# Patient Record
Sex: FEMALE | Race: BLACK OR AFRICAN AMERICAN | NOT HISPANIC OR LATINO
[De-identification: names, ages, dates, MRNs, and addresses within clinical notes are randomized per-mention and may not be internally consistent; named-entity substitution may affect disease eponyms.]

---

## 2017-02-27 ENCOUNTER — APPOINTMENT (OUTPATIENT)
Dept: INTERNAL MEDICINE | Facility: CLINIC | Age: 52
End: 2017-02-27

## 2017-03-01 ENCOUNTER — APPOINTMENT (OUTPATIENT)
Dept: NUTRITION | Facility: CLINIC | Age: 52
End: 2017-03-01

## 2017-03-23 ENCOUNTER — APPOINTMENT (OUTPATIENT)
Dept: INTERNAL MEDICINE | Facility: CLINIC | Age: 52
End: 2017-03-23

## 2017-03-23 VITALS
BODY MASS INDEX: 40.82 KG/M2 | HEART RATE: 97 BPM | WEIGHT: 254 LBS | DIASTOLIC BLOOD PRESSURE: 80 MMHG | HEIGHT: 66 IN | SYSTOLIC BLOOD PRESSURE: 116 MMHG

## 2017-03-23 DIAGNOSIS — M25.531 PAIN IN RIGHT WRIST: ICD-10-CM

## 2017-03-23 DIAGNOSIS — S52.282S: ICD-10-CM

## 2017-03-23 DIAGNOSIS — L03.119 CELLULITIS OF UNSPECIFIED PART OF LIMB: ICD-10-CM

## 2017-03-23 DIAGNOSIS — S52.209A UNSPECIFIED FRACTURE OF SHAFT OF UNSPECIFIED ULNA, INITIAL ENCOUNTER FOR CLOSED FRACTURE: ICD-10-CM

## 2017-03-27 LAB
HEMOCCULT STL QL IA: NEGATIVE
TSH SERPL DL<=0.005 MIU/L-ACNC: 1.11 UIU/ML

## 2017-03-27 RX ORDER — CLOBETASOL PROPIONATE 0.5 MG/G
0.05 CREAM TOPICAL TWICE DAILY
Qty: 1 | Refills: 1 | Status: DISCONTINUED | COMMUNITY
Start: 2017-03-23 | End: 2017-03-27

## 2017-05-08 ENCOUNTER — APPOINTMENT (OUTPATIENT)
Dept: INTERNAL MEDICINE | Facility: CLINIC | Age: 52
End: 2017-05-08

## 2017-05-08 VITALS
WEIGHT: 256 LBS | DIASTOLIC BLOOD PRESSURE: 82 MMHG | BODY MASS INDEX: 41.14 KG/M2 | HEART RATE: 98 BPM | SYSTOLIC BLOOD PRESSURE: 136 MMHG | HEIGHT: 66 IN

## 2017-05-08 DIAGNOSIS — Z86.39 PERSONAL HISTORY OF OTHER ENDOCRINE, NUTRITIONAL AND METABOLIC DISEASE: ICD-10-CM

## 2017-05-09 LAB
ALBUMIN SERPL-MCNC: 3.6 G/DL
ALBUMIN/GLOB SERPL: 1.16
ALP SERPL-CCNC: 91 IU/L
ALT SERPL-CCNC: 19 IU/L
ANION GAP SERPL CALC-SCNC: 11 MEQ/L
AST SERPL-CCNC: 21 IU/L
BASOPHILS # BLD: 0.02 TH/MM3
BASOPHILS NFR BLD: 0.3 %
BILIRUB SERPL-MCNC: 0.7 MG/DL
BUN SERPL-MCNC: 20 MG/DL
BUN/CREAT SERPL: 24.1 %
CALCIUM SERPL-MCNC: 9.7 MG/DL
CHLORIDE SERPL-SCNC: 105 MEQ/L
CO2 SERPL-SCNC: 27 MEQ/L
CREAT SERPL-MCNC: 0.83 MG/DL
DIFFERENTIAL METHOD BLD: NORMAL
EOSINOPHIL # BLD: 0.14 TH/MM3
EOSINOPHIL NFR BLD: 1.8 %
ERYTHROCYTE [DISTWIDTH] IN BLOOD BY AUTOMATED COUNT: 14.7 %
GFR SERPL CREATININE-BSD FRML MDRD: 87
GLUCOSE SERPL-MCNC: 74 MG/DL
GRANULOCYTES # BLD: 3.88 TH/MM3
GRANULOCYTES NFR BLD: 49.3 %
HCT VFR BLD AUTO: 43.1 %
HGB BLD-MCNC: 13.7 G/DL
IMM GRANULOCYTES # BLD: 0.02 TH/MM3
IMM GRANULOCYTES NFR BLD: 0.3 %
LYMPHOCYTES # BLD: 3.11 TH/MM3
LYMPHOCYTES NFR BLD: 39.6 %
MCH RBC QN AUTO: 30.1 PG
MCHC RBC AUTO-ENTMCNC: 31.8 G/DL
MCV RBC AUTO: 94.7 FL
MONOCYTES # BLD: 0.68 TH/MM3
MONOCYTES NFR BLD: 8.7 %
PLATELET # BLD: 262 TH/MM3
PMV BLD AUTO: 9.9 FL
POTASSIUM SERPL-SCNC: 4.2 MMOL/L
PROT SERPL-MCNC: 6.7 G/DL
RBC # BLD AUTO: 4.55 MIL/MM3
SODIUM SERPL-SCNC: 143 MEQ/L
WBC # BLD: 7.85 TH/MM3

## 2017-05-10 LAB
GAMMA INTERFERON BACKGROUND BLD IA-ACNC: 0.05 IU/ML
M TB IFN-G BLD-IMP: NEGATIVE
M TB IFN-G CD4+ T-CELLS BLD-ACNC: -0.01 IU/ML
MITOGEN IGNF BLD-ACNC: >10 IU/ML

## 2017-05-18 ENCOUNTER — APPOINTMENT (OUTPATIENT)
Dept: INTERNAL MEDICINE | Facility: CLINIC | Age: 52
End: 2017-05-18

## 2017-05-18 ENCOUNTER — OUTPATIENT (OUTPATIENT)
Dept: OUTPATIENT SERVICES | Facility: HOSPITAL | Age: 52
LOS: 1 days | Discharge: HOME | End: 2017-05-18

## 2017-05-18 VITALS
SYSTOLIC BLOOD PRESSURE: 122 MMHG | HEART RATE: 93 BPM | BODY MASS INDEX: 40.82 KG/M2 | HEIGHT: 66 IN | DIASTOLIC BLOOD PRESSURE: 85 MMHG | WEIGHT: 254 LBS

## 2017-06-28 DIAGNOSIS — M54.5 LOW BACK PAIN: ICD-10-CM

## 2017-06-28 DIAGNOSIS — F20.9 SCHIZOPHRENIA, UNSPECIFIED: ICD-10-CM

## 2017-06-28 DIAGNOSIS — E66.9 OBESITY, UNSPECIFIED: ICD-10-CM

## 2017-06-28 DIAGNOSIS — F31.9 BIPOLAR DISORDER, UNSPECIFIED: ICD-10-CM

## 2017-06-29 ENCOUNTER — APPOINTMENT (OUTPATIENT)
Dept: INTERNAL MEDICINE | Facility: CLINIC | Age: 52
End: 2017-06-29

## 2017-07-25 ENCOUNTER — APPOINTMENT (OUTPATIENT)
Dept: INTERNAL MEDICINE | Facility: CLINIC | Age: 52
End: 2017-07-25

## 2017-07-25 ENCOUNTER — OUTPATIENT (OUTPATIENT)
Dept: OUTPATIENT SERVICES | Facility: HOSPITAL | Age: 52
LOS: 1 days | Discharge: HOME | End: 2017-07-25

## 2017-07-25 VITALS
HEART RATE: 81 BPM | BODY MASS INDEX: 41.62 KG/M2 | WEIGHT: 259 LBS | SYSTOLIC BLOOD PRESSURE: 130 MMHG | DIASTOLIC BLOOD PRESSURE: 81 MMHG | HEIGHT: 66 IN

## 2017-07-25 DIAGNOSIS — Z79.899 OTHER LONG TERM (CURRENT) DRUG THERAPY: ICD-10-CM

## 2017-08-03 DIAGNOSIS — F31.9 BIPOLAR DISORDER, UNSPECIFIED: ICD-10-CM

## 2017-08-03 DIAGNOSIS — E66.01 MORBID (SEVERE) OBESITY DUE TO EXCESS CALORIES: ICD-10-CM

## 2017-08-29 ENCOUNTER — OUTPATIENT (OUTPATIENT)
Dept: OUTPATIENT SERVICES | Facility: HOSPITAL | Age: 52
LOS: 1 days | Discharge: HOME | End: 2017-08-29

## 2017-08-29 ENCOUNTER — APPOINTMENT (OUTPATIENT)
Dept: INTERNAL MEDICINE | Facility: CLINIC | Age: 52
End: 2017-08-29

## 2017-08-29 VITALS
BODY MASS INDEX: 40.5 KG/M2 | HEART RATE: 65 BPM | SYSTOLIC BLOOD PRESSURE: 122 MMHG | DIASTOLIC BLOOD PRESSURE: 86 MMHG | WEIGHT: 252 LBS | HEIGHT: 66 IN

## 2017-08-29 DIAGNOSIS — Z79.899 OTHER LONG TERM (CURRENT) DRUG THERAPY: ICD-10-CM

## 2017-08-29 LAB — CYTOLOGY CVX/VAG DOC THIN PREP: NEGATIVE

## 2017-08-30 DIAGNOSIS — E66.9 OBESITY, UNSPECIFIED: ICD-10-CM

## 2017-08-30 DIAGNOSIS — R56.9 UNSPECIFIED CONVULSIONS: ICD-10-CM

## 2017-08-30 DIAGNOSIS — L30.9 DERMATITIS, UNSPECIFIED: ICD-10-CM

## 2017-08-30 LAB
CHOLEST SERPL-MCNC: 224 MG/DL
ESTIMATED AVERGAGE GLUCOSE (NORTH): 123 MG/DL
HBA1C MFR BLD: 5.9 %
HDLC SERPL-MCNC: 65 MG/DL
HDLC SERPL: 3.45
LDLC SERPL DIRECT ASSAY-MCNC: 146 MG/DL
TRIGL SERPL-MCNC: 101 MG/DL
VALPROATE SERPL-MCNC: 45 UG/ML
VLDLC SERPL-MCNC: 20 MG/DL

## 2017-09-11 ENCOUNTER — OUTPATIENT (OUTPATIENT)
Dept: OUTPATIENT SERVICES | Facility: HOSPITAL | Age: 52
LOS: 1 days | Discharge: HOME | End: 2017-09-11

## 2017-09-11 DIAGNOSIS — Z12.31 ENCOUNTER FOR SCREENING MAMMOGRAM FOR MALIGNANT NEOPLASM OF BREAST: ICD-10-CM

## 2017-09-13 ENCOUNTER — APPOINTMENT (OUTPATIENT)
Dept: NUTRITION | Facility: CLINIC | Age: 52
End: 2017-09-13

## 2017-09-13 VITALS — BODY MASS INDEX: 41.48 KG/M2 | WEIGHT: 257 LBS

## 2017-09-27 ENCOUNTER — APPOINTMENT (OUTPATIENT)
Dept: OBGYN | Facility: CLINIC | Age: 52
End: 2017-09-27

## 2017-10-27 ENCOUNTER — APPOINTMENT (OUTPATIENT)
Dept: INTERNAL MEDICINE | Facility: CLINIC | Age: 52
End: 2017-10-27

## 2017-10-27 ENCOUNTER — OUTPATIENT (OUTPATIENT)
Dept: OUTPATIENT SERVICES | Facility: HOSPITAL | Age: 52
LOS: 1 days | Discharge: HOME | End: 2017-10-27

## 2017-10-27 VITALS
HEART RATE: 72 BPM | WEIGHT: 260 LBS | DIASTOLIC BLOOD PRESSURE: 90 MMHG | BODY MASS INDEX: 41.78 KG/M2 | HEIGHT: 66 IN | SYSTOLIC BLOOD PRESSURE: 145 MMHG

## 2017-10-27 RX ORDER — HALOBETASOL PROPIONATE 0.5 MG/G
0.05 CREAM TOPICAL TWICE DAILY
Qty: 1 | Refills: 2 | Status: DISCONTINUED | COMMUNITY
Start: 2017-03-27 | End: 2017-10-27

## 2017-11-24 ENCOUNTER — OUTPATIENT (OUTPATIENT)
Dept: OUTPATIENT SERVICES | Facility: HOSPITAL | Age: 52
LOS: 1 days | Discharge: HOME | End: 2017-11-24

## 2017-11-24 ENCOUNTER — APPOINTMENT (OUTPATIENT)
Dept: GASTROENTEROLOGY | Facility: CLINIC | Age: 52
End: 2017-11-24

## 2017-11-24 VITALS — BODY MASS INDEX: 41 KG/M2 | SYSTOLIC BLOOD PRESSURE: 132 MMHG | DIASTOLIC BLOOD PRESSURE: 78 MMHG | WEIGHT: 254 LBS

## 2017-11-29 ENCOUNTER — APPOINTMENT (OUTPATIENT)
Dept: OBGYN | Facility: CLINIC | Age: 52
End: 2017-11-29

## 2018-01-23 ENCOUNTER — APPOINTMENT (OUTPATIENT)
Dept: NEUROLOGY | Facility: CLINIC | Age: 53
End: 2018-01-23

## 2018-01-23 ENCOUNTER — OUTPATIENT (OUTPATIENT)
Dept: OUTPATIENT SERVICES | Facility: HOSPITAL | Age: 53
LOS: 1 days | Discharge: HOME | End: 2018-01-23

## 2018-01-23 VITALS
HEART RATE: 72 BPM | BODY MASS INDEX: 41.78 KG/M2 | WEIGHT: 260 LBS | DIASTOLIC BLOOD PRESSURE: 96 MMHG | HEIGHT: 66 IN | SYSTOLIC BLOOD PRESSURE: 154 MMHG

## 2018-02-09 ENCOUNTER — APPOINTMENT (OUTPATIENT)
Dept: INTERNAL MEDICINE | Facility: CLINIC | Age: 53
End: 2018-02-09

## 2018-03-12 ENCOUNTER — OUTPATIENT (OUTPATIENT)
Dept: OUTPATIENT SERVICES | Facility: HOSPITAL | Age: 53
LOS: 1 days | Discharge: HOME | End: 2018-03-12

## 2018-03-12 ENCOUNTER — APPOINTMENT (OUTPATIENT)
Dept: INTERNAL MEDICINE | Facility: CLINIC | Age: 53
End: 2018-03-12

## 2018-03-12 VITALS
HEART RATE: 74 BPM | SYSTOLIC BLOOD PRESSURE: 151 MMHG | BODY MASS INDEX: 41.46 KG/M2 | WEIGHT: 258 LBS | HEIGHT: 66 IN | DIASTOLIC BLOOD PRESSURE: 89 MMHG

## 2018-03-12 DIAGNOSIS — Z78.9 OTHER SPECIFIED HEALTH STATUS: ICD-10-CM

## 2018-03-12 DIAGNOSIS — F17.200 NICOTINE DEPENDENCE, UNSPECIFIED, UNCOMPLICATED: ICD-10-CM

## 2018-03-13 DIAGNOSIS — R55 SYNCOPE AND COLLAPSE: ICD-10-CM

## 2018-03-13 DIAGNOSIS — R56.9 UNSPECIFIED CONVULSIONS: ICD-10-CM

## 2018-03-13 DIAGNOSIS — F20.9 SCHIZOPHRENIA, UNSPECIFIED: ICD-10-CM

## 2018-03-13 DIAGNOSIS — E78.00 PURE HYPERCHOLESTEROLEMIA, UNSPECIFIED: ICD-10-CM

## 2018-05-01 ENCOUNTER — APPOINTMENT (OUTPATIENT)
Dept: OBGYN | Facility: CLINIC | Age: 53
End: 2018-05-01

## 2018-05-03 ENCOUNTER — INPATIENT (INPATIENT)
Facility: HOSPITAL | Age: 53
LOS: 4 days | Discharge: HOME | End: 2018-05-08
Attending: INTERNAL MEDICINE | Admitting: INTERNAL MEDICINE

## 2018-05-03 VITALS
TEMPERATURE: 97 F | DIASTOLIC BLOOD PRESSURE: 80 MMHG | RESPIRATION RATE: 20 BRPM | OXYGEN SATURATION: 100 % | SYSTOLIC BLOOD PRESSURE: 120 MMHG | HEART RATE: 90 BPM

## 2018-05-03 DIAGNOSIS — F31.9 BIPOLAR DISORDER, UNSPECIFIED: ICD-10-CM

## 2018-05-03 DIAGNOSIS — R56.9 UNSPECIFIED CONVULSIONS: ICD-10-CM

## 2018-05-03 LAB
ALBUMIN SERPL ELPH-MCNC: 4.4 G/DL — SIGNIFICANT CHANGE UP (ref 3.5–5.2)
ALP SERPL-CCNC: 93 U/L — SIGNIFICANT CHANGE UP (ref 30–115)
ALT FLD-CCNC: 14 U/L — SIGNIFICANT CHANGE UP (ref 0–41)
ANION GAP SERPL CALC-SCNC: 16 MMOL/L — HIGH (ref 7–14)
AST SERPL-CCNC: 16 U/L — SIGNIFICANT CHANGE UP (ref 0–41)
BILIRUB SERPL-MCNC: 0.6 MG/DL — SIGNIFICANT CHANGE UP (ref 0.2–1.2)
BUN SERPL-MCNC: 18 MG/DL — SIGNIFICANT CHANGE UP (ref 10–20)
CALCIUM SERPL-MCNC: 9.4 MG/DL — SIGNIFICANT CHANGE UP (ref 8.5–10.1)
CHLORIDE SERPL-SCNC: 103 MMOL/L — SIGNIFICANT CHANGE UP (ref 98–110)
CK MB CFR SERPL CALC: 1.2 NG/ML — SIGNIFICANT CHANGE UP (ref 0.6–6.3)
CK SERPL-CCNC: 130 U/L — SIGNIFICANT CHANGE UP (ref 0–225)
CO2 SERPL-SCNC: 25 MMOL/L — SIGNIFICANT CHANGE UP (ref 17–32)
CREAT SERPL-MCNC: 0.8 MG/DL — SIGNIFICANT CHANGE UP (ref 0.7–1.5)
GLUCOSE SERPL-MCNC: 89 MG/DL — SIGNIFICANT CHANGE UP (ref 70–99)
HCT VFR BLD CALC: 42.9 % — SIGNIFICANT CHANGE UP (ref 37–47)
HGB BLD-MCNC: 14 G/DL — SIGNIFICANT CHANGE UP (ref 12–16)
MCHC RBC-ENTMCNC: 30.1 PG — SIGNIFICANT CHANGE UP (ref 27–31)
MCHC RBC-ENTMCNC: 32.6 G/DL — SIGNIFICANT CHANGE UP (ref 32–37)
MCV RBC AUTO: 92.3 FL — SIGNIFICANT CHANGE UP (ref 81–99)
NRBC # BLD: 0 /100 WBCS — SIGNIFICANT CHANGE UP (ref 0–0)
PLATELET # BLD AUTO: 260 K/UL — SIGNIFICANT CHANGE UP (ref 130–400)
POTASSIUM SERPL-MCNC: 4.2 MMOL/L — SIGNIFICANT CHANGE UP (ref 3.5–5)
POTASSIUM SERPL-SCNC: 4.2 MMOL/L — SIGNIFICANT CHANGE UP (ref 3.5–5)
PROT SERPL-MCNC: 7.7 G/DL — SIGNIFICANT CHANGE UP (ref 6–8)
RBC # BLD: 4.65 M/UL — SIGNIFICANT CHANGE UP (ref 4.2–5.4)
RBC # FLD: 13.6 % — SIGNIFICANT CHANGE UP (ref 11.5–14.5)
SODIUM SERPL-SCNC: 144 MMOL/L — SIGNIFICANT CHANGE UP (ref 135–146)
TROPONIN T SERPL-MCNC: <0.01 NG/ML — SIGNIFICANT CHANGE UP
VALPROATE SERPL-MCNC: 14 UG/ML — LOW (ref 50–100)
WBC # BLD: 8.8 K/UL — SIGNIFICANT CHANGE UP (ref 4.8–10.8)
WBC # FLD AUTO: 8.8 K/UL — SIGNIFICANT CHANGE UP (ref 4.8–10.8)

## 2018-05-03 RX ORDER — ENOXAPARIN SODIUM 100 MG/ML
40 INJECTION SUBCUTANEOUS EVERY 24 HOURS
Qty: 0 | Refills: 0 | Status: DISCONTINUED | OUTPATIENT
Start: 2018-05-03 | End: 2018-05-08

## 2018-05-03 RX ORDER — DIVALPROEX SODIUM 500 MG/1
500 TABLET, DELAYED RELEASE ORAL
Qty: 0 | Refills: 0 | Status: DISCONTINUED | OUTPATIENT
Start: 2018-05-03 | End: 2018-05-08

## 2018-05-03 NOTE — H&P ADULT - ASSESSMENT
Patient is a 46 y/o female with PMHx of Bipolar DO on depakote,  seizures presents s/p witnessed generalized tonic clonic seizure that lasted for about 1 min, with positive head trauma when she fell back. Electrolytes,CBC and Vitals WNL on admission. CT head was negative, EKG NSR. Patient found to have low valproic acid level of 14. No focal deficits.

## 2018-05-03 NOTE — H&P ADULT - ATTENDING COMMENTS
I fully agree withe resident's history/exam/assessment after my independent history/exam/assessment.  I reviewed the neurology attending consult and will wait for results of the MRI and [place].

## 2018-05-03 NOTE — PROGRESS NOTE ADULT - SUBJECTIVE AND OBJECTIVE BOX
Neurology Consult    Patient is a 53y old  Female who presents with a chief complaint of seizure.      HPI:  Pt reports long histoyr of bipolar disorder treated with depakote 500 mg bid. She reports seizure GTC  beginning 3-4 years ago but never took any medication for it.  She describes the spells as stopping and shaking for 1 minute no foaming at the mouth, tongue biting  or loss of bladder control.  States she gets them about twice a year and experiences a "weird feeling"  which comes over her before she starts shaking.    Does report hitting her head but no evident soft tissue swelling.  Non contrast head CT unremarkable.  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    PAST MEDICAL & SURGICAL HISTORY:  Seizures      FAMILY HISTORY:  negative for seizures.  positive for diabetes    Social History: (-) x 3    Allergies    No Known Drug Allergies  Raspberries (Unknown)    Intolerances        MEDICATIONS  (STANDING):  enoxaparin Injectable 40 milliGRAM(s) SubCutaneous every 24 hours    MEDICATIONS  (PRN):      Review of systems:    Constitutional: No fever, weight loss or fatigue    Eyes: No eye pain or discharge  ENMT:  No difficulty hearing; No sinus or throat pain  Neck: No pain or stiffness  Respiratory: No cough, wheezing, chills or hemoptysis  Cardiovascular: No chest pain, palpitations, shortness of breath, dyspnea on exertion  Gastrointestinal: No abdominal pain, nausea, vomiting or hematemesis; No diarrhea or constipation.   Genitourinary: No dysuria, frequency, hematuria or incontinence  Neurological: As per HPI  Skin: No rashes or lesions   Endocrine: No heat or cold intolerance; No hair loss.  states she is menopausal.  Musculoskeletal: No joint pain or swelling, some neck stiffness.  Psychiatric:  mood swings treated by psychiatrist  Heme/Lymph: easy bruising but no bleeding gums    Vital Signs Last 24 Hrs  T(C): 37.1 (03 May 2018 16:00), Max: 37.1 (03 May 2018 16:00)  T(F): 98.8 (03 May 2018 16:00), Max: 98.8 (03 May 2018 16:00)  HR: 77 (03 May 2018 16:00) (77 - 90)  BP: 149/78 (03 May 2018 16:00) (120/80 - 149/78)  BP(mean): --  RR: 18 (03 May 2018 16:00) (18 - 20)  SpO2: 97% (03 May 2018 16:00) (97% - 100%)    Neurologic Examination:  General:  Appearance is consistent with chronologic age.  No abnormal facies.   General: The patient is oriented to person, place, time and date.  Recent and remote memory intact.  Fund of knowledge is intact and normal.  Language with normal repetition, comprehension and naming.  Nondysarthric.    Cranial nerves: intact VA, VFF.  EOMI w/o nystagmus, skew or reported double vision.  PERRL.  No ptosis/weakness of eyelid closure.  Facial sensation is normal with normal bite.  No facial asymmetry.  Hearing grossly intact b/l.  Palate elevates midline.  Tongue midline.  Motor examination:   Normal tone, bulk and range of motion.  No tenderness, twitching, tremors or involuntary movements.  Formal Muscle Strength Testing: (MRC grade R/L) 5/5 UE; 5/5 LE.  No observable drift.  Reflexes:   2+ b/l  biceps, triceps, brachioradialis, patella and Achilles.  Plantar response downgoing b/l.  Jaw jerk, Margaret, clonus absent.  Sensory examination:   Intact to light touch and pinprick, pain, temperature and proprioception and vibration in all extremities.  Cerebellum:   FTN/HKS intact with normal LOC in all limbs.  No dysmetria or dysdiadokinesia.  Gait narrow based and normal.    Labs:   CBC Full  -  ( 03 May 2018 15:47 )  WBC Count : 8.80 K/uL  Hemoglobin : 14.0 g/dL  Hematocrit : 42.9 %  Platelet Count - Automated : 260 K/uL  Mean Cell Volume : 92.3 fL  Mean Cell Hemoglobin : 30.1 pg  Mean Cell Hemoglobin Concentration : 32.6 g/dL  Auto Neutrophil # : x  Auto Lymphocyte # : x  Auto Monocyte # : x  Auto Eosinophil # : x  Auto Basophil # : x  Auto Neutrophil % : x  Auto Lymphocyte % : x  Auto Monocyte % : x  Auto Eosinophil % : x  Auto Basophil % : x    05-03    144  |  103  |  18  ----------------------------<  89  4.2   |  25  |  0.8    Ca    9.4      03 May 2018 15:46    TPro  7.7  /  Alb  4.4  /  TBili  0.6  /  DBili  x   /  AST  16  /  ALT  14  /  AlkPhos  93  05-03    LIVER FUNCTIONS - ( 03 May 2018 15:46 )  Alb: 4.4 g/dL / Pro: 7.7 g/dL / ALK PHOS: 93 U/L / ALT: 14 U/L / AST: 16 U/L / GGT: x             Neuroimaging:  NCHCT: CT Head No Cont:   EXAM:  CT BRAIN            PROCEDURE DATE:  05/03/2018      INTERPRETATION:  Clinical History / Reason for exam: Status post head   trauma, rule out bleed    Technique: Multiple contiguous axial CT images of the head were obtained   from the base of the skull to the vertex without administration of   intravenous contrast.    Comparison: Noncontrast CT head dated 11/28/2015.      Findings: The ventricles, basal cisterns and sulcal pattern are unchanged   compared to previous study and within normal limits for patient's stated   age. There is again incidentally noted a partially empty sella. There are   no cerebral, cerebellar or mid brain parenchymal abnormalities.  There is   no acute mass effect, midline shift or hemorrhage.  No extra-axial fluid   collections are identified.    There are no acute fractures or dislocations. The bones of the calvarium   are intact. No significant soft tissue swelling is noted currently. The   paranasal sinuses, bilateral mastoid complexes, globes and orbits are   grossly within normal limits.    Beam hardening artifact is noted overlying the brain stem and posterior   fossa which is inherent to CT in this location.      IMPRESSION:     No acute fractures, mass effect, midline shift or hemorrhage.      BELEM VAUGHAN M.D., ATTENDING RADIOLOGIST  This document has been electronically signed. May  3 2018  3:13PM            Assessment:  This is a 53y Female with h/o bipolar disorder and apparently partial onset, secondary generalized seizures occuring  twice a year.   She is already on depakote and is menopausal.  She denies side effects from the medication and denies missing  doses.  It may make the most sense to increase this to achieve a target  range. She apparently is enrolled in a medication  monitoring service to help insure she takes her medication.    Plan:   1.  MRI brain with and without contrast.  2.  Epilepsy monitoring unit at [place] after above.    - 05-03-18 @ 22:05 Neurology Consult    Patient is a 53y old  Female who presents with a chief complaint of seizure.      HPI:  Pt reports long histoyr of bipolar disorder treated with depakote 500 mg bid. She reports seizure GTC  beginning 3-4 years ago but never took any medication for it.  She describes the spells as stopping and shaking for 1 minute no foaming at the mouth, tongue biting  or loss of bladder control.  States she gets them about twice a year and experiences a "weird feeling"  which comes over her before she starts shaking.    Does report hitting her head but no evident soft tissue swelling.  Non contrast head CT unremarkable.  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    PAST MEDICAL & SURGICAL HISTORY:  Seizures      FAMILY HISTORY:  negative for seizures.  positive for diabetes    Social History: (-) x 3    Allergies    No Known Drug Allergies  Raspberries (Unknown)    Intolerances        MEDICATIONS  (STANDING):  enoxaparin Injectable 40 milliGRAM(s) SubCutaneous every 24 hours    MEDICATIONS  (PRN):      Review of systems:    Constitutional: No fever, weight loss or fatigue    Eyes: No eye pain or discharge  ENMT:  No difficulty hearing; No sinus or throat pain  Neck: No pain or stiffness  Respiratory: No cough, wheezing, chills or hemoptysis  Cardiovascular: No chest pain, palpitations, shortness of breath, dyspnea on exertion  Gastrointestinal: No abdominal pain, nausea, vomiting or hematemesis; No diarrhea or constipation.   Genitourinary: No dysuria, frequency, hematuria or incontinence  Neurological: As per HPI  Skin: No rashes or lesions   Endocrine: No heat or cold intolerance; No hair loss.  states she is menopausal.  Musculoskeletal: No joint pain or swelling, some neck stiffness.  Psychiatric:  mood swings treated by psychiatrist  Heme/Lymph: easy bruising but no bleeding gums    Vital Signs Last 24 Hrs  T(C): 37.1 (03 May 2018 16:00), Max: 37.1 (03 May 2018 16:00)  T(F): 98.8 (03 May 2018 16:00), Max: 98.8 (03 May 2018 16:00)  HR: 77 (03 May 2018 16:00) (77 - 90)  BP: 149/78 (03 May 2018 16:00) (120/80 - 149/78)  BP(mean): --  RR: 18 (03 May 2018 16:00) (18 - 20)  SpO2: 97% (03 May 2018 16:00) (97% - 100%)    Neurologic Examination:  General:  Appearance is consistent with chronologic age.   General: The patient is oriented to person, place, time and date.  Recent and remote memory intact.  Fund of knowledge is intact and normal.  Language with normal repetition, comprehension and naming.  Nondysarthric.    Cranial nerves: intact VA, VFF.  EOMI w/o nystagmus.  PERRL.  No ptosis/weakness of eyelid closure.  Facial sensation is normal with normal bite.  No facial asymmetry.  Hearing grossly intact b/l.  Palate elevates midline.  Tongue midline.  Motor examination:   Normal tone, bulk and range of motion.  No tenderness, twitching, tremors or involuntary movements.  Formal Muscle Strength Testing: (MRC grade R/L) 5/5 UE; 5/5 LE.  No observable drift.  Reflexes:   2+ b/l  biceps, triceps, brachioradialis, patella and Achilles.  Plantar response downgoing b/l.    Sensory examination:   Intact to light touch, temperature and vibration in all extremities.  Cerebellum:   FTN/HKS intact with normal LOC in all limbs.  No dysmetria or dysdiadokinesia.  Gait narrow based and normal.    Lungs CTA bilat  COR: RRR - murmurs  Ext: -c/c/e    Labs:   CBC Full  -  ( 03 May 2018 15:47 )  WBC Count : 8.80 K/uL  Hemoglobin : 14.0 g/dL  Hematocrit : 42.9 %  Platelet Count - Automated : 260 K/uL  Mean Cell Volume : 92.3 fL  Mean Cell Hemoglobin : 30.1 pg  Mean Cell Hemoglobin Concentration : 32.6 g/dL  Auto Neutrophil # : x  Auto Lymphocyte # : x  Auto Monocyte # : x  Auto Eosinophil # : x  Auto Basophil # : x  Auto Neutrophil % : x  Auto Lymphocyte % : x  Auto Monocyte % : x  Auto Eosinophil % : x  Auto Basophil % : x    05-03    144  |  103  |  18  ----------------------------<  89  4.2   |  25  |  0.8    Ca    9.4      03 May 2018 15:46    TPro  7.7  /  Alb  4.4  /  TBili  0.6  /  DBili  x   /  AST  16  /  ALT  14  /  AlkPhos  93  05-03    LIVER FUNCTIONS - ( 03 May 2018 15:46 )  Alb: 4.4 g/dL / Pro: 7.7 g/dL / ALK PHOS: 93 U/L / ALT: 14 U/L / AST: 16 U/L / GGT: x             Neuroimaging:  NCHCT: CT Head No Cont:   EXAM:  CT BRAIN            PROCEDURE DATE:  05/03/2018      INTERPRETATION:  Clinical History / Reason for exam: Status post head   trauma, rule out bleed    Technique: Multiple contiguous axial CT images of the head were obtained   from the base of the skull to the vertex without administration of   intravenous contrast.    Comparison: Noncontrast CT head dated 11/28/2015.      Findings: The ventricles, basal cisterns and sulcal pattern are unchanged   compared to previous study and within normal limits for patient's stated   age. There is again incidentally noted a partially empty sella. There are   no cerebral, cerebellar or mid brain parenchymal abnormalities.  There is   no acute mass effect, midline shift or hemorrhage.  No extra-axial fluid   collections are identified.    There are no acute fractures or dislocations. The bones of the calvarium   are intact. No significant soft tissue swelling is noted currently. The   paranasal sinuses, bilateral mastoid complexes, globes and orbits are   grossly within normal limits.    Beam hardening artifact is noted overlying the brain stem and posterior   fossa which is inherent to CT in this location.      IMPRESSION:     No acute fractures, mass effect, midline shift or hemorrhage.      BELEM VAUGHAN M.D., ATTENDING RADIOLOGIST  This document has been electronically signed. May  3 2018  3:13PM            Assessment:  This is a 53y Female with h/o bipolar disorder and apparently partial onset, secondary generalized seizures occuring  twice a year.   She is already on depakote and is menopausal.  She denies side effects from the medication and denies missing  doses.  It may make the most sense to increase this to achieve a target  range. She apparently is enrolled in a medication  monitoring service to help insure she takes her medication.    Plan:   1.  MRI brain with and without contrast.  2.  Epilepsy monitoring unit at [place] after above.    - 05-03-18 @ 22:05

## 2018-05-03 NOTE — ED PROVIDER NOTE - PROGRESS NOTE DETAILS
EKG read by computer as STEMI. No indication on my interpretation of STEMI, however noted TWI in III, AVF, without comparison. Sending cardiac labs to further eval.

## 2018-05-03 NOTE — H&P ADULT - NSHPLABSRESULTS_GEN_ALL_CORE
14.0   8.80  )-----------( 260      ( 03 May 2018 15:47 )             42.9       05-03    144  |  103  |  18  ----------------------------<  89  4.2   |  25  |  0.8    Ca    9.4      03 May 2018 15:46    TPro  7.7  /  Alb  4.4  /  TBili  0.6  /  DBili  x   /  AST  16  /  ALT  14  /  AlkPhos  93  05-03                      Lactate Trend      CARDIAC MARKERS ( 03 May 2018 17:25 )  x     / <0.01 ng/mL / x     / x     / 1.2 ng/mL  CARDIAC MARKERS ( 03 May 2018 17:19 )  x     / x     / 130 U/L / x     / x            CAPILLARY BLOOD GLUCOSE        < from: CT Head No Cont (05.03.18 @ 14:49) >    IMPRESSION:     No acute fractures, mass effect, midline shift or hemorrhage.    < end of copied text >      < from: Xray Chest 1 View- PORTABLE-Urgent (05.03.18 @ 16:36) >    Impression:      No radiographic evidence of acute cardiopulmonary disease.    < end of copied text >    < from: 12 Lead ECG (05.03.18 @ 16:21) >    Diagnosis Line Normal sinus rhythm  Voltage criteria for left ventricular hypertrophy  ST elevation, consider early repolarization, pericarditis, or injury  Abnormal ECG    Confirmed by Kee Worthington ([id number]) on 5/3/2018 6:51:03 PM    < end of copied text >

## 2018-05-03 NOTE — H&P ADULT - NSHPPHYSICALEXAM_GEN_ALL_CORE
PHYSICAL EXAM:  GENERAL: NAD, speaks in full sentences, no signs of respiratory distress  HEAD:  Atraumatic, Normocephalic  EYES: EOMI, PERRLA, conjunctiva and sclera clear  NECK: Supple, No JVD  CHEST/LUNG: Clear to auscultation bilaterally; No wheeze; No crackles; No accessory muscles used  HEART: Regular rate and rhythm; No murmurs;   ABDOMEN: Soft, Nontender, Nondistended; Bowel sounds present; No guarding  EXTREMITIES:  2+ Peripheral Pulses, No cyanosis or edema  PSYCH: AAOx3  NEUROLOGY: non-focal  SKIN: No rashes or lesions PHYSICAL EXAM:  GENERAL: NAD, speaks in full sentences, no signs of respiratory distress  HEAD:  Atraumatic, Normocephalic  OPHTHO: EOMI, PERRLA, conjunctiva and sclera clear  NECK: Supple, No JVD  PULM: Clear to auscultation bilaterally; No wheeze; No crackles; No accessory muscles used  CV: Regular rate and rhythm; No murmurs;   GI: Soft, Nontender, Nondistended; Bowel sounds present; No guarding  EXTREMITIES:  2+ Peripheral Pulses, No cyanosis or edema  PSYCH: AAOx3  NEUROLOGY: non-focal  SKIN: No rashes or lesions  MUSC/SKEL: no clubbing

## 2018-05-03 NOTE — ED PROVIDER NOTE - ATTENDING CONTRIBUTION TO CARE
45 y F PMH bipolar on depakote, infrequent episodes of LOC and shaking, seizure-like, x 2-3 years, never with EEG or started on antiepileptics, presenting after episode of LOC, total body shaking, fell to hit the back of her head without preceding symptoms of palpitations, chest pain, SOB, dizziness, or other symptom. Now not complaining of headache, states she is feeling entirely well. No nausea, vomiting, vison changes. Patient in normal state of laura prior to episode. Exam shows neurologic exam nonfocal, NCAT, neck supple nontender, no meningismus. lungs, bcta, heart RRR no murmur. abd soft ntnd, ext nl ROM, nontender, no edema. No skin changes or rashes.   A/P: Eval for abnormal depakote level, arrhythmia as cause for episodes. Consult neurology for safe workup dispo inpt vs outpt.

## 2018-05-03 NOTE — ED ADULT NURSE NOTE - OBJECTIVE STATEMENT
Pt [place]. Pt friend states pt was "moving left to right". Pt had a LOC, denies pain n/v/d at this time.

## 2018-05-03 NOTE — ED PROVIDER NOTE - PHYSICAL EXAMINATION
Gen: AA female, appears stated age, NAD   HEENT: PERRLA, NC/AT, neck with mild tenderness on palpation   Cardio: RRR, no murmurs   Resp: CTA B/L   Abdomen: obese, nt/nd   Neuro: AOx3, EOMI, CN II-XII intact, muscle strength and sensation intact in all extremities. No pronator drift.

## 2018-05-03 NOTE — H&P ADULT - PROBLEM SELECTOR PLAN 1
-Admit to medicine  -EEG ordered   -Keep Mg >2  -Check TSH  -Neurology aware of patient and following  -DVT ppx

## 2018-05-03 NOTE — H&P ADULT - HISTORY OF PRESENT ILLNESS
Patient is a 44 y/o female with PMHx of Bipolar DO on depakote,  seizures presents s/p witnessed generalized tonic clonic seizure that lasted for about 1 min, with positive head trauma when she fell back.Patient states she was at [facility] on line with her cousin when she starting seizing. She does not remember events immediately prior to the seziure. Her cousin who was the witness denies   foaming at the mouth,  tongue biting,  urinary or bowel incontinence. Patient had a post-ictal state for a few minutes after seizing. Patient reports she has never seen a neurologist or had an EEG done but her first seizure may have been 3 years ago and her last prior seizure was  2-3 months back with similar presentation for which she did not seek medical attention.  She takes depakote for bipolar DO. Currently denies Headache,vision changes, confusion, shortness of breath, palpitations, chest pain, n/v/f/c, abdominal pain, weight loss, night sweats.     In ED:  ICU Vital Signs Last 24 Hrs  T(C): 37.1 (03 May 2018 16:00), Max: 37.1 (03 May 2018 16:00)  T(F): 98.8 (03 May 2018 16:00), Max: 98.8 (03 May 2018 16:00)  HR: 77 (03 May 2018 16:00) (77 - 90)  BP: 149/78 (03 May 2018 16:00) (120/80 - 149/78)  BP(mean): --  ABP: --  ABP(mean): --  RR: 18 (03 May 2018 16:00) (18 - 20)  SpO2: 97% (03 May 2018 16:00) (97% - 100%)

## 2018-05-03 NOTE — ED PROVIDER NOTE - OBJECTIVE STATEMENT
44 y/o female with PMHx of Bipolar DO and seizures presents s/p witnessed general tonic clonic seizure that lasted for about 1 min, with positive head trauma when she fell back. No foaming at the mouth, no tongue biting, no urinary or bowel incontinence. Patient had a post-ictal state for a few minutes after seizing. Patient reports she has never seen a neurologist or had an EEG done but her first seizure may have been 3-4 years ago. Patient's cousin at bedside reports that it's difficult to tell how many seizures she's had but her last definite one was 6 months ago. Patient is on depakote for bipolar DO.     Patient denies headache, dizziness, CP, SOB, abd pain, no MSK symptoms.     PMD: Dr. Armijo   Psych care: ACT team 44 y/o female with PMHx of Bipolar DO and seizures presents s/p witnessed generalized tonic clonic seizure that lasted for about 1 min, with positive head trauma when she fell back. No foaming at the mouth, no tongue biting, no urinary or bowel incontinence. Patient had a post-ictal state for a few minutes after seizing. Patient reports she has never seen a neurologist or had an EEG done but her first seizure may have been 3-4 years ago. Patient's cousin at bedside reports that it's difficult to tell how many seizures she's had but her last definite one was 6 months ago. Patient is on Depakote for bipolar DO.     Patient denies headache, dizziness, CP, SOB, abd pain, no MSK symptoms.     PMD: Dr. Armijo   Psych care: ACT team

## 2018-05-03 NOTE — H&P ADULT - NSHPSOCIALHISTORY_GEN_ALL_CORE
Marital Status:  (   )    ( x  ) Single    (   )    (  )   Occupation:   Lives with: ( x ) alone  (  ) children   (  ) spouse   (  ) parents  (  ) other  Recent Travel: no    Substance Use (street drugs): (  x) never used  (  ) other:  Tobacco Usage:  ( x  ) never smoked   (   ) former smoker   (   ) current smoker  (     ) pack year  Alcohol Usage:no   Sexual History: neg

## 2018-05-04 DIAGNOSIS — I51.7 CARDIOMEGALY: ICD-10-CM

## 2018-05-04 DIAGNOSIS — D72.829 ELEVATED WHITE BLOOD CELL COUNT, UNSPECIFIED: ICD-10-CM

## 2018-05-04 LAB
ANION GAP SERPL CALC-SCNC: 15 MMOL/L — HIGH (ref 7–14)
BASOPHILS # BLD AUTO: 0.03 K/UL — SIGNIFICANT CHANGE UP (ref 0–0.2)
BASOPHILS NFR BLD AUTO: 0.4 % — SIGNIFICANT CHANGE UP (ref 0–1)
BUN SERPL-MCNC: 16 MG/DL — SIGNIFICANT CHANGE UP (ref 10–20)
CALCIUM SERPL-MCNC: 9.2 MG/DL — SIGNIFICANT CHANGE UP (ref 8.5–10.1)
CHLORIDE SERPL-SCNC: 102 MMOL/L — SIGNIFICANT CHANGE UP (ref 98–110)
CO2 SERPL-SCNC: 29 MMOL/L — SIGNIFICANT CHANGE UP (ref 17–32)
CREAT SERPL-MCNC: 0.8 MG/DL — SIGNIFICANT CHANGE UP (ref 0.7–1.5)
EOSINOPHIL # BLD AUTO: 0.2 K/UL — SIGNIFICANT CHANGE UP (ref 0–0.7)
EOSINOPHIL NFR BLD AUTO: 2.7 % — SIGNIFICANT CHANGE UP (ref 0–8)
GLUCOSE SERPL-MCNC: 79 MG/DL — SIGNIFICANT CHANGE UP (ref 70–99)
HCT VFR BLD CALC: 40 % — SIGNIFICANT CHANGE UP (ref 37–47)
HGB BLD-MCNC: 13.1 G/DL — SIGNIFICANT CHANGE UP (ref 12–16)
IMM GRANULOCYTES NFR BLD AUTO: 0.3 % — SIGNIFICANT CHANGE UP (ref 0.1–0.3)
LYMPHOCYTES # BLD AUTO: 3.45 K/UL — HIGH (ref 1.2–3.4)
LYMPHOCYTES # BLD AUTO: 46.3 % — SIGNIFICANT CHANGE UP (ref 20.5–51.1)
MAGNESIUM SERPL-MCNC: 2.2 MG/DL — SIGNIFICANT CHANGE UP (ref 1.8–2.4)
MCHC RBC-ENTMCNC: 30.3 PG — SIGNIFICANT CHANGE UP (ref 27–31)
MCHC RBC-ENTMCNC: 32.8 G/DL — SIGNIFICANT CHANGE UP (ref 32–37)
MCV RBC AUTO: 92.4 FL — SIGNIFICANT CHANGE UP (ref 81–99)
MONOCYTES # BLD AUTO: 0.59 K/UL — SIGNIFICANT CHANGE UP (ref 0.1–0.6)
MONOCYTES NFR BLD AUTO: 7.9 % — SIGNIFICANT CHANGE UP (ref 1.7–9.3)
NEUTROPHILS # BLD AUTO: 3.16 K/UL — SIGNIFICANT CHANGE UP (ref 1.4–6.5)
NEUTROPHILS NFR BLD AUTO: 42.4 % — SIGNIFICANT CHANGE UP (ref 42.2–75.2)
PLATELET # BLD AUTO: 277 K/UL — SIGNIFICANT CHANGE UP (ref 130–400)
POTASSIUM SERPL-MCNC: 4.2 MMOL/L — SIGNIFICANT CHANGE UP (ref 3.5–5)
POTASSIUM SERPL-SCNC: 4.2 MMOL/L — SIGNIFICANT CHANGE UP (ref 3.5–5)
RBC # BLD: 4.33 M/UL — SIGNIFICANT CHANGE UP (ref 4.2–5.4)
RBC # FLD: 13.6 % — SIGNIFICANT CHANGE UP (ref 11.5–14.5)
SODIUM SERPL-SCNC: 146 MMOL/L — SIGNIFICANT CHANGE UP (ref 135–146)
T3 SERPL-MCNC: 110 NG/DL — SIGNIFICANT CHANGE UP (ref 80–200)
T4 AB SER-ACNC: 7.2 UG/DL — SIGNIFICANT CHANGE UP (ref 4.6–12)
TSH SERPL-MCNC: 1.5 UIU/ML — SIGNIFICANT CHANGE UP (ref 0.27–4.2)
WBC # BLD: 7.45 K/UL — SIGNIFICANT CHANGE UP (ref 4.8–10.8)
WBC # FLD AUTO: 7.45 K/UL — SIGNIFICANT CHANGE UP (ref 4.8–10.8)

## 2018-05-04 RX ADMIN — DIVALPROEX SODIUM 500 MILLIGRAM(S): 500 TABLET, DELAYED RELEASE ORAL at 06:52

## 2018-05-04 RX ADMIN — DIVALPROEX SODIUM 500 MILLIGRAM(S): 500 TABLET, DELAYED RELEASE ORAL at 21:57

## 2018-05-04 RX ADMIN — ENOXAPARIN SODIUM 40 MILLIGRAM(S): 100 INJECTION SUBCUTANEOUS at 06:52

## 2018-05-04 NOTE — PROGRESS NOTE ADULT - SUBJECTIVE AND OBJECTIVE BOX
ELBA MAXWELL 53y Female   MRN#: [id number]    Patient is a 53y old Female who presents with a chief complaint of shaking and syncope. Currently admitted to medicine with the primary diagnosis of Seizures.Today is hospital day 1d. This morning she is resting comfortably in bed and reports no new issues or overnight events.     PAST MEDICAL & SURGICAL HISTORY  Bipolar 1 disorder  Seizures  No significant past surgical history      ALLERGIES:  No Known Drug Allergies  Raspberries (Unknown)      MEDICATIONS:  STANDING MEDICATIONS  diVALproex  milliGRAM(s) Oral two times a day  enoxaparin Injectable 40 milliGRAM(s) SubCutaneous every 24 hours    PRN MEDICATIONS      VITALS:   T(F): 97.9  HR: 74  BP: 131/84  RR: 17  SpO2: 99%    LABS:                        13.1   7.45  )-----------( 277      ( 04 May 2018 05:39 )             40.0     05-04    146  |  102  |  16  ----------------------------<  79  4.2   |  29  |  0.8    Ca    9.2      04 May 2018 05:39  Mg     2.2     05-04    TPro  7.7  /  Alb  4.4  /  TBili  0.6  /  DBili  x   /  AST  16  /  ALT  14  /  AlkPhos  93  05-03          Troponin T, Serum: <0.01 ng/mL (05-03-18 @ 17:25)  Creatine Kinase, Serum: 130 U/L (05-03-18 @ 17:19)      CARDIAC MARKERS ( 03 May 2018 17:25 )  x     / <0.01 ng/mL / x     / x     / 1.2 ng/mL  CARDIAC MARKERS ( 03 May 2018 17:19 )  x     / x     / 130 U/L / x     / x          RADIOLOGY:  CT Head No Cont (05.03.18 @ 14:49)  No acute fractures, mass effect, midline shift or hemorrhage.    MRI BRAIN W AND W/O CONTRAST PENDING    PHYSICAL EXAM:    GENERAL: NAD, well-developed, AAOx3, odd affect  HEENT:  Atraumatic, Normocephalic. EOMI, PERRLA, conjunctiva and sclera clear, No JVD  PULMONARY: Clear to auscultation bilaterally; No wheeze  CARDIOVASCULAR: Regular rate and rhythm; No murmurs, rubs, or gallops  GASTROINTESTINAL: Soft, Nontender, Nondistended; Bowel sounds present  MUSCULOSKELETAL:  2+ Peripheral Pulses, No clubbing, cyanosis, or edema  NEUROLOGY: non-focal  SKIN: No rashes or lesions

## 2018-05-04 NOTE — PROGRESS NOTE ADULT - PROBLEM SELECTOR PLAN 1
- Stable at this time, continue current meds  - Pending MRI with and without contrast  - Post MRI[place] and further evaluation  - Neurology following

## 2018-05-04 NOTE — PROGRESS NOTE ADULT - ASSESSMENT
Patient is a 53y old Female who presents with a chief complaint of shaking and syncope. Currently admitted to medicine with the primary diagnosis of Seizures.

## 2018-05-05 DIAGNOSIS — R94.31 ABNORMAL ELECTROCARDIOGRAM [ECG] [EKG]: ICD-10-CM

## 2018-05-05 DIAGNOSIS — Z71.89 OTHER SPECIFIED COUNSELING: ICD-10-CM

## 2018-05-05 RX ORDER — ACETAMINOPHEN 500 MG
650 TABLET ORAL EVERY 6 HOURS
Qty: 0 | Refills: 0 | Status: DISCONTINUED | OUTPATIENT
Start: 2018-05-05 | End: 2018-05-08

## 2018-05-05 RX ADMIN — Medication 650 MILLIGRAM(S): at 14:48

## 2018-05-05 RX ADMIN — Medication 650 MILLIGRAM(S): at 12:29

## 2018-05-05 RX ADMIN — DIVALPROEX SODIUM 500 MILLIGRAM(S): 500 TABLET, DELAYED RELEASE ORAL at 17:10

## 2018-05-05 RX ADMIN — DIVALPROEX SODIUM 500 MILLIGRAM(S): 500 TABLET, DELAYED RELEASE ORAL at 06:48

## 2018-05-05 RX ADMIN — ENOXAPARIN SODIUM 40 MILLIGRAM(S): 100 INJECTION SUBCUTANEOUS at 06:48

## 2018-05-05 NOTE — PROGRESS NOTE ADULT - PROBLEM SELECTOR PLAN 2
early repol vs LVH vs other. considering LOC episodes reasonable to get TTE  - get TTE early repol vs LVH vs other. needs outpatient PCP followup for TTE

## 2018-05-05 NOTE — PROGRESS NOTE ADULT - ASSESSMENT
Hospitalist onservice note.     54 yo female with PMH that includes infrequent episodes of LOC and shaking seizure-like for which she had never seen a neuroogist, bipolar disorder for which she gets depakote, who presented for eval for tonic clonic seizure withnessed by bystanders, lasting 1 minute. She was initially evaluated at the [place] site, where she was noted with abnormal EKG and for which cardiac enzymes were sent. She was seen by neurology and planned for MRI of the head followed by transfer [facility] for EEG monitoring and medication dose titration      PAST MEDICAL & SURGICAL HISTORY  Bipolar 1 disorder  Seizures  No significant past surgical history      ALLERGIES:  No Known Drug Allergies  Raspberries (Unknown) Hospitalist onservice note.     52 yo female with PMH that includes infrequent episodes of LOC and shaking seizure-like for which she had never seen a neuroogist, bipolar disorder for which she gets depakote, who presented for eval for tonic clonic seizure withnessed by bystanders, lasting 1 minute. She was initially evaluated at the [place] site, where she was noted with abnormal EKG and for which cardiac enzymes were sent. She was seen by neurology and planned for MRI of the head followed by transfer [facility] for EEG monitoring and medication dose titration    PMD: Dr. Armijo   Psych care: ACT team      PAST MEDICAL & SURGICAL HISTORY  Bipolar 1 disorder  Seizures  No significant past surgical history      ALLERGIES:  No Known Drug Allergies  Raspberries (Unknown)

## 2018-05-05 NOTE — PROGRESS NOTE ADULT - ASSESSMENT
53 year old woman with history of bipolar disorder and seizures admitted with possible breakthrough seizure    CT head normal

## 2018-05-05 NOTE — PROGRESS NOTE ADULT - SUBJECTIVE AND OBJECTIVE BOX
ELBA MAXWELL 53y Female   MRN#: [id number]    CC: f/u seizures      ROS:        Vital Signs Last 24 Hrs  T(C): 36.6 (05 May 2018 13:53), Max: 37.1 (04 May 2018 16:45)  T(F): 97.8 (05 May 2018 13:53), Max: 98.8 (04 May 2018 16:45)  HR: 79 (05 May 2018 13:53) (77 - 85)  BP: 114/53 (05 May 2018 13:53) (101/58 - 158/83)  BP(mean): --  RR: 16 (05 May 2018 13:53) (16 - 18)  SpO2: 99% (04 May 2018 16:45) (99% - 99%)        PHYSICAL EXAM:    GENERAL: NAD, well-developed, AAOx3, odd affect  HEENT:  Atraumatic, Normocephalic. EOMI, PERRLA, conjunctiva and sclera clear, No JVD  PULMONARY: Clear to auscultation bilaterally; No wheeze  CARDIOVASCULAR: Regular rate and rhythm; No murmurs, rubs, or gallops  GASTROINTESTINAL: Soft, Nontender, Nondistended; Bowel sounds present  MUSCULOSKELETAL:  2+ Peripheral Pulses, No clubbing, cyanosis, or edema  NEUROLOGY: non-focal  SKIN: No rashes or lesions      DATA:    no new labs scheduled for today                          13.1   7.45  )-----------( 277      ( 04 May 2018 05:39 )             40.0     05-04    146  |  102  |  16  ----------------------------<  79  4.2   |  29  |  0.8    Ca    9.2      04 May 2018 05:39  Mg     2.2     05-04    TPro  7.7  /  Alb  4.4  /  TBili  0.6  /  DBili  x   /  AST  16  /  ALT  14  /  AlkPhos  93  05-03    CARDIAC MARKERS ( 03 May 2018 17:25 )  x     / <0.01 ng/mL / x     / x     / 1.2 ng/mL  CARDIAC MARKERS ( 03 May 2018 17:19 )  x     / x     / 130 U/L / x     / x ELBA MAXWELL 53y Female   MRN#: [id number]    CC: f/u seizures      ROS:  no comlaints or [ain  tells me had a mild ha this am now resolved      Vital Signs Last 24 Hrs  T(C): 36.6 (05 May 2018 13:53), Max: 37.1 (04 May 2018 16:45)  T(F): 97.8 (05 May 2018 13:53), Max: 98.8 (04 May 2018 16:45)  HR: 79 (05 May 2018 13:53) (77 - 85)  BP: 114/53 (05 May 2018 13:53) (101/58 - 158/83)  BP(mean): --  RR: 16 (05 May 2018 13:53) (16 - 18)  SpO2: 99% (04 May 2018 16:45) (99% - 99%)        PHYSICAL EXAM:    GENERAL: NAD, well-developed, AAOx3, friendly  PULMONARY: Clear to auscultation bilaterally; No wheeze  CARDIOVASCULAR: Regular rate and rhythm; No murmurs, rubs, or gallops  GASTROINTESTINAL: Soft, Nontender, Nondistended; Bowel sounds present  MUSCULOSKELETAL:  2+ Peripheral Pulses, No clubbing, cyanosis, or edema  NEUROLOGY: moving all ext. gait stable a symmetric  SKIN: dry skin to right shin      DATA:    no new labs scheduled for today                          13.1   7.45  )-----------( 277      ( 04 May 2018 05:39 )             40.0     05-04    146  |  102  |  16  ----------------------------<  79  4.2   |  29  |  0.8    Ca    9.2      04 May 2018 05:39  Mg     2.2     05-04    TPro  7.7  /  Alb  4.4  /  TBili  0.6  /  DBili  x   /  AST  16  /  ALT  14  /  AlkPhos  93  05-03    CARDIAC MARKERS ( 03 May 2018 17:25 )  x     / <0.01 ng/mL / x     / x     / 1.2 ng/mL  CARDIAC MARKERS ( 03 May 2018 17:19 )  x     / x     / 130 U/L / x     / x

## 2018-05-05 NOTE — PROGRESS NOTE ADULT - SUBJECTIVE AND OBJECTIVE BOX
53 year old woman with long history of bipolar disorder treated with Depakote 500 mg bid. She reports seizure GTC  beginning 3-4 years ago but never took any medication for it. Patient admitted with breakthrough seizure  She describes the spells as stopping and shaking for 1 minute no foaming at the mouth, tongue biting  or loss of bladder control.  States she gets them about twice a year and experiences a "weird feeling"  which comes over her before she starts shaking.    No events overnight. Patient eating and sleeping well    Does report hitting her head but no evident soft tissue swelling.  Non contrast head CT unremarkable.  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    Vital Signs Last 24 Hrs  T(C): 35.9 (05 May 2018 04:34), Max: 37.1 (04 May 2018 16:45)  T(F): 96.7 (05 May 2018 04:34), Max: 98.8 (04 May 2018 16:45)  HR: 85 (05 May 2018 04:34) (74 - 85)  BP: 101/58 (05 May 2018 04:34) (101/58 - 158/83)  RR: 16 (05 May 2018 04:34) (16 - 18)  SpO2: 99% (04 May 2018 16:45) (99% - 99%)    MEDICATIONS  (STANDING):  diVALproex  milliGRAM(s) Oral two times a day  enoxaparin Injectable 40 milliGRAM(s) SubCutaneous every 24 hours    MEDICATIONS  (PRN):    Neurologic Examination:  General:  Appearance is consistent with chronologic age.   General: The patient is oriented to person, place, time and date.  Recent and remote memory intact.  Fund of knowledge is intact and normal.  Language with normal repetition, comprehension and naming.  Nondysarthric.    Cranial nerves: intact VA, VFF.  EOMI w/o nystagmus.  PERRL.  No ptosis/weakness of eyelid closure.  Facial sensation is normal with normal bite.  No facial asymmetry.  Hearing grossly intact b/l.  Palate elevates midline.  Tongue midline.  Motor examination:   Normal tone, bulk and range of motion.  No tenderness, twitching, tremors or involuntary movements.  Formal Muscle Strength Testing: (MRC grade R/L) 5/5 UE; 5/5 LE.  No observable drift.  Reflexes:   2+ b/l  biceps, triceps, brachioradialis, patella and Achilles.  Plantar response downgoing b/l.    Sensory examination:   Intact to light touch, temperature and vibration in all extremities.  Cerebellum:   FTN/HKS intact with normal LOC in all limbs.  No dysmetria or dysdiadokinesia.  Gait narrow based and normal.

## 2018-05-05 NOTE — PROGRESS NOTE ADULT - PROBLEM SELECTOR PLAN 1
1. Video EEG monitoring to characterize event and assess epileptiform activity  2. Continue medications as above for now   3. Seizure precautions

## 2018-05-05 NOTE — PROGRESS NOTE ADULT - PROBLEM SELECTOR PLAN 3
.  - VTE ppx with .  - clarify home meds (ie cogentin and dose) .  - clarify home meds. renee denies being on cogentin

## 2018-05-05 NOTE — PROGRESS NOTE ADULT - PROBLEM SELECTOR PLAN 1
episodpes of LOC and shaking, suspected seizures. stable. on depaote and EEG monitiorin. MRI brain ordered then subsequently cancled  - will clarify with neuro, is MRI desired? if so cannot get it now while getting EG monitoring  - cont depakote episodpes of LOC and shaking, suspected seizures. stable. on depaote and EEG monitiorin. MRI brain ordered then subsequently cancled  - will clarify with neuro, is MRI desired? if so cannot get it now while getting EEG monitoring  - cont depakote

## 2018-05-06 LAB — VALPROATE SERPL-MCNC: 60 UG/ML — SIGNIFICANT CHANGE UP (ref 50–100)

## 2018-05-06 RX ADMIN — ENOXAPARIN SODIUM 40 MILLIGRAM(S): 100 INJECTION SUBCUTANEOUS at 05:40

## 2018-05-06 RX ADMIN — DIVALPROEX SODIUM 500 MILLIGRAM(S): 500 TABLET, DELAYED RELEASE ORAL at 17:44

## 2018-05-06 RX ADMIN — DIVALPROEX SODIUM 500 MILLIGRAM(S): 500 TABLET, DELAYED RELEASE ORAL at 05:39

## 2018-05-06 NOTE — PROGRESS NOTE ADULT - ASSESSMENT
53 year old woman with history of bipolar disorder and seizures admitted with possible breakthrough seizure    EEG overnight shows a regular and reactive background with a PDR of 9 hz and normal sleep patterns. There are rare sharp transients of unclear epileptogenic potential; so subclinical or clinical seizure activity

## 2018-05-06 NOTE — PROGRESS NOTE ADULT - SUBJECTIVE AND OBJECTIVE BOX
ELBA MAXWELL 53y Female   MRN#: [id number]    CC: f/u seizures      INTERVAL EVENTS INCLUDE:  no interval red flags reported  care reviewed with neurologist earlier today, who advised keep on same dose depakote but recheck level to see if adhenrece was previosuly an issue. also advised the decision on MRI will be after completion of video EEG, possibly as outpatient      ROS:          Vital Signs Last 24 Hrs  Vital Signs Last 24 Hrs  T(C): 35.6 (06 May 2018 11:15), Max: 36.2 (05 May 2018 22:08)  T(F): 96.1 (06 May 2018 11:15), Max: 97.1 (05 May 2018 22:08)  HR: 78 (06 May 2018 11:15) (64 - 85)  BP: 113/60 (06 May 2018 11:15) (113/60 - 133/75)  BP(mean): --  RR: 16 (06 May 2018 11:15) (16 - 16)  SpO2: --          PHYSICAL EXAM:    GENERAL: NAD, well-developed, AAOx3, friendly  PULMONARY: Clear to auscultation bilaterally; No wheeze  CARDIOVASCULAR: Regular rate and rhythm; No murmurs, rubs, or gallops  GASTROINTESTINAL: Soft, Nontender, Nondistended; Bowel sounds present  MUSCULOSKELETAL:  2+ Peripheral Pulses, No clubbing, cyanosis, or edema  NEUROLOGY: moving all ext. gait stable a symmetric  SKIN: dry skin to right shin        DATA:    valproic acid level 60  no other new labs ELBA MAXWELL 53y Female   MRN#: [id number]    CC: f/u seizures      INTERVAL EVENTS INCLUDE:  no interval red flags reported  care reviewed with neurologist earlier today, who advised keep on same dose depakote but recheck level to see if adhenrece was previosuly an issue. also advised the decision on MRI will be after completion of video EEG, possibly as outpatient      ROS:  no complaitns or paion  nio fevers or chills  no seizures or spells  feels well overall        Vital Signs Last 24 Hrs  Vital Signs Last 24 Hrs  T(C): 35.6 (06 May 2018 11:15), Max: 36.2 (05 May 2018 22:08)  T(F): 96.1 (06 May 2018 11:15), Max: 97.1 (05 May 2018 22:08)  HR: 78 (06 May 2018 11:15) (64 - 85)  BP: 113/60 (06 May 2018 11:15) (113/60 - 133/75)  BP(mean): --  RR: 16 (06 May 2018 11:15) (16 - 16)  SpO2: --          PHYSICAL EXAM:    GENERAL: NAD, well-developed, AAOx3, friendly  PULMONARY: Clear to auscultation bilaterally; No wheeze  CARDIOVASCULAR: Regular rate and rhythm; No murmurs, rubs, or gallops  GASTROINTESTINAL: Soft, Nontender, Bowel sounds present  MUSCULOSKELETAL:  BL No clubbing, cyanosis, or edema        DATA:    valproic acid level 60  no other new labs

## 2018-05-06 NOTE — PROGRESS NOTE ADULT - PROBLEM SELECTOR PLAN 1
episodes of LOC and shaking, suspected seizures. stable. on depakote and EEG monitoring.   - MRI brain as outpatient per neuro recs  - cont depakote  - f/u neuro recs

## 2018-05-06 NOTE — PROGRESS NOTE ADULT - ASSESSMENT
54 yo female with PMH that includes infrequent episodes of LOC and shaking seizure-like for which she had never seen a neurologist, bipolar disorder for which she gets depakote, who presented for eval for tonic clonic seizure withnessed by bystanders, lasting 1 minute. She was initially evaluated at the [place] site, where she was noted with abnormal EKG and for which cardiac enzymes were sent. she is now at the [facility] for ongoing neurologic evaluation in epilepsy unit    PMD: Dr. Armijo   Psych care: ACT team      PAST MEDICAL & SURGICAL HISTORY  Bipolar 1 disorder  Seizures  No significant past surgical history      ALLERGIES:  No Known Drug Allergies  Raspberries (Unknown)

## 2018-05-06 NOTE — PROGRESS NOTE ADULT - SUBJECTIVE AND OBJECTIVE BOX
53 year old woman with long history of bipolar disorder treated with Depakote 500 mg bid. She reports seizure GTC beginning 3-4 years ago but never took any medication for it. Patient admitted with breakthrough seizure  She describes the spells as stopping and shaking for 1 minute no foaming at the mouth, tongue biting or loss of bladder control.  States she gets them about twice a year and experiences a "weird feeling" which comes over her before she starts shaking.    No events overnight. Patient eating and sleeping well    Vital Signs Last 24 Hrs  T(C): 35.9 (06 May 2018 05:57), Max: 36.6 (05 May 2018 13:53)  T(F): 96.6 (06 May 2018 05:57), Max: 97.8 (05 May 2018 13:53)  HR: 64 (06 May 2018 05:57) (64 - 85)  BP: 133/75 (06 May 2018 05:57) (114/53 - 133/75)  RR: 16 (06 May 2018 05:57) (16 - 16)    Neurologic Examination:  General:  Appearance is consistent with chronologic age.   General: The patient is oriented to person, place, time and date.  Recent and remote memory intact.  Fund of knowledge is intact and normal.  Language with normal repetition, comprehension and naming.  Nondysarthric.    Cranial nerves: intact VA, VFF.  EOMI w/o nystagmus.  PERRL.  No ptosis/weakness of eyelid closure.  Facial sensation is normal with normal bite.  No facial asymmetry.  Hearing grossly intact b/l.  Palate elevates midline.  Tongue midline.  Motor examination:   Normal tone, bulk and range of motion.  No tenderness, twitching, tremors or involuntary movements.  Formal Muscle Strength Testing: (MRC grade R/L) 5/5 UE; 5/5 LE.  No observable drift.  Reflexes:   2+ b/l  biceps, triceps, brachioradialis, patella and Achilles.  Plantar response downgoing b/l.    Sensory examination:   Intact to light touch, temperature and vibration in all extremities.  Cerebellum:   FTN/HKS intact with normal LOC in all limbs.  No dysmetria or dysdiadokinesia.    Gait narrow based and normal.      MEDICATIONS  (STANDING):  diVALproex  milliGRAM(s) Oral two times a day  enoxaparin Injectable 40 milliGRAM(s) SubCutaneous every 24 hours    MEDICATIONS  (PRN):  acetaminophen   Tablet. 650 milliGRAM(s) Oral every 6 hours PRN Moderate Pain (4 - 6)

## 2018-05-06 NOTE — PROGRESS NOTE ADULT - PROBLEM SELECTOR PLAN 1
1. Continue Video EEG monitoring to further characterize events and assess epileptic activity  2. Continue Depakote 500mg po bid  3. Obtain repeat VPA level  4. Seizure precautions  5. Will obtain MRI of brain after EEG/as outpatient    I discussed all of the above in detail with the patient and the hospitalist

## 2018-05-06 NOTE — PROGRESS NOTE ADULT - PROBLEM SELECTOR PLAN 3
unclear if adherent with Depakote at home as level here mario on same reported home regimen  - f/u with ACT team and may need to obtain psych input

## 2018-05-07 LAB
ALBUMIN SERPL ELPH-MCNC: 3.8 G/DL — SIGNIFICANT CHANGE UP (ref 3.5–5.2)
ALP SERPL-CCNC: 109 U/L — SIGNIFICANT CHANGE UP (ref 30–115)
ALT FLD-CCNC: 12 U/L — SIGNIFICANT CHANGE UP (ref 0–41)
ANION GAP SERPL CALC-SCNC: 12 MMOL/L — SIGNIFICANT CHANGE UP (ref 7–14)
AST SERPL-CCNC: 13 U/L — SIGNIFICANT CHANGE UP (ref 0–41)
BILIRUB SERPL-MCNC: <0.2 MG/DL — SIGNIFICANT CHANGE UP (ref 0.2–1.2)
BUN SERPL-MCNC: 15 MG/DL — SIGNIFICANT CHANGE UP (ref 10–20)
CALCIUM SERPL-MCNC: 9.5 MG/DL — SIGNIFICANT CHANGE UP (ref 8.5–10.1)
CHLORIDE SERPL-SCNC: 103 MMOL/L — SIGNIFICANT CHANGE UP (ref 98–110)
CO2 SERPL-SCNC: 27 MMOL/L — SIGNIFICANT CHANGE UP (ref 17–32)
CREAT SERPL-MCNC: 1 MG/DL — SIGNIFICANT CHANGE UP (ref 0.7–1.5)
GLUCOSE SERPL-MCNC: 110 MG/DL — HIGH (ref 70–99)
HCT VFR BLD CALC: 40.7 % — SIGNIFICANT CHANGE UP (ref 37–47)
HGB BLD-MCNC: 13.6 G/DL — SIGNIFICANT CHANGE UP (ref 12–16)
MAGNESIUM SERPL-MCNC: 2.1 MG/DL — SIGNIFICANT CHANGE UP (ref 1.8–2.4)
MCHC RBC-ENTMCNC: 31 PG — SIGNIFICANT CHANGE UP (ref 27–31)
MCHC RBC-ENTMCNC: 33.4 G/DL — SIGNIFICANT CHANGE UP (ref 32–37)
MCV RBC AUTO: 92.7 FL — SIGNIFICANT CHANGE UP (ref 81–99)
NRBC # BLD: 0 /100 WBCS — SIGNIFICANT CHANGE UP (ref 0–0)
PLATELET # BLD AUTO: 260 K/UL — SIGNIFICANT CHANGE UP (ref 130–400)
POTASSIUM SERPL-MCNC: 4.3 MMOL/L — SIGNIFICANT CHANGE UP (ref 3.5–5)
POTASSIUM SERPL-SCNC: 4.3 MMOL/L — SIGNIFICANT CHANGE UP (ref 3.5–5)
PROT SERPL-MCNC: 6.7 G/DL — SIGNIFICANT CHANGE UP (ref 6–8)
RBC # BLD: 4.39 M/UL — SIGNIFICANT CHANGE UP (ref 4.2–5.4)
RBC # FLD: 13.3 % — SIGNIFICANT CHANGE UP (ref 11.5–14.5)
SODIUM SERPL-SCNC: 142 MMOL/L — SIGNIFICANT CHANGE UP (ref 135–146)
VALPROATE SERPL-MCNC: 65 UG/ML — SIGNIFICANT CHANGE UP (ref 50–100)
WBC # BLD: 5.03 K/UL — SIGNIFICANT CHANGE UP (ref 4.8–10.8)
WBC # FLD AUTO: 5.03 K/UL — SIGNIFICANT CHANGE UP (ref 4.8–10.8)

## 2018-05-07 RX ADMIN — Medication 650 MILLIGRAM(S): at 22:25

## 2018-05-07 RX ADMIN — Medication 650 MILLIGRAM(S): at 05:06

## 2018-05-07 RX ADMIN — Medication 650 MILLIGRAM(S): at 01:20

## 2018-05-07 RX ADMIN — ENOXAPARIN SODIUM 40 MILLIGRAM(S): 100 INJECTION SUBCUTANEOUS at 05:07

## 2018-05-07 RX ADMIN — DIVALPROEX SODIUM 500 MILLIGRAM(S): 500 TABLET, DELAYED RELEASE ORAL at 17:40

## 2018-05-07 RX ADMIN — DIVALPROEX SODIUM 500 MILLIGRAM(S): 500 TABLET, DELAYED RELEASE ORAL at 05:07

## 2018-05-07 NOTE — PROGRESS NOTE ADULT - ASSESSMENT
52 yo female with PMH that includes infrequent episodes of LOC and shaking seizure-like for which she had never seen a neurologist, bipolar disorder for which she gets depakote, who presented for eval for tonic clonic seizure withnessed by bystanders, lasting 1 minute. She was initially evaluated at the [place] site, where she was noted with abnormal EKG and for which cardiac enzymes were sent. she is now at the [facility] for ongoing neurologic evaluation in epilepsy unit    PMD: Dr. Armijo   Psych care: ACT team      PAST MEDICAL & SURGICAL HISTORY  Bipolar 1 disorder  Seizures  No significant past surgical history      ALLERGIES:  No Known Drug Allergies  Raspberries (Unknown) 52 yo female with PMH that includes infrequent episodes of LOC and shaking seizure-like for which she had never seen a neurologist, bipolar disorder for which she gets depakote, who presented for eval for tonic clonic seizure withnessed by bystanders, lasting 1 minute. She was initially evaluated at the [place] site, where she was noted with abnormal EKG and for which cardiac enzymes were sent. she is now at the [facility] for ongoing neurologic evaluation in epilepsy unit    PMD: Dr. Armijo   Psych care: ACT team      PAST MEDICAL HISTORY  Bipolar 1 disorder  Seizures

## 2018-05-07 NOTE — PROGRESS NOTE ADULT - PROBLEM SELECTOR PLAN 3
unclear if adherent with Depakote at home as level here mario on same reported home regimen  - f/u with ACT team and may need to obtain psych input I reviewed with PCP Dr Bangura and with  re low med levels on admission, possible medication nonadherence.  will notify ACT team and refer for VNS as well I reviewed with PCP Dr Bangura and with  re low med levels on admission, likely medication nonadherence.  will notify ACT team and refer for VNS as well

## 2018-05-07 NOTE — PROGRESS NOTE ADULT - PROBLEM SELECTOR PLAN 2
early repol vs LVH vs other. needs outpatient PCP followup for TTE early repol vs LVH vs other. needs outpatient PCP followup for TTE. I reminded her of this

## 2018-05-07 NOTE — PROGRESS NOTE ADULT - SUBJECTIVE AND OBJECTIVE BOX
Epilepsy Attending Note:     ELBA MAXWELL    53y Female  MRN [id number]    Vital Signs Last 24 Hrs  T(C): 36.1 (07 May 2018 05:31), Max: 36.1 (07 May 2018 05:31)  T(F): 97 (07 May 2018 05:31), Max: 97 (07 May 2018 05:31)  HR: 82 (07 May 2018 05:31) (81 - 82)  BP: 111/57 (07 May 2018 05:31) (92/55 - 111/57)  BP(mean): --  RR: 16 (07 May 2018 05:31) (16 - 16)  SpO2: --                MEDICATIONS  (STANDING):  diVALproex  milliGRAM(s) Oral two times a day  enoxaparin Injectable 40 milliGRAM(s) SubCutaneous every 24 hours    MEDICATIONS  (PRN):  acetaminophen   Tablet. 650 milliGRAM(s) Oral every 6 hours PRN Moderate Pain (4 - 6)  LORazepam   Injectable 2 milliGRAM(s) IV Push three times a day PRN for generalized tonic-clonic seizure lasting longer than 2 minutes      Valproic Acid Level, Serum: 60.0 ug/mL [50.0 - 100.0] (05-06-18 @ 15:33)        VEEG in the last 24 hours:    Background----------8-9 hz    Focal and generalized slowing----none    Intericatal activity----no clear epileptiform discharge    Events-----she made her self disconnected multiple times and the recording also contained significant amout of movement artifact    Seizures---none    Impression:  history of an event not well characterized called as seizure    Plan - will continue the monitoring           HV/PS

## 2018-05-07 NOTE — PROGRESS NOTE ADULT - PROBLEM SELECTOR PLAN 1
episodes of LOC and shaking, suspected seizures. stable. on depakote and EEG monitoring.   - MRI brain as outpatient per neuro recs  - cont depakote  - f/u neuro recs episodes of LOC and shaking, suspected seizures. stable. on depakote and EEG monitoring. likely nonadherent with depakote at home  - MRI brain as outpatient per neuro recs  - cont depakote  - on video EEG monitoring Episodes of LOC and shaking, suspected seizures. stable. on Depakote and EEG monitoring. likely nonadherent with depakote at home  - MRI brain as outpatient per neuro recs  - cont depakote  - on video EEG monitoring

## 2018-05-07 NOTE — PROGRESS NOTE ADULT - SUBJECTIVE AND OBJECTIVE BOX
Neurology/Epilepsy NP:    Additional history is obtained from patient's cousin Hazel [phone]  [place] when Hazel heard people screaming, and then saw patient on the ground with eyes closed having shoulder movements side-to-side, not responding to voice x1-2 min, then was back to baseline immediately. EMS was activated and patient was taken to ED [place], then transferred to [facility] the weekend. Patient had 1 other similar episode while at home, also witnessed by cousin, but patient did not go to the ED. Patient is Rx Depakote 500mg q12hrs by her psychiatrist, but not compliant. Patient stopped it completely for several months but resumed taking it recently, though it is unclear how often she was taking it at home.  Patient was not followed by neuro as out-patient.

## 2018-05-08 ENCOUNTER — TRANSCRIPTION ENCOUNTER (OUTPATIENT)
Age: 53
End: 2018-05-08

## 2018-05-08 VITALS
RESPIRATION RATE: 18 BRPM | DIASTOLIC BLOOD PRESSURE: 81 MMHG | HEART RATE: 72 BPM | SYSTOLIC BLOOD PRESSURE: 114 MMHG | TEMPERATURE: 96 F

## 2018-05-08 RX ADMIN — ENOXAPARIN SODIUM 40 MILLIGRAM(S): 100 INJECTION SUBCUTANEOUS at 05:48

## 2018-05-08 RX ADMIN — DIVALPROEX SODIUM 500 MILLIGRAM(S): 500 TABLET, DELAYED RELEASE ORAL at 05:48

## 2018-05-08 NOTE — PROGRESS NOTE ADULT - ATTENDING COMMENTS
Pt examined.  See impression/plan above.
Appt made for patient with PCP Dr Giron 5/16 at 1pm    Appt with neurology 5/29 at 1:30pm

## 2018-05-08 NOTE — DISCHARGE NOTE ADULT - PROVIDER TOKENS
TOKEN:'[id number]',FREE:[LAST:[Neurology Clinic],PHONE:[(   )    -],FAX:[(   )    -],ADDRESS:[May 29, 2018 at 1:30 pm    [address] [place] [address]  [phone]]]

## 2018-05-08 NOTE — DISCHARGE NOTE ADULT - CARE PROVIDER_API CALL
Milad Hernandez), Internal Medicine  [address] [place] [address]  Phone: [phone]  Fax: [phone]    Neurology Clinic,   May 29, 2018 at 1:30 pm    [address] [place] [address]  [phone]  Phone: (   )    -  Fax: (   )    -

## 2018-05-08 NOTE — PROGRESS NOTE ADULT - PROBLEM SELECTOR PLAN 3
I reviewed with PCP Dr Bangura and with  re low med levels on admission, likely medication nonadherence.  will notify ACT team and refer for VNS as well

## 2018-05-08 NOTE — PROGRESS NOTE ADULT - SUBJECTIVE AND OBJECTIVE BOX
Epilepsy Attending Note:     ELBA MAXWELL    53y Female  MRN [id number]    Vital Signs Last 24 Hrs  T(C): 36.4 (08 May 2018 06:00), Max: 36.4 (08 May 2018 06:00)  T(F): 97.6 (08 May 2018 06:00), Max: 97.6 (08 May 2018 06:00)  HR: 62 (08 May 2018 06:00) (62 - 95)  BP: 109/72 (08 May 2018 06:00) (104/59 - 126/59)  BP(mean): --  RR: 18 (08 May 2018 06:00) (18 - 18)  SpO2: --                          13.6   5.03  )-----------( 260      ( 07 May 2018 15:46 )             40.7       05-07    142  |  103  |  15  ----------------------------<  110<H>  4.3   |  27  |  1.0    Ca    9.5      07 May 2018 15:46  Mg     2.1     05-07    TPro  6.7  /  Alb  3.8  /  TBili  <0.2  /  DBili  x   /  AST  13  /  ALT  12  /  AlkPhos  109  05-07      MEDICATIONS  (STANDING):  diVALproex  milliGRAM(s) Oral two times a day  enoxaparin Injectable 40 milliGRAM(s) SubCutaneous every 24 hours    MEDICATIONS  (PRN):  acetaminophen   Tablet. 650 milliGRAM(s) Oral every 6 hours PRN Moderate Pain (4 - 6)  LORazepam   Injectable 2 milliGRAM(s) IV Push three times a day PRN for generalized tonic-clonic seizure lasting longer than 2 minutes      Valproic Acid Level, Serum: 65.0 ug/mL [50.0 - 100.0] (05-07-18 @ 15:46)  Valproic Acid Level, Serum: 60.0 ug/mL [50.0 - 100.0] (05-06-18 @ 15:33)        VEEG in the last 24 hours:     Background---------8-9    Focal and generalized slowing----right hemispheric mainly fronto-temporal focal slowing    Intericatal activity-------large number of right hemispheric sharps and spikes    Events----none    Seizures----no clinical or sub-clinical seizures    Impression:evidence for potential for partial seizure from the right hemisphere .    Plan - DC on depakote, discussed the results and encouraged her to be compliant.            will have a F/U with neurology and will have MRI as outpatient

## 2018-05-08 NOTE — PROGRESS NOTE ADULT - PROBLEM SELECTOR PLAN 1
confirmed with abnormal EEG. counsled on adherence with depakote and f.u with PCP and neurologist. needs MRI brain as outpatient confirmed with abnormal EEG. counseled on adherence with depakote and f.u with PCP and neurologist. needs MRI brain as outpatient

## 2018-05-08 NOTE — DISCHARGE NOTE ADULT - PLAN OF CARE
cotrol and prevention of a future episode please continue to take depatote and folowup with primary care doctor and with neurologist needs further evaluation please followup with primary care doctor for and echo, or an ultrasound of the heart

## 2018-05-08 NOTE — PROGRESS NOTE ADULT - SUBJECTIVE AND OBJECTIVE BOX
ELBA MAXWELL 53y Female   MRN#: [id number]    CC: called by neuro patient cleared for DC      INTERVAL EVENTS INCLUDE:  Appt made for patient with PCP Dr Giron 5/16 at 1pm  care reviewed with neurology. She does have an abnormal EEG and was advised to take depakote and adhere to it, and she is scheduled for a followup visit with neurology 5/29 at 1:30pm  no other interval events or red flags      ROS:            Vital Signs Last 24 Hrs  T(C): 36.4 (08 May 2018 06:00), Max: 36.4 (08 May 2018 06:00)  T(F): 97.6 (08 May 2018 06:00), Max: 97.6 (08 May 2018 06:00)  HR: 62 (08 May 2018 06:00) (62 - 95)  BP: 109/72 (08 May 2018 06:00) (104/59 - 126/59)  BP(mean): --  RR: 18 (08 May 2018 06:00) (18 - 18)  SpO2: --          PHYSICAL EXAM:    GENERAL: NAD, well-developed, AAOx3  PULMONARY: Clear to auscultation bilaterally; No wheeze  CARDIOVASCULAR: Regular rate and rhythm; No murmurs, rubs, or gallops  GASTROINTESTINAL: Soft, Nontender, Bowel sounds present  MUSCULOSKELETAL:  BL No clubbing, cyanosis, or edema        DATA:                          13.6   5.03  )-----------( 260      ( 07 May 2018 15:46 )             40.7     05-07    142  |  103  |  15  ----------------------------<  110<H>  4.3   |  27  |  1.0    Ca    9.5      07 May 2018 15:46  Mg     2.1     05-07    TPro  6.7  /  Alb  3.8  /  TBili  <0.2  /  DBili  x   /  AST  13  /  ALT  12  /  AlkPhos  109  05-07 ELBA MAXWELL 53y Female   MRN#: [id number]    CC: called by neuro patient cleared for DC      INTERVAL EVENTS INCLUDE:  Appt made for patient with PCP Dr Giron 5/16 at 1pm  care reviewed with neurology. She does have an abnormal EEG and was advised to take depakote and adhere to it, and she is scheduled for a followup visit with neurology 5/29 at 1:30pm  no other interval events or red flags      ROS:  no complaints or pain  no fevers or chills  no nausea  or [id number]  reveweivewd hospital course and neuro recs  she understands needs for adhenrence with meds and followup          Vital Signs Last 24 Hrs  T(C): 36.4 (08 May 2018 06:00), Max: 36.4 (08 May 2018 06:00)  T(F): 97.6 (08 May 2018 06:00), Max: 97.6 (08 May 2018 06:00)  HR: 62 (08 May 2018 06:00) (62 - 95)  BP: 109/72 (08 May 2018 06:00) (104/59 - 126/59)  BP(mean): --  RR: 18 (08 May 2018 06:00) (18 - 18)  SpO2: --          PHYSICAL EXAM:    GENERAL: NAD, well-developed,  PULMONARY: Clear to auscultation bilaterally; No wheeze  CARDIOVASCULAR: Regular rate and rhythm; No murmurs, rubs, or gallops  GASTROINTESTINAL: Soft, Nontender  NEURO:  gait stable and independent        DATA:                          13.6   5.03  )-----------( 260      ( 07 May 2018 15:46 )             40.7     05-07    142  |  103  |  15  ----------------------------<  110<H>  4.3   |  27  |  1.0    Ca    9.5      07 May 2018 15:46  Mg     2.1     05-07    TPro  6.7  /  Alb  3.8  /  TBili  <0.2  /  DBili  x   /  AST  13  /  ALT  12  /  AlkPhos  109  05-07

## 2018-05-08 NOTE — PROGRESS NOTE ADULT - SUBJECTIVE AND OBJECTIVE BOX
Epilepsy NP Note:    Follow up appointment is scheduled at [facility]  for May 29, 2018 at 1:30 pm    [address] [place] [address]  [phone]    Information is given to the patient and hospitalist.    05-08-18 @ 10:55

## 2018-05-08 NOTE — PROGRESS NOTE ADULT - PROBLEM SELECTOR PLAN 4
stable for discharge. I spent > 30 minutes on discharge planning, including collaberation with the interdiciplinary team, preparation of discharge paperwork, discharge interview / exam, and discussion with patient regarding hospital course, post discharge care, warning signs necessitating return to the hospital, and the importance of outpatient followup. all questions answered. stable for discharge. I spent > 30 minutes on discharge planning, including collaboration with the interdisciplinary team, preparation of discharge paperwork, discharge interview / exam, and discussion with patient regarding hospital course, post discharge care, warning signs necessitating return to the hospital, and the importance of outpatient followup. all questions answered.

## 2018-05-08 NOTE — DISCHARGE NOTE ADULT - MEDICATION SUMMARY - MEDICATIONS TO TAKE
I will START or STAY ON the medications listed below when I get home from the hospital:    Depakote  -- 500 milligram(s) by mouth 2 times a day  -- Indication: For Bipolar 1 disorder as well as seizure disorder

## 2018-05-08 NOTE — PROGRESS NOTE ADULT - PROVIDER SPECIALTY LIST ADULT
Hospitalist
Internal Medicine
Neurology
Hospitalist

## 2018-05-08 NOTE — PROGRESS NOTE ADULT - PROBLEM SELECTOR PROBLEM 4
Coordination of complex care

## 2018-05-08 NOTE — PROGRESS NOTE ADULT - ASSESSMENT
54 yo female with PMH that includes infrequent episodes of LOC and shaking seizure-like for which she had never seen a neurologist, bipolar disorder for which she gets depakote, who presented for eval for tonic clonic seizure withnessed by bystanders, lasting 1 minute. She was initially evaluated at the [place] site, where she was noted with abnormal EKG and for which cardiac enzymes were sent. she is now at the [facility] for ongoing neurologic evaluation in epilepsy unit    PMD: Dr. Armijo   Psych care: ACT team      PAST MEDICAL HISTORY  Bipolar 1 disorder  Seizures

## 2018-05-08 NOTE — DISCHARGE NOTE ADULT - HOSPITAL COURSE
52 yo female with PMH that includes bipolar disorder,  nonahderent with depakote, who presented for eval for tonic clonic seizure withnessed by bystanders. she underwent video EEG that was ot normal. he was maintained on her usual depakote and her levels became theraputic, lending evidence to medication nonadherence at home. she was counsled re adherence with medications and followup. and appointsmentss made for her with primary care doctor and neurologitst.  to nitify ACT team as well      Of note is that she has abnormal EKG and needs an outpatient echo  She also needs an outpatient MRI of the brain to complete seizure workup      Appt made for patient with PCP Dr Giron 5/16 at 1pm  Appt with neurology 5/29 at 1:30pm.

## 2018-05-08 NOTE — DISCHARGE NOTE ADULT - CARE PLAN
Principal Discharge DX:	Seizures  Goal:	cotrol and prevention of a future episode  Assessment and plan of treatment:	please continue to take depatote and folowup with primary care doctor and with neurologist  Secondary Diagnosis:	Abnormal EKG  Goal:	needs further evaluation  Assessment and plan of treatment:	please followup with primary care doctor for and echo, or an ultrasound of the heart

## 2018-05-08 NOTE — DISCHARGE NOTE ADULT - PATIENT PORTAL LINK FT
You can access the [facility] Patient Portal, offered by [facility], by registering with the following website: [email or web address]

## 2018-05-16 ENCOUNTER — OUTPATIENT (OUTPATIENT)
Dept: OUTPATIENT SERVICES | Facility: HOSPITAL | Age: 53
LOS: 1 days | Discharge: HOME | End: 2018-05-16

## 2018-05-16 ENCOUNTER — APPOINTMENT (OUTPATIENT)
Dept: INTERNAL MEDICINE | Facility: CLINIC | Age: 53
End: 2018-05-16

## 2018-05-16 VITALS
WEIGHT: 260 LBS | DIASTOLIC BLOOD PRESSURE: 82 MMHG | SYSTOLIC BLOOD PRESSURE: 118 MMHG | HEART RATE: 97 BPM | BODY MASS INDEX: 41.78 KG/M2 | HEIGHT: 66 IN

## 2018-05-16 NOTE — HISTORY OF PRESENT ILLNESS
[de-identified] : 53 y.o female patient with PMH of bipolar disorder, schizophrenia seizure, chronic back pain, syncope, obesity, presents to the office for a follow-up visit after discharge from hospital.\par Patient was admitted to the hospital from 5/3/2018 to 5/8/2018 fro tonic clonic seizure and attributed to non-adherence to AED. Video EEG done at [facility] was significant for evidence for potential for partial seizure from the right hemisphere. She was evaluated by neurology and discharged on Depakote \par Patient reports being compliant with medications. Patient complains of same back pain that is controlled with Tylenol OTC. Also, she has been feeling more and more short of breath when walking uphill (patient used to be able to walk with the same effort with no symptoms)

## 2018-05-16 NOTE — ASSESSMENT
[FreeTextEntry1] : 53 y.o female patient with PMH of bipolar disorder, schizophrenia seizure, chronic back pain, syncope, obesity, presents to the office for a follow-up visit after discharge from hospital.\par \par # Seizure\par - Continue Depakote at current dose for now\par - Patient has an appointment with neurology\par \par # Shortness of breath on exertion\par - Might be due to obesity\par - LVH seen on EKG\par - Check 2D echo\par \par # Bipolar/Schizophrenia\par - Patient having somnolence, memory loss and attention problems\par - F/U with psychiatry for possible dose adjustment/change of medications\par \par # Back pain\par - Controlled with Tylenol\par - Continue Tylenol OTC\par \par # Health maintenance\par - Patient missed EGD/Colonoscopy; will send preparation to pharmacy; F/U with GI for scheduling of EGD/Colonoscopy\par - Check HbA1c and lipid profile in August 2018\par - Check Mammogram, PAP smear

## 2018-05-16 NOTE — PHYSICAL EXAM
[No Acute Distress] : no acute distress [No JVD] : no jugular venous distention [Supple] : supple [Clear to Auscultation] : lungs were clear to auscultation bilaterally [No Accessory Muscle Use] : no accessory muscle use [Normal Rate] : normal rate  [Regular Rhythm] : with a regular rhythm [Normal S1, S2] : normal S1 and S2 [No Edema] : there was no peripheral edema [Soft] : abdomen soft [Non Tender] : non-tender [Non-distended] : non-distended [de-identified] : Somnolence

## 2018-05-17 DIAGNOSIS — G40.909 EPILEPSY, UNSPECIFIED, NOT INTRACTABLE, WITHOUT STATUS EPILEPTICUS: ICD-10-CM

## 2018-05-17 DIAGNOSIS — F31.9 BIPOLAR DISORDER, UNSPECIFIED: ICD-10-CM

## 2018-05-17 DIAGNOSIS — R06.02 SHORTNESS OF BREATH: ICD-10-CM

## 2018-05-18 DIAGNOSIS — G40.409 OTHER GENERALIZED EPILEPSY AND EPILEPTIC SYNDROMES, NOT INTRACTABLE, WITHOUT STATUS EPILEPTICUS: ICD-10-CM

## 2018-05-18 DIAGNOSIS — Z91.018 ALLERGY TO OTHER FOODS: ICD-10-CM

## 2018-05-18 DIAGNOSIS — D72.829 ELEVATED WHITE BLOOD CELL COUNT, UNSPECIFIED: ICD-10-CM

## 2018-05-18 DIAGNOSIS — F31.9 BIPOLAR DISORDER, UNSPECIFIED: ICD-10-CM

## 2018-05-18 DIAGNOSIS — R56.9 UNSPECIFIED CONVULSIONS: ICD-10-CM

## 2018-05-18 DIAGNOSIS — Z91.14 PATIENT'S OTHER NONCOMPLIANCE WITH MEDICATION REGIMEN: ICD-10-CM

## 2018-05-18 DIAGNOSIS — R94.31 ABNORMAL ELECTROCARDIOGRAM [ECG] [EKG]: ICD-10-CM

## 2018-05-29 ENCOUNTER — APPOINTMENT (OUTPATIENT)
Dept: NEUROLOGY | Facility: CLINIC | Age: 53
End: 2018-05-29

## 2018-05-29 ENCOUNTER — OUTPATIENT (OUTPATIENT)
Dept: OUTPATIENT SERVICES | Facility: HOSPITAL | Age: 53
LOS: 1 days | Discharge: HOME | End: 2018-05-29

## 2018-05-29 VITALS
HEART RATE: 92 BPM | HEIGHT: 66 IN | SYSTOLIC BLOOD PRESSURE: 96 MMHG | BODY MASS INDEX: 41.78 KG/M2 | WEIGHT: 260 LBS | DIASTOLIC BLOOD PRESSURE: 62 MMHG

## 2018-05-29 DIAGNOSIS — R55 SYNCOPE AND COLLAPSE: ICD-10-CM

## 2018-06-05 ENCOUNTER — LABORATORY RESULT (OUTPATIENT)
Age: 53
End: 2018-06-05

## 2018-06-06 ENCOUNTER — OUTPATIENT (OUTPATIENT)
Dept: OUTPATIENT SERVICES | Facility: HOSPITAL | Age: 53
LOS: 1 days | Discharge: HOME | End: 2018-06-06

## 2018-06-06 DIAGNOSIS — G40.909 EPILEPSY, UNSPECIFIED, NOT INTRACTABLE, WITHOUT STATUS EPILEPTICUS: ICD-10-CM

## 2018-06-07 DIAGNOSIS — G40.909 EPILEPSY, UNSPECIFIED, NOT INTRACTABLE, WITHOUT STATUS EPILEPTICUS: ICD-10-CM

## 2018-06-08 ENCOUNTER — OUTPATIENT (OUTPATIENT)
Dept: OUTPATIENT SERVICES | Facility: HOSPITAL | Age: 53
LOS: 1 days | Discharge: HOME | End: 2018-06-08

## 2018-06-08 DIAGNOSIS — R73.9 HYPERGLYCEMIA, UNSPECIFIED: ICD-10-CM

## 2018-06-08 DIAGNOSIS — Z00.00 ENCOUNTER FOR GENERAL ADULT MEDICAL EXAMINATION WITHOUT ABNORMAL FINDINGS: ICD-10-CM

## 2018-06-27 ENCOUNTER — APPOINTMENT (OUTPATIENT)
Dept: INTERNAL MEDICINE | Facility: CLINIC | Age: 53
End: 2018-06-27

## 2018-06-27 ENCOUNTER — OUTPATIENT (OUTPATIENT)
Dept: OUTPATIENT SERVICES | Facility: HOSPITAL | Age: 53
LOS: 1 days | Discharge: HOME | End: 2018-06-27

## 2018-06-27 VITALS
HEART RATE: 91 BPM | BODY MASS INDEX: 40.02 KG/M2 | SYSTOLIC BLOOD PRESSURE: 114 MMHG | HEIGHT: 66 IN | TEMPERATURE: 96.7 F | WEIGHT: 249 LBS | DIASTOLIC BLOOD PRESSURE: 82 MMHG | RESPIRATION RATE: 18 BRPM

## 2018-06-27 DIAGNOSIS — R06.09 OTHER FORMS OF DYSPNEA: ICD-10-CM

## 2018-06-27 DIAGNOSIS — R73.03 PREDIABETES: ICD-10-CM

## 2018-06-27 DIAGNOSIS — F31.9 BIPOLAR DISORDER, UNSPECIFIED: ICD-10-CM

## 2018-06-27 DIAGNOSIS — E66.9 OBESITY, UNSPECIFIED: ICD-10-CM

## 2018-06-27 DIAGNOSIS — G89.29 DORSALGIA, UNSPECIFIED: ICD-10-CM

## 2018-06-27 DIAGNOSIS — M54.9 DORSALGIA, UNSPECIFIED: ICD-10-CM

## 2018-06-27 DIAGNOSIS — G40.909 EPILEPSY, UNSPECIFIED, NOT INTRACTABLE, WITHOUT STATUS EPILEPTICUS: ICD-10-CM

## 2018-06-27 NOTE — HISTORY OF PRESENT ILLNESS
[FreeTextEntry1] : routine follow up  [de-identified] : 53 y.o female patient with PMH of bipolar disorder, schizophrenia, tonic clonic seizure non compliant with depakote , chronic back pain, syncope, obesity, presents to the office for a follow-up visit. Patient states she overall feels well with no active complaints. Her last seizure was 4 weeks ago for which she was hospitalized and d/c on depakote. She reports that she stopped taking her depakote for over 1 month because of diffuse body aches. Patient reports being compliant with medications. She had a recent MRI of the brain which showed a T2/Flair hyperintensity in the white matter which is non specific and could be d/t ischemia, gliosis or demyelination. She admits to exertional dyspnea after walking 3-4 blocks  Denies n/v/f/c, , chest pain, weight loss, night sweats.

## 2018-06-27 NOTE — ASSESSMENT
[FreeTextEntry1] : 53 y.o female patient with PMH of bipolar disorder, schizophrenia seizure, chronic back pain, syncope, obesity, presents to the office for a follow-up visit. \par \par 1) Seizure\par - Patient is non compliant with depakote, last depakote level very low at 14. Pt counseled that if she does not take the medication, likelihood of reccurent seizures is high and she will likely be hospitalized \par - Patient follows with neurology, Recent MRI show t2/falir hyperintensity in white matter non specific and could be 2/2 ischemia, gliosis or demyelination \par \par 2) Shortness of breath on exertion\par - Might be due to obesity\par - LVH seen on EKG\par - Check 2D echo\par \par 3) Bipolar/Schizophrenia\par -stable \par - Patient s F/U with psychiatry regularly\par -currently off bipolar and schizophrenia medications \par \par 4) Back pain\par - Controlled with Tylenol\par - Continue Tylenol OTC\par \par 5)Pre Diabetes\par -no indication for treatment, last HbA1c 6.1\par - refuses nutrition referral\par -counseled on diet and exercise \par \par 6)Health maintenance\par - Patient missed Colonoscopy; Send to GI, F/U with GI for scheduling of EGD/Colonoscopy\par -  HbA1c 6/5/18: 6.1, due for lipid panel next month \par -Last mammo 9/2017: Birads 1 , has appt for mammogram next month \par -Last pap smear 2017 and negative \par -Refuses nutrition consult

## 2018-06-27 NOTE — PHYSICAL EXAM
[No Acute Distress] : no acute distress [Well Nourished] : well nourished [No Respiratory Distress] : no respiratory distress  [Clear to Auscultation] : lungs were clear to auscultation bilaterally [No Accessory Muscle Use] : no accessory muscle use [Normal Rate] : normal rate  [Regular Rhythm] : with a regular rhythm [Normal S1, S2] : normal S1 and S2 [Soft] : abdomen soft [Non Tender] : non-tender [Normal Gait] : normal gait [Coordination Grossly Intact] : coordination grossly intact [No Focal Deficits] : no focal deficits [Speech Grossly Normal] : speech grossly normal [Normal Mood] : the mood was normal [Supple] : supple [No Edema] : there was no peripheral edema [No CVA Tenderness] : no CVA  tenderness

## 2018-06-27 NOTE — REVIEW OF SYSTEMS
[Joint Pain] : joint pain [Muscle Pain] : muscle pain [Back Pain] : back pain [Negative] : Psychiatric [Joint Stiffness] : no joint stiffness [Joint Swelling] : no joint swelling [Muscle Weakness] : no muscle weakness

## 2018-07-10 ENCOUNTER — OUTPATIENT (OUTPATIENT)
Dept: OUTPATIENT SERVICES | Facility: HOSPITAL | Age: 53
LOS: 1 days | Discharge: HOME | End: 2018-07-10

## 2018-07-10 DIAGNOSIS — R06.09 OTHER FORMS OF DYSPNEA: ICD-10-CM

## 2018-07-12 LAB
CHOLEST SERPL-MCNC: 233 MG/DL
CHOLEST/HDLC SERPL: 3.8 RATIO
HDLC SERPL-MCNC: 62 MG/DL
LDLC SERPL CALC-MCNC: 146 MG/DL
TRIGL SERPL-MCNC: 265 MG/DL

## 2018-08-16 PROBLEM — F31.9 BIPOLAR DISORDER, UNSPECIFIED: Chronic | Status: ACTIVE | Noted: 2018-05-03

## 2018-08-29 ENCOUNTER — OUTPATIENT (OUTPATIENT)
Dept: OUTPATIENT SERVICES | Facility: HOSPITAL | Age: 53
LOS: 1 days | Discharge: HOME | End: 2018-08-29

## 2018-08-29 VITALS
SYSTOLIC BLOOD PRESSURE: 122 MMHG | HEIGHT: 66 IN | WEIGHT: 250 LBS | RESPIRATION RATE: 18 BRPM | HEART RATE: 92 BPM | DIASTOLIC BLOOD PRESSURE: 93 MMHG | TEMPERATURE: 98 F

## 2018-08-29 VITALS — HEART RATE: 68 BPM | DIASTOLIC BLOOD PRESSURE: 80 MMHG | SYSTOLIC BLOOD PRESSURE: 118 MMHG | RESPIRATION RATE: 18 BRPM

## 2018-08-29 NOTE — CHART NOTE - NSCHARTNOTEFT_GEN_A_CORE
PACU ANESTHESIA ADMISSION NOTE      Procedure:   Post op diagnosis:      ____  Intubated  TV:______       Rate: ______      FiO2: ______    _x___  Patent Airway    _x___  Full return of protective reflexes    _x___  Full recovery from anesthesia / back to baseline status    Vitals:  T(C): 36.4 (08-29-18 @ 13:56), Max: 36.4 (08-29-18 @ 13:09)  HR: 67 (08-29-18 @ 14:13) (67 - 92)  BP: 114/88 (08-29-18 @ 14:13) (114/88 - 122/93)  RR: 20 (08-29-18 @ 14:13) (18 - 20)  SpO2: 100% (08-29-18 @ 14:13) (100% - 100%)    Mental Status:  __x__ Awake   __x___ Alert   _____ Drowsy   _____ Sedated    Nausea/Vomiting:  ____ NO  __x____Yes,   See Post - Op Orders          Pain Scale (0-10):  ___0__    Treatment: ____ None    ___x_ See Post - Op/PCA Orders    Post - Operative Fluids:   ____ Oral   ___x_ See Post - Op Orders    Plan: Discharge:   _x___Home       _____Floor     _____Critical Care    _____  Other:_________________    Comments: Patient had smooth intraoperative course, no event, no anesthesia complications.   PACU vital signs are:  HR: 67       BP:  115  /  77     O2sat: 100   %     RR: 20       Temp:  97.6

## 2018-08-29 NOTE — ASU DISCHARGE PLAN (ADULT/PEDIATRIC). - NOTIFY
Pain not relieved by Medications/Bleeding that does not stop/Persistent Nausea and Vomiting/Excessive Diarrhea/Inability to Tolerate Liquids or Foods/Swelling that continues/Fever greater than 101/Unable to Urinate

## 2018-09-03 DIAGNOSIS — Z91.018 ALLERGY TO OTHER FOODS: ICD-10-CM

## 2018-09-03 DIAGNOSIS — F31.9 BIPOLAR DISORDER, UNSPECIFIED: ICD-10-CM

## 2018-09-03 DIAGNOSIS — R56.9 UNSPECIFIED CONVULSIONS: ICD-10-CM

## 2018-09-03 DIAGNOSIS — Z12.11 ENCOUNTER FOR SCREENING FOR MALIGNANT NEOPLASM OF COLON: ICD-10-CM

## 2018-09-15 ENCOUNTER — EMERGENCY (EMERGENCY)
Facility: HOSPITAL | Age: 53
LOS: 0 days | Discharge: HOME | End: 2018-09-15
Admitting: PHYSICIAN ASSISTANT

## 2018-09-15 VITALS
TEMPERATURE: 98 F | HEIGHT: 66 IN | WEIGHT: 250 LBS | HEART RATE: 100 BPM | RESPIRATION RATE: 18 BRPM | OXYGEN SATURATION: 100 % | SYSTOLIC BLOOD PRESSURE: 113 MMHG | DIASTOLIC BLOOD PRESSURE: 57 MMHG

## 2018-09-15 DIAGNOSIS — Z91.013 ALLERGY TO SEAFOOD: ICD-10-CM

## 2018-09-15 DIAGNOSIS — M54.9 DORSALGIA, UNSPECIFIED: ICD-10-CM

## 2018-09-15 DIAGNOSIS — Z79.899 OTHER LONG TERM (CURRENT) DRUG THERAPY: ICD-10-CM

## 2018-09-15 NOTE — ED ADULT TRIAGE NOTE - CHIEF COMPLAINT QUOTE
Mother dropped off Physician order and parental authorization for medication administration form to be completed per DMM. Placed in blue bin. pt brought in my ems for back pain

## 2018-09-15 NOTE — ED PROVIDER NOTE - PHYSICAL EXAMINATION
Gen: Alert, NAD, well appearing  Head: NC, AT, PERRL, EOMI, normal lids/conjunctiva  ENT: normal hearing, patent oropharynx   Neck: +supple, no tenderness/meningismus,  Pulm: Bilateral BS, normal resp effort, no wheeze/stridor/retractions  CV: RRR, no murmer  Abd: soft, NT/ND  Mskel: no edema/erythema/cyanosis  Skin: no rash, warm/dry  Neuro: AAOx3, no sensory/motor deficits. Normal gait in ED

## 2018-09-15 NOTE — ED ADULT NURSE NOTE - OBJECTIVE STATEMENT
Pt states her friends called the ambulance for her but she is not sure why. Pt denies any pain or discomfort, pt is aox3, calm and cooperative. Pt was evaluated in ED, DC to home.

## 2018-10-04 ENCOUNTER — APPOINTMENT (OUTPATIENT)
Dept: INTERNAL MEDICINE | Facility: CLINIC | Age: 53
End: 2018-10-04

## 2018-10-05 ENCOUNTER — APPOINTMENT (OUTPATIENT)
Dept: GASTROENTEROLOGY | Facility: CLINIC | Age: 53
End: 2018-10-05

## 2018-10-30 ENCOUNTER — OUTPATIENT (OUTPATIENT)
Dept: OUTPATIENT SERVICES | Facility: HOSPITAL | Age: 53
LOS: 1 days | Discharge: HOME | End: 2018-10-30

## 2018-10-30 ENCOUNTER — APPOINTMENT (OUTPATIENT)
Dept: INTERNAL MEDICINE | Facility: CLINIC | Age: 53
End: 2018-10-30

## 2018-10-30 RX ORDER — BISACODYL 5 MG/1
5 TABLET, COATED ORAL
Qty: 4 | Refills: 0 | Status: DISCONTINUED | COMMUNITY
Start: 2017-11-24 | End: 2018-10-30

## 2018-10-30 RX ORDER — POLYETHYLENE GLYCOL 3350 AND ELECTROLYTES WITH LEMON FLAVOR 236; 22.74; 6.74; 5.86; 2.97 G/4L; G/4L; G/4L; G/4L; G/4L
236 POWDER, FOR SOLUTION ORAL
Qty: 1 | Refills: 0 | Status: DISCONTINUED | COMMUNITY
Start: 2017-11-24 | End: 2018-10-30

## 2018-10-30 NOTE — HISTORY OF PRESENT ILLNESS
[FreeTextEntry1] : 52 y/o f is here for the follow up. [de-identified] : 54 y/o f is here for the follow up. Patient was taking valproate 500mg bid and was complaint with that. Inspite of that 2 weeks before\par she developed the seizure.

## 2018-10-30 NOTE — ASSESSMENT
[FreeTextEntry1] : 55 Y/O is here for the follow up.\par \par # seizure\par -Patient was taking 1000mg valproate a day, despite she developed a seizure 2 weeks ago.\par -neurology referral\par -will check valproate blood levels.\par \par #Schizophrenia and bipolar\par -continue the psychiatry medication\par \par #prediabetes\par -diet counselling provided\par \par #hyperlipidemia\par -cardiovascular risk score is 1.7\par -nutritional referral \par \par #hcm\par -colonoscopy refused by patient.\par -FIT ordered\par -mammogram ordered.\par -flu shot given on left deltoid

## 2018-10-31 DIAGNOSIS — E78.5 HYPERLIPIDEMIA, UNSPECIFIED: ICD-10-CM

## 2018-10-31 DIAGNOSIS — R56.9 UNSPECIFIED CONVULSIONS: ICD-10-CM

## 2018-10-31 DIAGNOSIS — F20.9 SCHIZOPHRENIA, UNSPECIFIED: ICD-10-CM

## 2018-10-31 DIAGNOSIS — Z23 ENCOUNTER FOR IMMUNIZATION: ICD-10-CM

## 2018-10-31 LAB
ALBUMIN SERPL ELPH-MCNC: 4.4 G/DL
ALP BLD-CCNC: 89 U/L
ALT SERPL-CCNC: 16 U/L
ANION GAP SERPL CALC-SCNC: 14 MMOL/L
AST SERPL-CCNC: 18 U/L
BILIRUB SERPL-MCNC: 0.3 MG/DL
BUN SERPL-MCNC: 14 MG/DL
CALCIUM SERPL-MCNC: 9.5 MG/DL
CHLORIDE SERPL-SCNC: 107 MMOL/L
CO2 SERPL-SCNC: 25 MMOL/L
CREAT SERPL-MCNC: 0.8 MG/DL
GLUCOSE SERPL-MCNC: 76 MG/DL
POTASSIUM SERPL-SCNC: 4.5 MMOL/L
PROT SERPL-MCNC: 7.5 G/DL
SODIUM SERPL-SCNC: 146 MMOL/L
VALPROATE SERPL-MCNC: 23 UG/ML

## 2018-11-08 ENCOUNTER — EMERGENCY (EMERGENCY)
Facility: HOSPITAL | Age: 53
LOS: 0 days | Discharge: HOME | End: 2018-11-08
Attending: EMERGENCY MEDICINE | Admitting: EMERGENCY MEDICINE

## 2018-11-08 VITALS
HEART RATE: 93 BPM | RESPIRATION RATE: 18 BRPM | SYSTOLIC BLOOD PRESSURE: 126 MMHG | TEMPERATURE: 98 F | DIASTOLIC BLOOD PRESSURE: 79 MMHG | OXYGEN SATURATION: 100 %

## 2018-11-08 DIAGNOSIS — R10.9 UNSPECIFIED ABDOMINAL PAIN: ICD-10-CM

## 2018-11-08 DIAGNOSIS — Z91.018 ALLERGY TO OTHER FOODS: ICD-10-CM

## 2018-11-08 DIAGNOSIS — Z79.899 OTHER LONG TERM (CURRENT) DRUG THERAPY: ICD-10-CM

## 2018-11-08 DIAGNOSIS — R07.81 PLEURODYNIA: ICD-10-CM

## 2018-11-08 LAB
ALBUMIN SERPL ELPH-MCNC: 4.3 G/DL — SIGNIFICANT CHANGE UP (ref 3.5–5.2)
ALP SERPL-CCNC: 100 U/L — SIGNIFICANT CHANGE UP (ref 30–115)
ALT FLD-CCNC: 13 U/L — SIGNIFICANT CHANGE UP (ref 0–41)
ANION GAP SERPL CALC-SCNC: 14 MMOL/L — SIGNIFICANT CHANGE UP (ref 7–14)
AST SERPL-CCNC: 23 U/L — SIGNIFICANT CHANGE UP (ref 0–41)
BASOPHILS # BLD AUTO: 0.01 K/UL — SIGNIFICANT CHANGE UP (ref 0–0.2)
BASOPHILS NFR BLD AUTO: 0.1 % — SIGNIFICANT CHANGE UP (ref 0–1)
BILIRUB SERPL-MCNC: 0.5 MG/DL — SIGNIFICANT CHANGE UP (ref 0.2–1.2)
BUN SERPL-MCNC: 14 MG/DL — SIGNIFICANT CHANGE UP (ref 10–20)
CALCIUM SERPL-MCNC: 9.6 MG/DL — SIGNIFICANT CHANGE UP (ref 8.5–10.1)
CHLORIDE SERPL-SCNC: 101 MMOL/L — SIGNIFICANT CHANGE UP (ref 98–110)
CO2 SERPL-SCNC: 26 MMOL/L — SIGNIFICANT CHANGE UP (ref 17–32)
CREAT SERPL-MCNC: 0.8 MG/DL — SIGNIFICANT CHANGE UP (ref 0.7–1.5)
EOSINOPHIL # BLD AUTO: 0.19 K/UL — SIGNIFICANT CHANGE UP (ref 0–0.7)
EOSINOPHIL NFR BLD AUTO: 2.7 % — SIGNIFICANT CHANGE UP (ref 0–8)
GLUCOSE SERPL-MCNC: 86 MG/DL — SIGNIFICANT CHANGE UP (ref 70–99)
HCT VFR BLD CALC: 39.6 % — SIGNIFICANT CHANGE UP (ref 37–47)
HGB BLD-MCNC: 13 G/DL — SIGNIFICANT CHANGE UP (ref 12–16)
IMM GRANULOCYTES NFR BLD AUTO: 0.1 % — SIGNIFICANT CHANGE UP (ref 0.1–0.3)
LYMPHOCYTES # BLD AUTO: 2.9 K/UL — SIGNIFICANT CHANGE UP (ref 1.2–3.4)
LYMPHOCYTES # BLD AUTO: 41.8 % — SIGNIFICANT CHANGE UP (ref 20.5–51.1)
MCHC RBC-ENTMCNC: 30.7 PG — SIGNIFICANT CHANGE UP (ref 27–31)
MCHC RBC-ENTMCNC: 32.8 G/DL — SIGNIFICANT CHANGE UP (ref 32–37)
MCV RBC AUTO: 93.6 FL — SIGNIFICANT CHANGE UP (ref 81–99)
MONOCYTES # BLD AUTO: 0.63 K/UL — HIGH (ref 0.1–0.6)
MONOCYTES NFR BLD AUTO: 9.1 % — SIGNIFICANT CHANGE UP (ref 1.7–9.3)
NEUTROPHILS # BLD AUTO: 3.2 K/UL — SIGNIFICANT CHANGE UP (ref 1.4–6.5)
NEUTROPHILS NFR BLD AUTO: 46.2 % — SIGNIFICANT CHANGE UP (ref 42.2–75.2)
PLATELET # BLD AUTO: 269 K/UL — SIGNIFICANT CHANGE UP (ref 130–400)
POTASSIUM SERPL-MCNC: 4.3 MMOL/L — SIGNIFICANT CHANGE UP (ref 3.5–5)
POTASSIUM SERPL-SCNC: 4.3 MMOL/L — SIGNIFICANT CHANGE UP (ref 3.5–5)
PROT SERPL-MCNC: 7.7 G/DL — SIGNIFICANT CHANGE UP (ref 6–8)
RBC # BLD: 4.23 M/UL — SIGNIFICANT CHANGE UP (ref 4.2–5.4)
RBC # FLD: 14.6 % — HIGH (ref 11.5–14.5)
SODIUM SERPL-SCNC: 141 MMOL/L — SIGNIFICANT CHANGE UP (ref 135–146)
TROPONIN T SERPL-MCNC: <0.01 NG/ML — SIGNIFICANT CHANGE UP
WBC # BLD: 6.94 K/UL — SIGNIFICANT CHANGE UP (ref 4.8–10.8)
WBC # FLD AUTO: 6.94 K/UL — SIGNIFICANT CHANGE UP (ref 4.8–10.8)

## 2018-11-08 NOTE — ED PROVIDER NOTE - ATTENDING CONTRIBUTION TO CARE
Pt with c/o left, easily reproducible cp that is worse with bending/turning and has been there for 3 days.  Patient to be discharged from ED. Any available test results were discussed with patient. Verbal instructions given, including instructions to return to ED immediately for any new, worsening, or concerning symptoms. Patient endorsed understanding. Written discharge instructions additionally given, including follow-up plan.

## 2018-11-08 NOTE — ED PROVIDER NOTE - CARE PROVIDER_API CALL
Manuel العلي), Cardiovascular Disease; Internal Medicine  [address] [place] [address]  Phone: [phone]  Fax: [phone]

## 2018-11-08 NOTE — ED PROVIDER NOTE - OBJECTIVE STATEMENT
Pt is a 52 y/o female with no PMH who presents to ED for left chest pain, under left breast that started 3 days ago has been constant, not worse with activity. No radiation. No SOB, n/v, diaphoresis, leg pain/swelling. No abd pain. Pt denies cardiac hx. Pt is a 52 y/o female with no PMH who presents to ED for left chest pain, under left breast that started 3 days ago has been constant, not worse with activity. No radiation. No SOB, n/v, diaphoresis, leg pain/swelling. No abd pain. Pt denies cardiac hx.  States moved a mattress several days ago and could have strained a mm.

## 2018-11-08 NOTE — ED PROVIDER NOTE - NSFOLLOWUPINSTRUCTIONS_ED_ALL_ED_FT
Follow-up with a cardiologist and your primary doctor as discussed.    Chest Pain    Chest pain can be caused by many different conditions which may or may not be dangerous. Causes include heartburn, lung infections, heart attack, blood clot in lungs, skin infections, strain or damage to muscle, cartilage, or bones, etc. In addition to a history and physical examination, an electrocardiogram (ECG) or other lab tests may have been performed to determine the cause of your chest pain. Follow up with your primary care provider or with a cardiologist as instructed.     SEEK IMMEDIATE MEDICAL CARE IF YOU HAVE ANY OF THE FOLLOWING SYMPTOMS: worsening chest pain, coughing up blood, unexplained back/neck/jaw pain, severe abdominal pain, dizziness or lightheadedness, fainting, shortness of breath, sweaty or clammy skin, vomiting, or racing heart beat. These symptoms may represent a serious problem that is an emergency. Do not wait to see if the symptoms will go away. Get medical help right away. Call 911 and do not drive yourself to the hospital.

## 2018-11-08 NOTE — ED PROVIDER NOTE - PHYSICAL EXAMINATION
Constitutional: Well developed, well nourished. NAD.  Head: Normocephalic, atraumatic.  Eyes: PERRL. EOMI.  ENT: No nasal discharge. Mucous membranes moist.  Neck: Supple. Painless ROM.  Cardiovascular: Normal S1, S2. Regular rate and rhythm. No murmurs, rubs, or gallops.  Chest: + tenderness to left lower anterior ribs, no rash, erythema.  Pulmonary: Normal respiratory rate and effort. Lungs clear to auscultation bilaterally. No wheezing, rales, or rhonchi.  Abdominal: Soft. Nondistended. Nontender. No rebound, guarding, rigidity.  Extremities. Pelvis stable. No lower extremity edema, symmetric calves.  Skin: No rashes, cyanosis.  Neuro: AAOx3. No focal neurological deficits.  Psych: Normal mood. Normal affect.

## 2018-11-08 NOTE — ED ADULT NURSE NOTE - NSIMPLEMENTINTERV_GEN_ALL_ED
Implemented All Universal Safety Interventions:  [place] to call system. Call bell, personal items and telephone within reach. Instruct patient to call for assistance. Room bathroom lighting operational. Non-slip footwear when patient is off stretcher. Physically safe environment: no spills, clutter or unnecessary equipment. Stretcher in lowest position, wheels locked, appropriate side rails in place.

## 2018-11-12 ENCOUNTER — OUTPATIENT (OUTPATIENT)
Dept: OUTPATIENT SERVICES | Facility: HOSPITAL | Age: 53
LOS: 1 days | Discharge: HOME | End: 2018-11-12

## 2018-11-12 DIAGNOSIS — Z12.31 ENCOUNTER FOR SCREENING MAMMOGRAM FOR MALIGNANT NEOPLASM OF BREAST: ICD-10-CM

## 2018-11-13 ENCOUNTER — APPOINTMENT (OUTPATIENT)
Dept: NUTRITION | Facility: CLINIC | Age: 53
End: 2018-11-13

## 2018-11-13 VITALS — BODY MASS INDEX: 41.29 KG/M2 | HEIGHT: 66 IN | WEIGHT: 256.9 LBS

## 2018-12-04 ENCOUNTER — OUTPATIENT (OUTPATIENT)
Dept: OUTPATIENT SERVICES | Facility: HOSPITAL | Age: 53
LOS: 1 days | Discharge: HOME | End: 2018-12-04

## 2018-12-04 ENCOUNTER — APPOINTMENT (OUTPATIENT)
Dept: NEUROLOGY | Facility: CLINIC | Age: 53
End: 2018-12-04

## 2018-12-04 VITALS
HEART RATE: 114 BPM | BODY MASS INDEX: 40.98 KG/M2 | WEIGHT: 255 LBS | TEMPERATURE: 97 F | DIASTOLIC BLOOD PRESSURE: 90 MMHG | HEIGHT: 66 IN | SYSTOLIC BLOOD PRESSURE: 135 MMHG

## 2019-01-08 ENCOUNTER — APPOINTMENT (OUTPATIENT)
Dept: INTERNAL MEDICINE | Facility: CLINIC | Age: 54
End: 2019-01-08

## 2019-01-08 ENCOUNTER — OUTPATIENT (OUTPATIENT)
Dept: OUTPATIENT SERVICES | Facility: HOSPITAL | Age: 54
LOS: 1 days | Discharge: HOME | End: 2019-01-08

## 2019-01-08 VITALS — DIASTOLIC BLOOD PRESSURE: 70 MMHG | SYSTOLIC BLOOD PRESSURE: 120 MMHG

## 2019-01-08 DIAGNOSIS — Z83.3 FAMILY HISTORY OF DIABETES MELLITUS: ICD-10-CM

## 2019-01-08 NOTE — HEALTH RISK ASSESSMENT
[No falls in past year] : Patient reported no falls in the past year [0] : 2) Feeling down, depressed, or hopeless: Not at all (0) [] : No [de-identified] : No interval visits

## 2019-01-08 NOTE — HISTORY OF PRESENT ILLNESS
[de-identified] : 53 year old female here for follow up.  No seizures since her neuro appointment from December 4th.  \par No complaints today.  Denies chest pain, shortness of breath, headache, dizziness, insomnia, loss of appetite, no fevers, chills, n/v/d, abdominal pain, palpations.

## 2019-01-08 NOTE — PHYSICAL EXAM
[No Acute Distress] : no acute distress [Well Nourished] : well nourished [Well Developed] : well developed [Well-Appearing] : well-appearing [Normal Sclera/Conjunctiva] : normal sclera/conjunctiva [PERRL] : pupils equal round and reactive to light [EOMI] : extraocular movements intact [Normal Outer Ear/Nose] : the outer ears and nose were normal in appearance [Normal Oropharynx] : the oropharynx was normal [No JVD] : no jugular venous distention [Supple] : supple [No Lymphadenopathy] : no lymphadenopathy [No Respiratory Distress] : no respiratory distress  [Clear to Auscultation] : lungs were clear to auscultation bilaterally [No Accessory Muscle Use] : no accessory muscle use [Normal Rate] : normal rate  [Regular Rhythm] : with a regular rhythm [Normal S1, S2] : normal S1 and S2 [No Carotid Bruits] : no carotid bruits [No Edema] : there was no peripheral edema [Soft] : abdomen soft [Non Tender] : non-tender [Non-distended] : non-distended [No Masses] : no abdominal mass palpated [No HSM] : no HSM [Normal Bowel Sounds] : normal bowel sounds [Normal Anterior Cervical Nodes] : no anterior cervical lymphadenopathy [No CVA Tenderness] : no CVA  tenderness [No Spinal Tenderness] : no spinal tenderness [No Joint Swelling] : no joint swelling [Grossly Normal Strength/Tone] : grossly normal strength/tone [No Rash] : no rash [Normal Gait] : normal gait [Coordination Grossly Intact] : coordination grossly intact [No Focal Deficits] : no focal deficits [Normal Affect] : the affect was normal

## 2019-01-08 NOTE — ASSESSMENT
[FreeTextEntry1] : 53 Y/O is here for the follow up.\par \par # Seizure disorder\par - no new seizures since october\par - On keppra and valproic acid - pt follows with neurology. \par - Valproate level from October 2018 is 23.  \par \par #Schizophrenia and bipolar\par -continue the psychiatry medication\par \par #prediabetes\par -A1C 6.1\par -diet counselling provided\par \par # Hyperlipidemia\par - , HDL 62.  ASCVD score is 2.07%.  \par - no indication for statin therapy at this time. \par \par # Morbid Obesity\par -Nutrition appointment on January 15th\par \par #hyperlipidemia\par -cardiovascular risk score is 1.7\par -nutritional referral \par \par #hcm\par -colonoscopy refused by patient. Last colonoscopy from 8/2018 was poor prep. \par -pt has FIT kit at home, has not sent her stool yet.  She was advised to send her stool\par -mammogram Birads 1 from 11/2018\par -flu shot uptodate\par -Pap smear last week 12/11/2018- Dr. Sewell.  Spoke with his office- results are negative\par -repeat labs\par

## 2019-01-10 DIAGNOSIS — R56.9 UNSPECIFIED CONVULSIONS: ICD-10-CM

## 2019-01-10 DIAGNOSIS — F20.9 SCHIZOPHRENIA, UNSPECIFIED: ICD-10-CM

## 2019-01-10 DIAGNOSIS — E78.5 HYPERLIPIDEMIA, UNSPECIFIED: ICD-10-CM

## 2019-01-15 ENCOUNTER — APPOINTMENT (OUTPATIENT)
Dept: NUTRITION | Facility: CLINIC | Age: 54
End: 2019-01-15

## 2019-01-18 ENCOUNTER — APPOINTMENT (OUTPATIENT)
Dept: NUTRITION | Facility: CLINIC | Age: 54
End: 2019-01-18

## 2019-01-18 VITALS — HEIGHT: 65 IN | BODY MASS INDEX: 42.15 KG/M2 | WEIGHT: 253 LBS

## 2019-01-28 ENCOUNTER — EMERGENCY (EMERGENCY)
Facility: HOSPITAL | Age: 54
LOS: 0 days | Discharge: HOME | End: 2019-01-28
Attending: EMERGENCY MEDICINE | Admitting: EMERGENCY MEDICINE

## 2019-01-28 VITALS
OXYGEN SATURATION: 98 % | HEART RATE: 111 BPM | TEMPERATURE: 98 F | SYSTOLIC BLOOD PRESSURE: 151 MMHG | RESPIRATION RATE: 20 BRPM | DIASTOLIC BLOOD PRESSURE: 93 MMHG

## 2019-01-28 DIAGNOSIS — Z79.899 OTHER LONG TERM (CURRENT) DRUG THERAPY: ICD-10-CM

## 2019-01-28 DIAGNOSIS — N76.4 ABSCESS OF VULVA: ICD-10-CM

## 2019-01-28 DIAGNOSIS — Z91.018 ALLERGY TO OTHER FOODS: ICD-10-CM

## 2019-01-28 DIAGNOSIS — L02.91 CUTANEOUS ABSCESS, UNSPECIFIED: ICD-10-CM

## 2019-01-28 NOTE — ED PROVIDER NOTE - NSFOLLOWUPINSTRUCTIONS_ED_ALL_ED_FT
Abscess    An abscess is an infected area that contains a collection of pus and debris. It can occur in almost any part of the body and occurs when the tissue gets infection. Symptoms include a painful mass that is red, warm, tender that might break open and HAVE drainage. If your health care provider gave you antibiotics make sure to take the full course and do not stop even if feeling better.     SEEK IMMEDIATE MEDICAL CARE IF YOU HAVE ANY OF THE FOLLOWING SYMPTOMS: chills, fever, muscle aches, or red streaking from the area.

## 2019-01-28 NOTE — ED PROVIDER NOTE - NS ED ROS FT
GEN:  no fever, no chills  NEURO:  no headache, no dizziness  ENT: no sore throat, no runny nose  CV:  no chest pain, no SOB  RESP:  no dyspnea, no cough  GI:  no nausea, no vomiting, no abdominal pain  :  no dysuria, no hematuria, +vulvar abscess  MSK:  no joint pain, no edema  SKIN:  no rash, no bruising  HEME: no hematochezia, no melena

## 2019-01-28 NOTE — ED PROVIDER NOTE - CARE PROVIDER_API CALL
Oseas Sewell), Obstetrics and Gynecology  [address] [place] [address]  Phone: [phone]  Fax: [phone]

## 2019-01-28 NOTE — ED PROVIDER NOTE - PHYSICAL EXAMINATION
CONSTITUTIONAL: well developed, nontoxic appearing, in no acute distress, speaking in full sentences  SKIN: warm, dry, no rash, cap refill < 2 seconds  HEENT: normocephalic, atraumatic, no conjunctival erythema, moist mucous membranes, patent airway  NECK: supple, no masses  CV:  regular rate, regular rhythm  RESP:  normal work of breathing  ABD: soft, nontender, nondistended, no rebound, no guarding  : 2 cm area of induration and purulent discharge on vulva at 5 o'clock, no vaginal mucosal lesions or tenderness, no blood or discharge in vaginal vault, chaperone Dr. Wilcox present  MSK: normal ROM, no cyanosis, no edema  NEURO: alert, oriented, grossly unremarkable  PSYCH: cooperative, appropriate

## 2019-01-28 NOTE — ED PROVIDER NOTE - ATTENDING CONTRIBUTION TO CARE
Pt is a 52yo female with 4d of L vulva boil.  Swelling.  No fever, no discharge, no vomiting.    Exam: L vulva small boil with mild central fluctuance draining pus, no vaginal tenderness  Imp: abscess  Plan: I&D

## 2019-01-28 NOTE — ED PROVIDER NOTE - PROGRESS NOTE DETAILS
52 yo F presenting with vulvar abscess. No systemic or urinary sx. I&D performed with small amount bloody fluid and minimal purulent dc. Pt tolerated procedure well. Instructed to continue warm compresses and to f/u with OBCYNTHIA Sewell.

## 2019-01-28 NOTE — ED ADULT NURSE NOTE - OBJECTIVE STATEMENT
pt presents to ED c/o abscess to vuvula x4 days with slight drainage. Pt denies any fevers or chills.

## 2019-02-23 ENCOUNTER — EMERGENCY (EMERGENCY)
Facility: HOSPITAL | Age: 54
LOS: 0 days | Discharge: HOME | End: 2019-02-23
Admitting: PHYSICIAN ASSISTANT

## 2019-02-23 VITALS
HEART RATE: 115 BPM | TEMPERATURE: 97 F | DIASTOLIC BLOOD PRESSURE: 76 MMHG | RESPIRATION RATE: 18 BRPM | OXYGEN SATURATION: 98 % | SYSTOLIC BLOOD PRESSURE: 132 MMHG

## 2019-02-23 VITALS — HEART RATE: 98 BPM

## 2019-02-23 DIAGNOSIS — N89.8 OTHER SPECIFIED NONINFLAMMATORY DISORDERS OF VAGINA: ICD-10-CM

## 2019-02-23 DIAGNOSIS — Z91.018 ALLERGY TO OTHER FOODS: ICD-10-CM

## 2019-02-23 DIAGNOSIS — F17.200 NICOTINE DEPENDENCE, UNSPECIFIED, UNCOMPLICATED: ICD-10-CM

## 2019-02-23 DIAGNOSIS — Z79.899 OTHER LONG TERM (CURRENT) DRUG THERAPY: ICD-10-CM

## 2019-02-23 NOTE — ED PROVIDER NOTE - PROGRESS NOTE DETAILS
less than one cm cystic structure to left upper labia majora no purulent material expressed, not warm, follow up with gyn discussed as an outpatient

## 2019-02-23 NOTE — ED PROVIDER NOTE - CARE PROVIDER_API CALL
Oseas Sewell)  Obstetrics and Gynecology  [address] [place] [address]  Phone: [phone]  Fax: [phone]  Follow Up Time: 4-6 Days

## 2019-02-23 NOTE — ED PROVIDER NOTE - NS ED ROS FT
Review of Systems    Constitutional: (-) fever  Cardiovascular: (-) chest pain, (-) syncope  Respiratory: (-) cough, (-) shortness of breath  Gastrointestinal: (-) vomiting, (-) diarrhea  Musculoskeletal: (-) neck pain, (-) back pain, (-) joint pain  Integumentary: (-) rash  Neurological: (-) headache

## 2019-02-23 NOTE — ED PROVIDER NOTE - OBJECTIVE STATEMENT
Patient is a 54 years old F presents to the ED for evaluation of cyst to vaginal area.  Sts similar cyst one month ago that required drainage, no pururlent material noted no fever, sts has appointment with Dr Sewell gyn as an outpatient in 2 days. no abd pain, n nausea, vomiting, diarrhea.

## 2019-02-23 NOTE — ED PROVIDER NOTE - PHYSICAL EXAMINATION
Gen: Alert, NAD, well appearing  Head: NC, AT, PERRL, EOMI, normal lids/conjunctiva  Pulm: Bilateral BS, normal resp effort, no wheeze/stridor/retractions  CV: RRR  Abd: soft, NT/ND  GYN: (Chaperone by REYMUNDO Lundberg) Normal female genitalia, less than one cm cyst to left labia majora upper section non fluctuant minimally tender  Neuro: AAOx3

## 2019-02-25 ENCOUNTER — OUTPATIENT (OUTPATIENT)
Dept: OUTPATIENT SERVICES | Facility: HOSPITAL | Age: 54
LOS: 1 days | Discharge: HOME | End: 2019-02-25

## 2019-02-25 DIAGNOSIS — D64.9 ANEMIA, UNSPECIFIED: ICD-10-CM

## 2019-02-25 DIAGNOSIS — E11.9 TYPE 2 DIABETES MELLITUS WITHOUT COMPLICATIONS: ICD-10-CM

## 2019-02-25 DIAGNOSIS — I11.9 HYPERTENSIVE HEART DISEASE WITHOUT HEART FAILURE: ICD-10-CM

## 2019-02-25 DIAGNOSIS — E07.9 DISORDER OF THYROID, UNSPECIFIED: ICD-10-CM

## 2019-03-06 ENCOUNTER — APPOINTMENT (OUTPATIENT)
Dept: OBGYN | Facility: CLINIC | Age: 54
End: 2019-03-06

## 2019-03-06 ENCOUNTER — OUTPATIENT (OUTPATIENT)
Dept: OUTPATIENT SERVICES | Facility: HOSPITAL | Age: 54
LOS: 1 days | Discharge: HOME | End: 2019-03-06

## 2019-03-06 VITALS
HEIGHT: 65 IN | WEIGHT: 261.31 LBS | SYSTOLIC BLOOD PRESSURE: 130 MMHG | DIASTOLIC BLOOD PRESSURE: 90 MMHG | BODY MASS INDEX: 43.54 KG/M2

## 2019-03-06 NOTE — PHYSICAL EXAM
[Awake] : awake [Alert] : alert [Soft] : soft [Oriented x3] : oriented to person, place, and time [Normal] : vagina [No Bleeding] : there was no active vaginal bleeding [Absent] : absent [Acute Distress] : no acute distress [Mass] : no breast mass [Nipple Discharge] : no nipple discharge [Axillary LAD] : no axillary lymphadenopathy [Tender] : non tender [Adnexa Tenderness] : were not tender [FreeTextEntry4] : Small subcentimeter cyst on the left labia that is not painful.

## 2019-03-08 DIAGNOSIS — Z01.419 ENCOUNTER FOR GYNECOLOGICAL EXAMINATION (GENERAL) (ROUTINE) WITHOUT ABNORMAL FINDINGS: ICD-10-CM

## 2019-03-12 ENCOUNTER — APPOINTMENT (OUTPATIENT)
Dept: NEUROLOGY | Facility: CLINIC | Age: 54
End: 2019-03-12

## 2019-04-06 ENCOUNTER — EMERGENCY (EMERGENCY)
Facility: HOSPITAL | Age: 54
LOS: 0 days | Discharge: HOME | End: 2019-04-06
Admitting: PHYSICIAN ASSISTANT
Payer: MEDICARE

## 2019-04-06 VITALS
RESPIRATION RATE: 20 BRPM | SYSTOLIC BLOOD PRESSURE: 142 MMHG | TEMPERATURE: 98 F | HEART RATE: 115 BPM | DIASTOLIC BLOOD PRESSURE: 92 MMHG | OXYGEN SATURATION: 99 %

## 2019-04-06 VITALS — HEART RATE: 99 BPM

## 2019-04-06 DIAGNOSIS — L02.91 CUTANEOUS ABSCESS, UNSPECIFIED: ICD-10-CM

## 2019-04-06 DIAGNOSIS — Z79.899 OTHER LONG TERM (CURRENT) DRUG THERAPY: ICD-10-CM

## 2019-04-06 DIAGNOSIS — Z79.2 LONG TERM (CURRENT) USE OF ANTIBIOTICS: ICD-10-CM

## 2019-04-06 DIAGNOSIS — Z91.018 ALLERGY TO OTHER FOODS: ICD-10-CM

## 2019-04-06 DIAGNOSIS — N61.1 ABSCESS OF THE BREAST AND NIPPLE: ICD-10-CM

## 2019-04-06 PROCEDURE — 99283 EMERGENCY DEPT VISIT LOW MDM: CPT

## 2019-04-06 RX ORDER — CEPHALEXIN 500 MG
500 CAPSULE ORAL ONCE
Qty: 0 | Refills: 0 | Status: COMPLETED | OUTPATIENT
Start: 2019-04-06 | End: 2019-04-06

## 2019-04-06 RX ORDER — CEPHALEXIN 500 MG
1 CAPSULE ORAL
Qty: 40 | Refills: 0
Start: 2019-04-06 | End: 2019-04-15

## 2019-04-06 RX ORDER — AZTREONAM 2 G
1 VIAL (EA) INJECTION
Qty: 20 | Refills: 0
Start: 2019-04-06 | End: 2019-04-15

## 2019-04-06 RX ADMIN — Medication 500 MILLIGRAM(S): at 20:28

## 2019-04-06 RX ADMIN — Medication 1 TABLET(S): at 20:28

## 2019-04-06 NOTE — ED PROVIDER NOTE - OBJECTIVE STATEMENT
55 yo F c/o lump to left breast x 2 days. +painful when touched. ?abscess. No fevers. Patient has previously had abscesses to other parts of her body. Patient's last mammo was last year. No family hx of breast ca.

## 2019-04-06 NOTE — ED PROVIDER NOTE - PHYSICAL EXAMINATION
Physical Exam    Vital Signs: I have reviewed the initial vital signs.  Constitutional: well-nourished, appears stated age, no acute distress  Skin: + tender, indurated area (8 o'clock) noted  left lower breast consistent with early abscess. No other masses appreciated. No lymphadenopathy  Neuro: AOx3, No focal deficits noted

## 2019-04-06 NOTE — ED PROVIDER NOTE - CARE PROVIDERS DIRECT ADDRESSES
[email or web address]

## 2019-04-06 NOTE — ED ADULT NURSE NOTE - OBJECTIVE STATEMENT
patient reports to the ED with a complaint of a painful lump to left breast for 2 days. denies any fever or chills

## 2019-04-06 NOTE — ED PROVIDER NOTE - PROVIDER TOKENS
PROVIDER:[TOKEN:[[id number]],FOLLOWUP:[1-3 Days]],FREE:[LAST:[your PMD 2 days],PHONE:[(   )    -],FAX:[(   )    -]]

## 2019-04-06 NOTE — ED PROVIDER NOTE - NSFOLLOWUPCLINICS_GEN_ALL_ED_FT
[facility]  Breast  [address], [address]  [place] [address]  Phone: [phone]  Fax:   Follow Up Time: 1-3 Days

## 2019-04-06 NOTE — ED PROVIDER NOTE - CARE PROVIDER_API CALL
Jim Porter)  Surgery  [facility] [address]nd [place] [address]  Phone: [phone]  Fax: [phone]  Follow Up Time: 1-3 Days    your PMD 2 days,   Phone: (   )    -  Fax: (   )    -  Follow Up Time:

## 2019-04-12 ENCOUNTER — OUTPATIENT (OUTPATIENT)
Dept: OUTPATIENT SERVICES | Facility: HOSPITAL | Age: 54
LOS: 1 days | Discharge: HOME | End: 2019-04-12

## 2019-04-12 ENCOUNTER — APPOINTMENT (OUTPATIENT)
Dept: BREAST CENTER | Facility: CLINIC | Age: 54
End: 2019-04-12
Payer: MEDICARE

## 2019-04-12 VITALS
HEIGHT: 66 IN | WEIGHT: 260 LBS | BODY MASS INDEX: 41.78 KG/M2 | TEMPERATURE: 97.8 F | SYSTOLIC BLOOD PRESSURE: 126 MMHG | DIASTOLIC BLOOD PRESSURE: 86 MMHG

## 2019-04-12 DIAGNOSIS — L72.3 SEBACEOUS CYST: ICD-10-CM

## 2019-04-12 PROCEDURE — 10060 I&D ABSCESS SIMPLE/SINGLE: CPT

## 2019-04-16 ENCOUNTER — OUTPATIENT (OUTPATIENT)
Dept: OUTPATIENT SERVICES | Facility: HOSPITAL | Age: 54
LOS: 1 days | Discharge: HOME | End: 2019-04-16

## 2019-04-16 ENCOUNTER — RX RENEWAL (OUTPATIENT)
Age: 54
End: 2019-04-16

## 2019-04-16 ENCOUNTER — APPOINTMENT (OUTPATIENT)
Dept: INTERNAL MEDICINE | Facility: CLINIC | Age: 54
End: 2019-04-16

## 2019-04-16 VITALS
HEART RATE: 91 BPM | BODY MASS INDEX: 41.78 KG/M2 | WEIGHT: 260 LBS | HEIGHT: 66 IN | TEMPERATURE: 97.9 F | DIASTOLIC BLOOD PRESSURE: 79 MMHG | SYSTOLIC BLOOD PRESSURE: 135 MMHG

## 2019-04-18 NOTE — HISTORY OF PRESENT ILLNESS
[de-identified] : 54 year old female here for follow up. She had an episode of seizure April 3rd and went to [facility] and discharged the same day. She was compliant with her medication. Patient was suppose to be on Keppra and Divalproex but she was not taking Divalproex for the past 4 months. \par She also saw surgeon for left inferior chest wall sebaceous cyst s/p I&D and on Bactrim.\par No complaints today. Denies chest pain, shortness of breath, headache, dizziness, insomnia, loss of appetite, no fevers, chills, n/v/d, abdominal pain, palpations.

## 2019-04-18 NOTE — PHYSICAL EXAM
[No Acute Distress] : no acute distress [Well Developed] : well developed [Well-Appearing] : well-appearing [Well Nourished] : well nourished [PERRL] : pupils equal round and reactive to light [Normal Sclera/Conjunctiva] : normal sclera/conjunctiva [EOMI] : extraocular movements intact [Normal Oropharynx] : the oropharynx was normal [Normal Outer Ear/Nose] : the outer ears and nose were normal in appearance [No JVD] : no jugular venous distention [Supple] : supple [No Lymphadenopathy] : no lymphadenopathy [Thyroid Normal, No Nodules] : the thyroid was normal and there were no nodules present [Clear to Auscultation] : lungs were clear to auscultation bilaterally [No Respiratory Distress] : no respiratory distress  [Regular Rhythm] : with a regular rhythm [No Accessory Muscle Use] : no accessory muscle use [Normal Rate] : normal rate  [Normal S1, S2] : normal S1 and S2 [No Murmur] : no murmur heard [No Carotid Bruits] : no carotid bruits [No Varicosities] : no varicosities [No Abdominal Bruit] : a ~M bruit was not heard ~T in the abdomen [No Edema] : there was no peripheral edema [Pedal Pulses Present] : the pedal pulses are present [No Extremity Clubbing/Cyanosis] : no extremity clubbing/cyanosis [No Palpable Aorta] : no palpable aorta [Non Tender] : non-tender [Non-distended] : non-distended [Soft] : abdomen soft [No Masses] : no abdominal mass palpated [No HSM] : no HSM [Normal Bowel Sounds] : normal bowel sounds [Normal Anterior Cervical Nodes] : no anterior cervical lymphadenopathy [Normal Posterior Cervical Nodes] : no posterior cervical lymphadenopathy [No CVA Tenderness] : no CVA  tenderness [No Spinal Tenderness] : no spinal tenderness [No Joint Swelling] : no joint swelling [Grossly Normal Strength/Tone] : grossly normal strength/tone [No Rash] : no rash [Normal Gait] : normal gait [Coordination Grossly Intact] : coordination grossly intact [No Focal Deficits] : no focal deficits [Deep Tendon Reflexes (DTR)] : deep tendon reflexes were 2+ and symmetric [Normal Affect] : the affect was normal [Normal Insight/Judgement] : insight and judgment were intact [de-identified] : left inf chest wall sebaceous cyst , no drainage

## 2019-04-18 NOTE — ASSESSMENT
[FreeTextEntry1] : 55 Y/O is here for the follow up.\par \par # Seizure disorder\par - Last seizure on April 3rd 2019\par - On keppra\par - She was not taking Divalproex for the past 4 months\par - will re-start the Divalproex\par - Valproate level from October 2018 is 23. \par - neurology f/u\par \par #Schizophrenia and bipolar\par -continue with Olanzapine \par \par #prediabetes\par -A1C 6.1\par -diet counselling provided\par \par # Hyperlipidemia\par - , HDL 62. ASCVD score is 2.07%. \par - no indication for statin therapy at this time. \par \par # Morbid Obesity\par -Already saw nutritionist \par \par #Sebaceous cyst s/p drainage:\par -c/w Bactrim\par - f/u surgery\par \par #HCM\par -colonoscopy refused by patient. Last colonoscopy from 8/2018 was poor prep. \par - FIT test negative negative \par -mammogram Birads 1 from 11/2018--> she has prescription for mammogram this year \par -flu shot uptodate\par -Pap smear last week 12/11/2018- Dr. Sewell. Spoke with his office- results are negative\par - f/u in 6 moths and lab before coming \par

## 2019-04-19 ENCOUNTER — APPOINTMENT (OUTPATIENT)
Dept: BREAST CENTER | Facility: CLINIC | Age: 54
End: 2019-04-19
Payer: MEDICARE

## 2019-04-19 VITALS
WEIGHT: 260 LBS | SYSTOLIC BLOOD PRESSURE: 128 MMHG | BODY MASS INDEX: 41.78 KG/M2 | HEIGHT: 66 IN | DIASTOLIC BLOOD PRESSURE: 84 MMHG | TEMPERATURE: 97.9 F

## 2019-04-19 DIAGNOSIS — E78.5 HYPERLIPIDEMIA, UNSPECIFIED: ICD-10-CM

## 2019-04-19 DIAGNOSIS — E66.01 MORBID (SEVERE) OBESITY DUE TO EXCESS CALORIES: ICD-10-CM

## 2019-04-19 DIAGNOSIS — G40.909 EPILEPSY, UNSPECIFIED, NOT INTRACTABLE, WITHOUT STATUS EPILEPTICUS: ICD-10-CM

## 2019-04-19 PROCEDURE — 99024 POSTOP FOLLOW-UP VISIT: CPT

## 2019-04-19 NOTE — REVIEW OF SYSTEMS
[As Noted in HPI] : as noted in HPI [Skin Lesions] : skin lesion [Skin Wound] : skin wound [Hot Flashes] : hot flashes [Negative] : Heme/Lymph [Fever] : no fever [Chills] : no chills [Recent Weight Gain (___ Lbs)] : no recent weight gain [Recent Weight Loss (___ Lbs)] : no recent weight loss [Breast Lump] : no breast lump [Breast Pain] : no breast pain

## 2019-04-19 NOTE — DATA REVIEWED
[FreeTextEntry1] : \par 4/12/19 -- I and D of infected sebaceous cyst \par -citrobacter koseri, resistant to ampicillin

## 2019-04-19 NOTE — HISTORY OF PRESENT ILLNESS
[FreeTextEntry1] : Rosa is a 54 F with an infected sebaceous cyst in her medial inferior inframammary fold, s/p I and D on 19. \par \par She started to have symptoms 6 days prior.  She went to the ED and was given Keflex.  She had spontaneous drainage of her abscess since starting the antibiotics, but it is still painful.  She denies any fevers or chills and has not had a similar episode in this area before. \par \par She has no breast related complaints at this time.  She denies any breast pain, has not palpated any new palpable masses in either breast and denies any nipple discharge or retraction.  Her most recent imaging was performed on 18, a b/l mammogram, which was unrevealing for any suspicious lesions in either breast.\par \par 19 -- I and D of infected sebaceous cyst \par -citrobacter koseri, resistant to ampicillin \par \par HISTORICAL RISK FACTORS: \par -no prior breast biopsy or surgery \par -no family history of breast or ovarian cancer\par -\par -no prior OCP use \par \par INTERVAL HISTORY:\par Rosa returns for a 1 week follow up visit, s/p I and D of infected sebaceous cyst.  Her cultures grew out citrobacter koseri which was only resistant to ampicillin but sensitive to bactrim and augmentin. \par \par She is feeling much better since having the I and D performed.  Her incision has since closed and she has not had any pain or drainage around the wound since then.  She denies any fevers or chills and is tolerating the bactrim well.  \par

## 2019-04-19 NOTE — PHYSICAL EXAM
[Normocephalic] : normocephalic [Atraumatic] : atraumatic [EOMI] : extra ocular movement intact [No Supraclavicular Adenopathy] : no supraclavicular adenopathy [No Cervical Adenopathy] : no cervical adenopathy [Examined in the supine and seated position] : examined in the supine and seated position [No dominant masses] : no dominant masses in right breast  [No dominant masses] : no dominant masses left breast [No Nipple Retraction] : no left nipple retraction [No Nipple Discharge] : no left nipple discharge [No Axillary Lymphadenopathy] : no left axillary lymphadenopathy [Soft] : abdomen soft [Not Tender] : non-tender [No Edema] : no edema [No Rashes] : no rashes [No Ulceration] : no ulceration [de-identified] : 2 x 2 cm sebaceous cyst without any overlying erythema or induration at this time.

## 2019-04-19 NOTE — PAST MEDICAL HISTORY
[Perimenopausal] : The patient is perimenopausal [Menarche Age ____] : age at menarche was [unfilled] [Total Preg ___] : G[unfilled] [History of Hormone Replacement Treatment] : has no history of hormone replacement treatment [FreeTextEntry5] : denies  [FreeTextEntry8] : n/a [FreeTextEntry7] : denies [FreeTextEntry6] : denies

## 2019-04-19 NOTE — ASSESSMENT
[FreeTextEntry1] : Rosa is a 54 F with a left chest wall infected sebaceous cyst, s/p I and D on 4/12/19. \par \par On exam, her sebaceous cyst is still present but does not appear infected.  It is no longer erythematous or indurated.  She had a b/l mammogram on 11/12/18 which was unrevealing for any suspicious abnormality, deemed BIRADS 1. She will be due for a b/l screening mammogram on 11/12/19.  This will be scheduled for her today.\par \par -switched her Keflex antibiotics to Bactrim x 1 week to cover for possible MRSA infection, allergic to penicillin --> she still has one more day left of Bactrim and should finish the 1 week course. \par \par Her cultures grew out citrobacter koseri which was sensitive to Bactrim.  \par \par Epidermal inclusion cysts can occur anywhere on the skin and benign in nature.  They can get inflamed and infected.  The reason for surgical excision is related to symptomatology.  Per Iris, this is the first time she got an infection in that area.  She is not interested in surgical excision of this area.   \par \par She is otherwise at an average risk for breast cancer and should continue with annual screening mammograms, next due on 11/12/19.  \par \par All of her questions were answered. She knows to call with any further questions or concerns. \par \par PLAN: \par -b/l screening mammogram on 11/12/19 \par -f/up after

## 2019-04-22 ENCOUNTER — APPOINTMENT (OUTPATIENT)
Dept: NUTRITION | Facility: CLINIC | Age: 54
End: 2019-04-22

## 2019-04-22 ENCOUNTER — OTHER (OUTPATIENT)
Age: 54
End: 2019-04-22

## 2019-05-08 NOTE — ASSESSMENT
[FreeTextEntry1] : Rosa is a 54 F with a left chest wall infected sebaceous cyst, s/p I and D on 4/12/19. \par \par There was a 2 x 2 cm inflamed and indurated sebaceous cyst just medial to her inframammary fold on her left chest wall.  She otherwise did not have any other breast lesions palpated.  She had a b/l mammogram on 11/12/18 which was unrevealing for any suspicious abnormality, deemed BIRADS 1. \par \par Wound care instructions: \par -daily packing changes with 1/4 inch packing, dry dressing on top \par -warm compresses \par -switched her Keflex antibiotics to Bactrim x 1 week to cover for possible MRSA infection, allergic to penicillin\par \par Epidermal inclusion cysts can occur anywhere on the skin and benign in nature.  They can get inflamed and infected.  The reason for surgical excision is related to symptomatology.  Per Iris, this is the first time she got an infection in that area.  After resolution of this acute infection, we will re-discuss the possibility of surgical excision to prevent recurrence. \par \par She is otherwise at an average risk for breast cancer and should continue with annual screening mammograms, next due on 11/12/19.  \par \par All of her questions were answered. She knows to call with any further questions or concerns. \par \par PLAN: \par -bactrim x 1 week \par -daily dressing changes with 1/4 inch packing \par -f/up in 1 week for a  wound check

## 2019-05-08 NOTE — DATA REVIEWED
[FreeTextEntry1] : EXAM: MG MAMMO SCREEN W MARIA E BI# \par \par \par PROCEDURE DATE: 11/12/2018 \par \par \par \par INTERPRETATION: HISTORY: \par Patient is 53 years old and is seen for screening. The patient has no \par personal history of cancer. The patient has no family history of breast \par cancer. \par \par CLINICAL BREAST EXAM: \par The patient reports her last clinical breast exam was performed within the \par past month. \par \par FILMS COMPARED: \par The present examination has been compared to prior imaging studies performed \par at [facility] on 10/07/2010, 10/12/2011, 09/07/2016 \par and 09/11/2017. \par \par MAMMOGRAM FINDINGS: \par The following mammographic views were obtained: bilateral craniocaudal with \par tomosynthesis, bilateral mediolateral oblique with tomosynthesis. \par Computer-aided detection was utilized in the interpretation of this \par examination. \par \par There are scattered areas of fibroglandular density. \par \par No suspicious masses, calcifications or other abnormalities are seen. \par \par IMPRESSION: \par There is no mammographic evidence of malignancy. \par \par RECOMMENDATION: \par Unless otherwise indicated by clinical findings, annual screening \par mammography recommended. \par \par ASSESSMENT: \par BI-RADS Category 1: Negative \par \par \par \par \par \par \par \par \par JÚNIOR ROMEO \par This document has been electronically signed. Nov 13 2018 9:00AM \par \par \par  Normal

## 2019-05-08 NOTE — REVIEW OF SYSTEMS
[As Noted in HPI] : as noted in HPI [Skin Wound] : skin wound [Skin Lesions] : skin lesion [Negative] : Heme/Lymph [Hot Flashes] : hot flashes [Fever] : no fever [Chills] : no chills [Recent Weight Gain (___ Lbs)] : no recent weight gain [Recent Weight Loss (___ Lbs)] : no recent weight loss [Breast Pain] : no breast pain [Breast Lump] : no breast lump

## 2019-05-08 NOTE — PROCEDURE
[___ ml Inj] : [unfilled] ~Uml [1%] : 1% [Without Epi] : without epinephrine [Betadine] : using betadine [FreeTextEntry1] : left inferior chest wall infected sebaceous cyst I and D  [FreeTextEntry8] : left chest wall, just medial to her inframammary fold on the left side

## 2019-05-08 NOTE — CONSULT LETTER
[Consult Letter:] : I had the pleasure of evaluating your patient, [unfilled]. [Dear  ___] : Dear  [unfilled], [Please see my note below.] : Please see my note below. [Consult Closing:] : Thank you very much for allowing me to participate in the care of this patient.  If you have any questions, please do not hesitate to contact me. [Sincerely,] : Sincerely, [FreeTextEntry2] : Jim Cottrell MD\par [address] [place] [address]\[place] [id number] [FreeTextEntry3] : Erin Borjas MD \par Breast Surgical Oncologist\par [facility] [place]\par [facility]\par [facility]

## 2019-05-08 NOTE — HISTORY OF PRESENT ILLNESS
[FreeTextEntry1] : Iris is a 54 F with an infected sebaceous cyst in her medial inferior inframammary fold.  \par \par She started to have symptoms 6 days prior.  She went to the ED and was given Keflex.  She had spontaneous drainage of her abscess since starting the antibiotics, but it is still painful.  She denies any fevers or chills and has not had a similar episode in this area before. \par \par She has no breast related complaints at this time.  She denies any breast pain, has not palpated any new palpable masses in either breast and denies any nipple discharge or retraction.  Her most recent imaging was performed on 18, a b/l mammogram, which was unrevealing for any suspicious lesions in either breast.\par \par HISTORICAL RISK FACTORS: \par -no prior breast biopsy or surgery \par -no family history of breast or ovarian cancer\par -\par -no prior OCP use

## 2019-05-08 NOTE — PAST MEDICAL HISTORY
[Perimenopausal] : The patient is perimenopausal [Menarche Age ____] : age at menarche was [unfilled] [Total Preg ___] : G[unfilled] [History of Hormone Replacement Treatment] : has no history of hormone replacement treatment [FreeTextEntry5] : denies  [FreeTextEntry6] : denies [FreeTextEntry7] : denies [FreeTextEntry8] : n/a

## 2019-05-30 ENCOUNTER — INPATIENT (INPATIENT)
Facility: HOSPITAL | Age: 54
LOS: 1 days | Discharge: HOME | End: 2019-06-01
Attending: INTERNAL MEDICINE | Admitting: INTERNAL MEDICINE
Payer: MEDICARE

## 2019-05-30 VITALS
SYSTOLIC BLOOD PRESSURE: 132 MMHG | DIASTOLIC BLOOD PRESSURE: 97 MMHG | HEART RATE: 112 BPM | TEMPERATURE: 98 F | OXYGEN SATURATION: 98 % | RESPIRATION RATE: 18 BRPM

## 2019-05-30 LAB
ALBUMIN SERPL ELPH-MCNC: 4.5 G/DL — SIGNIFICANT CHANGE UP (ref 3.5–5.2)
ALP SERPL-CCNC: 90 U/L — SIGNIFICANT CHANGE UP (ref 30–115)
ALT FLD-CCNC: 15 U/L — SIGNIFICANT CHANGE UP (ref 0–41)
ANION GAP SERPL CALC-SCNC: 16 MMOL/L — HIGH (ref 7–14)
AST SERPL-CCNC: 29 U/L — SIGNIFICANT CHANGE UP (ref 0–41)
BASOPHILS # BLD AUTO: 0.01 K/UL — SIGNIFICANT CHANGE UP (ref 0–0.2)
BASOPHILS NFR BLD AUTO: 0.1 % — SIGNIFICANT CHANGE UP (ref 0–1)
BILIRUB SERPL-MCNC: 0.4 MG/DL — SIGNIFICANT CHANGE UP (ref 0.2–1.2)
BUN SERPL-MCNC: 17 MG/DL — SIGNIFICANT CHANGE UP (ref 10–20)
CALCIUM SERPL-MCNC: 9.8 MG/DL — SIGNIFICANT CHANGE UP (ref 8.5–10.1)
CHLORIDE SERPL-SCNC: 104 MMOL/L — SIGNIFICANT CHANGE UP (ref 98–110)
CO2 SERPL-SCNC: 23 MMOL/L — SIGNIFICANT CHANGE UP (ref 17–32)
CREAT SERPL-MCNC: 0.8 MG/DL — SIGNIFICANT CHANGE UP (ref 0.7–1.5)
EOSINOPHIL # BLD AUTO: 0.04 K/UL — SIGNIFICANT CHANGE UP (ref 0–0.7)
EOSINOPHIL NFR BLD AUTO: 0.4 % — SIGNIFICANT CHANGE UP (ref 0–8)
GLUCOSE SERPL-MCNC: 86 MG/DL — SIGNIFICANT CHANGE UP (ref 70–99)
HCT VFR BLD CALC: 42 % — SIGNIFICANT CHANGE UP (ref 37–47)
HGB BLD-MCNC: 13.7 G/DL — SIGNIFICANT CHANGE UP (ref 12–16)
IMM GRANULOCYTES NFR BLD AUTO: 0.6 % — HIGH (ref 0.1–0.3)
LYMPHOCYTES # BLD AUTO: 2.77 K/UL — SIGNIFICANT CHANGE UP (ref 1.2–3.4)
LYMPHOCYTES # BLD AUTO: 27 % — SIGNIFICANT CHANGE UP (ref 20.5–51.1)
MCHC RBC-ENTMCNC: 31 PG — SIGNIFICANT CHANGE UP (ref 27–31)
MCHC RBC-ENTMCNC: 32.6 G/DL — SIGNIFICANT CHANGE UP (ref 32–37)
MCV RBC AUTO: 95 FL — SIGNIFICANT CHANGE UP (ref 81–99)
MONOCYTES # BLD AUTO: 0.84 K/UL — HIGH (ref 0.1–0.6)
MONOCYTES NFR BLD AUTO: 8.2 % — SIGNIFICANT CHANGE UP (ref 1.7–9.3)
NEUTROPHILS # BLD AUTO: 6.54 K/UL — HIGH (ref 1.4–6.5)
NEUTROPHILS NFR BLD AUTO: 63.7 % — SIGNIFICANT CHANGE UP (ref 42.2–75.2)
NRBC # BLD: 0 /100 WBCS — SIGNIFICANT CHANGE UP (ref 0–0)
PLATELET # BLD AUTO: 262 K/UL — SIGNIFICANT CHANGE UP (ref 130–400)
POTASSIUM SERPL-MCNC: 4.4 MMOL/L — SIGNIFICANT CHANGE UP (ref 3.5–5)
POTASSIUM SERPL-SCNC: 4.4 MMOL/L — SIGNIFICANT CHANGE UP (ref 3.5–5)
PROT SERPL-MCNC: 7.8 G/DL — SIGNIFICANT CHANGE UP (ref 6–8)
RBC # BLD: 4.42 M/UL — SIGNIFICANT CHANGE UP (ref 4.2–5.4)
RBC # FLD: 14.6 % — HIGH (ref 11.5–14.5)
SODIUM SERPL-SCNC: 143 MMOL/L — SIGNIFICANT CHANGE UP (ref 135–146)
TROPONIN T SERPL-MCNC: <0.01 NG/ML — SIGNIFICANT CHANGE UP
VALPROATE SERPL-MCNC: 29 UG/ML — LOW (ref 50–100)
WBC # BLD: 10.26 K/UL — SIGNIFICANT CHANGE UP (ref 4.8–10.8)
WBC # FLD AUTO: 10.26 K/UL — SIGNIFICANT CHANGE UP (ref 4.8–10.8)

## 2019-05-30 PROCEDURE — 73060 X-RAY EXAM OF HUMERUS: CPT | Mod: 26,LT

## 2019-05-30 PROCEDURE — 73090 X-RAY EXAM OF FOREARM: CPT | Mod: 26,LT

## 2019-05-30 PROCEDURE — 73130 X-RAY EXAM OF HAND: CPT | Mod: 26,LT

## 2019-05-30 PROCEDURE — 73030 X-RAY EXAM OF SHOULDER: CPT | Mod: 26,LT

## 2019-05-30 PROCEDURE — 73080 X-RAY EXAM OF ELBOW: CPT | Mod: 26,LT

## 2019-05-30 PROCEDURE — 73110 X-RAY EXAM OF WRIST: CPT | Mod: 26,LT

## 2019-05-30 PROCEDURE — 70551 MRI BRAIN STEM W/O DYE: CPT | Mod: 26

## 2019-05-30 PROCEDURE — 99285 EMERGENCY DEPT VISIT HI MDM: CPT

## 2019-05-30 PROCEDURE — 70450 CT HEAD/BRAIN W/O DYE: CPT | Mod: 26

## 2019-05-30 PROCEDURE — 73090 X-RAY EXAM OF FOREARM: CPT | Mod: 26,LT,77

## 2019-05-30 PROCEDURE — 93010 ELECTROCARDIOGRAM REPORT: CPT

## 2019-05-30 RX ORDER — LEVETIRACETAM 250 MG/1
500 TABLET, FILM COATED ORAL
Refills: 0 | Status: DISCONTINUED | OUTPATIENT
Start: 2019-05-30 | End: 2019-05-31

## 2019-05-30 RX ORDER — DIVALPROEX SODIUM 500 MG/1
500 TABLET, DELAYED RELEASE ORAL EVERY 12 HOURS
Refills: 0 | Status: DISCONTINUED | OUTPATIENT
Start: 2019-05-30 | End: 2019-06-01

## 2019-05-30 RX ORDER — OLANZAPINE 15 MG/1
15 TABLET, FILM COATED ORAL
Refills: 0 | Status: DISCONTINUED | OUTPATIENT
Start: 2019-05-30 | End: 2019-06-01

## 2019-05-30 RX ORDER — OXYCODONE AND ACETAMINOPHEN 5; 325 MG/1; MG/1
1 TABLET ORAL ONCE
Refills: 0 | Status: DISCONTINUED | OUTPATIENT
Start: 2019-05-30 | End: 2019-05-30

## 2019-05-30 RX ORDER — BENZTROPINE MESYLATE 1 MG
0 TABLET ORAL
Qty: 0 | Refills: 0 | DISCHARGE

## 2019-05-30 RX ORDER — SODIUM CHLORIDE 9 MG/ML
1000 INJECTION INTRAMUSCULAR; INTRAVENOUS; SUBCUTANEOUS
Refills: 0 | Status: DISCONTINUED | OUTPATIENT
Start: 2019-05-30 | End: 2019-05-31

## 2019-05-30 RX ORDER — MORPHINE SULFATE 50 MG/1
2 CAPSULE, EXTENDED RELEASE ORAL EVERY 4 HOURS
Refills: 0 | Status: DISCONTINUED | OUTPATIENT
Start: 2019-05-30 | End: 2019-05-31

## 2019-05-30 RX ORDER — HEPARIN SODIUM 5000 [USP'U]/ML
5000 INJECTION INTRAVENOUS; SUBCUTANEOUS EVERY 8 HOURS
Refills: 0 | Status: DISCONTINUED | OUTPATIENT
Start: 2019-05-30 | End: 2019-06-01

## 2019-05-30 RX ADMIN — HEPARIN SODIUM 5000 UNIT(S): 5000 INJECTION INTRAVENOUS; SUBCUTANEOUS at 22:59

## 2019-05-30 RX ADMIN — MORPHINE SULFATE 2 MILLIGRAM(S): 50 CAPSULE, EXTENDED RELEASE ORAL at 23:59

## 2019-05-30 RX ADMIN — SODIUM CHLORIDE 75 MILLILITER(S): 9 INJECTION INTRAMUSCULAR; INTRAVENOUS; SUBCUTANEOUS at 18:42

## 2019-05-30 RX ADMIN — OXYCODONE AND ACETAMINOPHEN 1 TABLET(S): 5; 325 TABLET ORAL at 13:34

## 2019-05-30 RX ADMIN — LEVETIRACETAM 500 MILLIGRAM(S): 250 TABLET, FILM COATED ORAL at 18:40

## 2019-05-30 NOTE — H&P ADULT - ASSESSMENT
54 year old female being evaluated for     Pre-Syncope  - Most likely due to seizure episode  - Valproic acid level significantly lower now than in the beginning of the month in the ER. She states she is compliant.  - Requested REEG  - Keep magnesium above 2 and potassium above 4  - Aspiration, Fall and Seizure precautions  - Requested MRI brain without contrast  - Increased Keppra to 500 mg twice daily from 250 mg thrice daily, will continue depakote 500 mg twice daily as per neurology recommendations  - Routine Neuro-checks  - Neurology following  - Will get echocardiogram of the heart and request for orthostatic vital signs    Left mid-forearm displaced fractures of both bones  - no emergent Orthopedic surgical intervention done in the ER  - Non Weight Bearing Left Upper Extremity for now  - Pain control, arm elevation and splint care  - Orthopedic following    Bipolar Disorder  - Continue home dose of olanzapine for now    Full Code  On DVT prophylaxis with Heparin  Will start on Protonix for suspected G.E.R.D  Lives with a Room mate

## 2019-05-30 NOTE — H&P ADULT - NSHPSOCIALHISTORY_GEN_ALL_CORE
recently started smoking 4 cigarettes per day, had stopped smoking 8 years ago  denies alcohol or drug abuse

## 2019-05-30 NOTE — ED PROVIDER NOTE - CARE PLAN
Principal Discharge DX:	Seizure  Secondary Diagnosis:	Radius fracture  Secondary Diagnosis:	Ulnar fracture

## 2019-05-30 NOTE — H&P ADULT - NSHPLABSRESULTS_GEN_ALL_CORE
VITALS:   T(F): 97.3  HR: 71  BP: 127/76  RR: 18  SpO2: 98%    LABS:                        13.7   10.26 )-----------( 262      ( 30 May 2019 14:00 )             42.0     05-30    143  |  104  |  17  ----------------------------<  86  4.4   |  23  |  0.8    Ca    9.8      30 May 2019 14:00    TPro  7.8  /  Alb  4.5  /  TBili  0.4  /  DBili  x   /  AST  29  /  ALT  15  /  AlkPhos  90  05-30          Troponin T, Serum: <0.01 ng/mL (05-30-19 @ 14:07)      CARDIAC MARKERS ( 30 May 2019 14:07 )  x     / <0.01 ng/mL / x     / x     / x          RADIOLOGY:    CT Head No Cont (05.30.19 @ 15:49) >    Unremarkable noncontrast head CT. No significant changes from the prior   exam.     Xray Forearm, Left (05.30.19 @ 14:03) >    Comminuted And Displaced Fractures Of The Diaphyses Of Both The Radius And Ulna.     Xray Hand 3 Views, Left (05.30.19 @ 13:55) >    No acute osseous or articular abnormality.

## 2019-05-30 NOTE — ED ADULT NURSE NOTE - OBJECTIVE STATEMENT
53 Y/O female brought in for left wrist injury. patient states she fell in the rain yesterday unknown if it was a seizure or accidental slip. patient fell on left side injured left wrist and has left shoulder pain. patient denies head trauma, no anticoagulant use, patient is alert and oriented at this time. patient denies chest pain, sob, dizziness, headache. patient received Tylenol at 8am for pain.

## 2019-05-30 NOTE — ED ADULT NURSE NOTE - NSIMPLEMENTINTERV_GEN_ALL_ED
Implemented All Fall with Harm Risk Interventions:  [place] to call system. Call bell, personal items and telephone within reach. Instruct patient to call for assistance. Room bathroom lighting operational. Non-slip footwear when patient is off stretcher. Physically safe environment: no spills, clutter or unnecessary equipment. Stretcher in lowest position, wheels locked, appropriate side rails in place. Provide visual cue, wrist band, yellow gown, etc. Monitor gait and stability. Monitor for mental status changes and reorient to person, place, and time. Review medications for side effects contributing to fall risk. Reinforce activity limits and safety measures with patient and family. Provide visual clues: red socks.

## 2019-05-30 NOTE — H&P ADULT - HISTORY OF PRESENT ILLNESS
54 year old female with pertinent medical history of seizure diagnosed more than 1 year ago (on keppra and depakote and vouches for compliance with medications) and bipolar disorder (on olanzapine) presents after two seizure episodes yesterday. She said she was walking out of store yesterday evening when after walking around 6 to 7 she collapsed, had shaking of the body. She said she shortly recovered on her own and went home. When she reached home she had another fall and some shaking while she was in the ground. She said she felt weak, slightly confused but oriented to surroundings and had a feeling that she might lose control of her bowel and bladder, however, denies any accident. She said during the episodes she hurt her Left forearm. She said over the last one year she has had at least 3 episodes of seizures. She denies any paresthesias, decreased sensation, any headache before or after the episode, any neck pain, visual changes, tongue bite, dizziness, chest pain, shortness of breath, palpitations before the episode. She however while in the ED she states that she noticed some dysarthria and left facial droop for the last few weeks. She has been walking by herself even though she has cane present at home. She walks slowly. She says she lives with a room mate ever since her mother .

## 2019-05-30 NOTE — CONSULT NOTE ADULT - ASSESSMENT
Impression:  55 y/o woman w hx Seizure disorder, Bipolar d/o s/p syncopal episode yesterday. Also w dysarthria and left facial droop.  VPA level significantly lower now than in the beginning of the month. Pt states she is compliant.    Plan:  REEG  check mag, keep >2  seizure precautions  MRI brain without christiana  continue Keppra 250 mg BID Impression:  55 y/o woman w hx Seizure disorder, Bipolar d/o s/p syncopal episode yesterday. Also w dysarthria and left facial droop.  VPA level significantly lower now than in the beginning of the month. Pt states she is compliant.    Plan:  REEG  check mag, keep >2  seizure precautions  MRI brain without christiana  increase Keppra 500 mg BID  continue depakote 500 mg bid

## 2019-05-30 NOTE — ED PROVIDER NOTE - OBJECTIVE STATEMENT
54 year old female hx dx with seizure d/o 1 year ago c/o injury to L arm. Pt sts she was walking out of a store at 6pm yesterday when she collapsed and hurt her right arm. Pt sts she does not know if she had a seizure 54 year old female hx dx with seizure d/o dx 1 year ago (on keppra/depakote, compliant with meds)  c/o injury to L arm. Pt sts she was walking out of a store at 6pm yesterday when she collapsed and hurt her Left forearm. Pt sts she does not know if she had a seizure or not. Sts she walked home afterwards. Currently denies any paresthesias, decreased sensation, bruising, abrasions, headache, neck, back pain, visual changes, dizziness, chest pain, sob, inability to ambulate.

## 2019-05-30 NOTE — H&P ADULT - ATTENDING COMMENTS
A 55 yo  Female with PMH of Seizure and Bipolar disease came to ED c/o left arm pain and seizure. She was walking in the street Wednesday evening when she tripped and fell on her left side, she couldn't come to the ED because of heavy rain and waited until today. She states her fall was not from Seizure or syncope, but she had one episode a week ago when she collapsed at home and she thinks she had a seizure at that time. She denies headache, blurry vision, focal weakness, chest pain or SOB.   In the ED her arm X ray showed displaced fracture of distal bones radial and ulnar. Cast was placed.     PHYSICAL EXAM:  GENERAL: NAD, well-developed  HEAD:  Atraumatic, Normocephalic  EYES: EOMI, PERRLA, conjunctiva and sclera clear  NECK: Supple, No JVD  CHEST/LUNG: Clear to auscultation bilaterally; No wheeze  HEART: Regular rate and rhythm; No murmurs, rubs, or gallops  ABDOMEN: Soft, Nontender, Nondistended; Bowel sounds present  EXTREMITIES:  2+ Peripheral Pulses, No clubbing, LUE in cast.   PSYCH: AAOx3  NEUROLOGY: non-focal  SKIN: No rashes or lesions    A/P:   Fall and Syncope: likely Seizure episode  Head CT was negative  Neurology recommended to increase Keppra to 500mg BID and Continue Depakote.   EEG. Brain MRI  Seizure precaution.   Check Mg and     Left Arm fracture: distal Ulnar and radial bone.   s/p cast  Orthopedic follow up   Pain control.

## 2019-05-30 NOTE — CONSULT NOTE ADULT - SUBJECTIVE AND OBJECTIVE BOX
HPI:  54 year old female hx dx with seizure d/o dx 1 year ago (on keppra/depakote, compliant with meds)  c/o injury to L arm. Pt sts she was walking out of a store at 6pm yesterday when she collapsed and hurt her Left forearm. Pt sts she does not know if she had a seizure or not. Sts she walked home afterwards. Currently denies any paresthesias, decreased sensation, bruising, abrasions, headache, neck, back pain, visual changes, dizziness, chest pain, sob, inability to ambulate. Of note states she has had dysarthria and left facial droop for the last few weeks.  Last Sz 2 months ago    PAST MEDICAL & SURGICAL HISTORY:  Bipolar 1 disorder  Seizures  No significant past surgical history      Home Medications:  Cogentin:  (29 Aug 2018 13:16)  Depakote: 500 milligram(s) orally 2 times a day (29 Aug 2018 13:16)  Keppra 250 mg BID  Zyprexa      Neuro Exam:  Orientation: oriented to person, oriented to place and oriented to time.   Language: no difficulty naming common objects, no difficulty repeating a phrase  Cranial Nerves: visual acuity intact bilaterally, visual fields full to confrontation, pupils equal round and reactive to light, extraocular motion intact, facial sensation intact symmetrically, face w left droop, hearing was intact bilaterally, tongue and palate midline, head turning and shoulder shrug symmetric and there was no tongue deviation with protrusion.   mild dysarthria  Motor: muscle tone was normal in all four extremities, muscle strength was normal in all four extremities and normal bulk in all four extremities.   Sensory exam: light touch was intact.   Coordination:. normal gait. balance was intact. there was no past-pointing. no tremor present.   Deep tendon reflexes:   Biceps right 2+. Biceps left 2+.    Triceps right 2+. Triceps left 2+.  LOC  Brachioradialis right 2+. Brachioradialis left 2+.    Patella right 2+. Patella left 2+.    Ankle jerk right 2+. Ankle jerk left 2+.       Allergies    No Known Drug Allergies  Raspberries (Unknown)    Intolerances      MEDICATIONS  (STANDING):    MEDICATIONS  (PRN):      LABS:                        13.7   10.26 )-----------( 262      ( 30 May 2019 14:00 )             42.0     05-30    143  |  104  |  17  ----------------------------<  86  4.4   |  23  |  0.8    Ca    9.8      30 May 2019 14:00    TPro  7.8  /  Alb  4.5  /  TBili  0.4  /  DBili  x   /  AST  29  /  ALT  15  /  AlkPhos  90  05-30    Valproic Acid Level, Serum: 29.0 ug/mL (05.30.19 @ 14:00)  Valproic Acid Level, Serum: 65.0 ug/mL (05.07.18 @ 15:46)        < from: CT Head No Cont (05.30.19 @ 15:49) >  Comparison:  Head CT 5/3/2018.    Findings:    The ventricles and cortical sulci are normal in size and configuration.       Gray-white matter differentiation is maintained.     There is no acute intracranial hemorrhage, extra-axial fluid collection   or midline shift.      The osseous structures are unremarkable. The visualized paranasal sinuses   and mastoids are well-aerated.    IMPRESSION:     Unremarkable noncontrast head CT. No significant changes from the prior   exam.      < end of copied text >

## 2019-05-30 NOTE — CONSULT NOTE ADULT - ATTENDING COMMENTS
Patient interviewed and examined this morning.  She had event concerning for possible seizure and was on low dose of two anticonvulsants though depakote probably given for mood stabilization.  Keppra dose was increased from 250 mg bid to 750 mg bid after EEG demonstrated sharp waves in frontal region.  MRI negative for focality though poorly tolerated so full epilepsy protocol not completed.  No driving or tub baths.  Continue higher keppra dose of 750 mg bid.  Maintain mg > 2.0  Continue depakote for mood stabilization.  THis was not increased due to patient's complaint of being drowsy on it.  Outpatient neurologic f/u in 2-4 weeks.

## 2019-05-30 NOTE — ED ADULT TRIAGE NOTE - CHIEF COMPLAINT QUOTE
c/o left arm pain after falling outside, deformity noted, left radial pulse palpable, unknown if had seizure prior to fall (hx of seizures), denies loc, head injury. compliant with medications

## 2019-05-30 NOTE — CONSULT NOTE ADULT - SUBJECTIVE AND OBJECTIVE BOX
Called to evaluate a 53 y/o obese female s/p seizure today at home c/o Left forearm (6/10) with motion and deformity.  Denies any other injuries or c/o.  Patient is Right hand dominant.    PMHx: bipolar disorder, seizure disorder.  Allergies: NKDA    Vital Signs Last 24 Hrs  T(C): 36.4 (30 May 2019 11:29), Max: 36.4 (30 May 2019 11:29)  T(F): 97.6 (30 May 2019 11:29), Max: 97.6 (30 May 2019 11:29)  HR: 102 (30 May 2019 11:34) (102 - 112)  BP: 132/97 (30 May 2019 11:29) (132/97 - 132/97)  BP(mean): --  RR: 18 (30 May 2019 11:29) (18 - 18)  SpO2: 98% (30 May 2019 11:29) (98% - 98%)    GEN: obese, A&O X 3, NAD    on PE: dorsal angulation deformity noted Left mid-forearm , minimal effusion, skin intact, limited ROM Left elbow and wrist secondary to pain, +TTP mid-forearm, NVI, moves all fingers, no decreased sensation, brisk cap refill;    X-ray: Left mid-forearm both bones displaced fractures;    Procedure: "sugar tong" splint application Left forearm.                          13.7   10.26 )-----------( 262      ( 30 May 2019 14:00 )             42.0   05-30    143  |  104  |  17  ----------------------------<  86  4.4   |  23  |  0.8    Ca    9.8      30 May 2019 14:00    TPro  7.8  /  Alb  4.5  /  TBili  0.4  /  DBili  x   /  AST  29  /  ALT  15  /  AlkPhos  90  05-30      A/P: Left mid-forearm both bones displaced fractures:  - no emergent Orthopedic surgical intervention at this time;  - NWB Left UE, pain management; elevation, splint care;  - case d/w ED and Ortho attending on call and their evaluation to follow.

## 2019-05-30 NOTE — ED PROVIDER NOTE - NS ED ROS FT
Constitutional: no fever, chills, no recent weight loss, change in appetite or malaise  Eyes: no redness/discharge/pain/vision changes  Cardiac: No chest pain, SOB or edema.  Respiratory: No cough or respiratory distress  GI: No nausea, vomiting, diarrhea or abdominal pain.  : No dysuria, frequency, urgency or hematuria  MS: + pain and swelling to Left mid forearm  Neuro: No headache or weakness. No LOC. No decreased sensation/paresthesias  Skin: No skin rash, bruising, abrasions  Endocrine: No history of thyroid disease or diabetes.  Except as documented in the HPI, all other systems are negative.

## 2019-05-30 NOTE — ED PROVIDER NOTE - ATTENDING CONTRIBUTION TO CARE
55 yo f with pmh of seizure d/o (allegedly compliant with her keppra and depakote) presents with c/o syncope v. seizure yesterday and left arm pain.  pt says had LOC yesterday falling on her left side.  witnessed and helped up by bystanders.  however, unclear if she had convulsions.  pt denies biting her lip or losing bowel/bladder control last night.  she landed on her left side and had left arm/shoulder pain but because it was about to rain, she did not go to ED and decided to go home.  pt continues to have left arm pain.  exam: nad, ncat, perrl, eomi, mmm, rrr, ctab, abd soft, nt,nd aox3, left forearm indurated< +midshaft gross deformity, ttp, +radial pulse, sensation intact imp: pt with syncope v. seizure, also with left midshaft radius and ulna fracture, ct head, labs, neuro consult, ortho consult

## 2019-05-30 NOTE — ED PROVIDER NOTE - CLINICAL SUMMARY MEDICAL DECISION MAKING FREE TEXT BOX
Pt with seizure v. syncope, left ulna/radius fracture midshaft, ortho consulted and splinted, neuro consulted, found pt to have dysarthria and left facial droop chavez pt says has been ongoing x 1 month, neuro recommended admission to tele and imaging

## 2019-05-30 NOTE — ED PROVIDER NOTE - PHYSICAL EXAMINATION
CONSTITUTIONAL: Well-appearing; well-nourished; in no apparent distress.   EYES: PERRL; EOM intact, no nystagmus  NECK: No c spine ttp. Full rom of cervical spine  CARDIOVASCULAR: Normal S1, S2; no murmurs, rubs, or gallops.   RESPIRATORY: Normal chest excursion with respiration; breath sounds clear and equal bilaterally; no wheezes, rhonchi, or rales.  GI/: Normal bowel sounds; non-distended; non-tender; no palpable organomegaly.   MS: No midline spinal ttp. + deformity noted over mid radius/ulnar with swelling and ttp. Radial pulses intact. hand  intact. Normal gait  SKIN: Normal for age and race; warm; dry; good turgor; no apparent lesions or exudate.   NEURO/PSYCH: A & O x 4; grossly unremarkable. mood and manner are appropriate. No focal deficits. No facial droop. No tongue deviation, cerebellar intact. Sensation intact

## 2019-05-30 NOTE — H&P ADULT - NSHPPHYSICALEXAM_GEN_ALL_CORE
GEN: weak looking, tired  LUNGS: breath sounds bilaterally   HEART: S1/S2 present. RRR.   ABD: Soft, tender epigastrium, non-distended.  EXT: No edema  NEURO: AAOX3, mild dysarthria, muscle tone was normal in all four extremities, muscle strength was normal in three extremities and normal bulk in all four extremities. in left forearm splinting is done with limited range of motion

## 2019-05-31 ENCOUNTER — TRANSCRIPTION ENCOUNTER (OUTPATIENT)
Age: 54
End: 2019-05-31

## 2019-05-31 LAB
ANION GAP SERPL CALC-SCNC: 13 MMOL/L — SIGNIFICANT CHANGE UP (ref 7–14)
BASOPHILS # BLD AUTO: 0.02 K/UL — SIGNIFICANT CHANGE UP (ref 0–0.2)
BASOPHILS NFR BLD AUTO: 0.3 % — SIGNIFICANT CHANGE UP (ref 0–1)
BUN SERPL-MCNC: 17 MG/DL — SIGNIFICANT CHANGE UP (ref 10–20)
CALCIUM SERPL-MCNC: 9.3 MG/DL — SIGNIFICANT CHANGE UP (ref 8.5–10.1)
CHLORIDE SERPL-SCNC: 107 MMOL/L — SIGNIFICANT CHANGE UP (ref 98–110)
CO2 SERPL-SCNC: 23 MMOL/L — SIGNIFICANT CHANGE UP (ref 17–32)
CREAT SERPL-MCNC: 0.8 MG/DL — SIGNIFICANT CHANGE UP (ref 0.7–1.5)
EOSINOPHIL # BLD AUTO: 0.15 K/UL — SIGNIFICANT CHANGE UP (ref 0–0.7)
EOSINOPHIL NFR BLD AUTO: 2.2 % — SIGNIFICANT CHANGE UP (ref 0–8)
GLUCOSE SERPL-MCNC: 96 MG/DL — SIGNIFICANT CHANGE UP (ref 70–99)
HCT VFR BLD CALC: 39.5 % — SIGNIFICANT CHANGE UP (ref 37–47)
HGB BLD-MCNC: 12.8 G/DL — SIGNIFICANT CHANGE UP (ref 12–16)
IMM GRANULOCYTES NFR BLD AUTO: 0.4 % — HIGH (ref 0.1–0.3)
LYMPHOCYTES # BLD AUTO: 2.93 K/UL — SIGNIFICANT CHANGE UP (ref 1.2–3.4)
LYMPHOCYTES # BLD AUTO: 43.2 % — SIGNIFICANT CHANGE UP (ref 20.5–51.1)
MAGNESIUM SERPL-MCNC: 2.1 MG/DL — SIGNIFICANT CHANGE UP (ref 1.8–2.4)
MCHC RBC-ENTMCNC: 30.8 PG — SIGNIFICANT CHANGE UP (ref 27–31)
MCHC RBC-ENTMCNC: 32.4 G/DL — SIGNIFICANT CHANGE UP (ref 32–37)
MCV RBC AUTO: 95 FL — SIGNIFICANT CHANGE UP (ref 81–99)
MONOCYTES # BLD AUTO: 0.7 K/UL — HIGH (ref 0.1–0.6)
MONOCYTES NFR BLD AUTO: 10.3 % — HIGH (ref 1.7–9.3)
NEUTROPHILS # BLD AUTO: 2.96 K/UL — SIGNIFICANT CHANGE UP (ref 1.4–6.5)
NEUTROPHILS NFR BLD AUTO: 43.6 % — SIGNIFICANT CHANGE UP (ref 42.2–75.2)
NRBC # BLD: 0 /100 WBCS — SIGNIFICANT CHANGE UP (ref 0–0)
PLATELET # BLD AUTO: 252 K/UL — SIGNIFICANT CHANGE UP (ref 130–400)
POTASSIUM SERPL-MCNC: 4.3 MMOL/L — SIGNIFICANT CHANGE UP (ref 3.5–5)
POTASSIUM SERPL-SCNC: 4.3 MMOL/L — SIGNIFICANT CHANGE UP (ref 3.5–5)
RBC # BLD: 4.16 M/UL — LOW (ref 4.2–5.4)
RBC # FLD: 14.5 % — SIGNIFICANT CHANGE UP (ref 11.5–14.5)
SODIUM SERPL-SCNC: 143 MMOL/L — SIGNIFICANT CHANGE UP (ref 135–146)
VALPROATE SERPL-MCNC: 38 UG/ML — LOW (ref 50–100)
WBC # BLD: 6.79 K/UL — SIGNIFICANT CHANGE UP (ref 4.8–10.8)
WBC # FLD AUTO: 6.79 K/UL — SIGNIFICANT CHANGE UP (ref 4.8–10.8)

## 2019-05-31 PROCEDURE — 93306 TTE W/DOPPLER COMPLETE: CPT | Mod: 26

## 2019-05-31 PROCEDURE — 95816 EEG AWAKE AND DROWSY: CPT | Mod: 26

## 2019-05-31 RX ORDER — ACETAMINOPHEN 500 MG
650 TABLET ORAL EVERY 6 HOURS
Refills: 0 | Status: DISCONTINUED | OUTPATIENT
Start: 2019-05-31 | End: 2019-06-01

## 2019-05-31 RX ORDER — LEVETIRACETAM 250 MG/1
750 TABLET, FILM COATED ORAL
Refills: 0 | Status: DISCONTINUED | OUTPATIENT
Start: 2019-05-31 | End: 2019-06-01

## 2019-05-31 RX ORDER — LEVETIRACETAM 250 MG/1
750 TABLET, FILM COATED ORAL ONCE
Refills: 0 | Status: COMPLETED | OUTPATIENT
Start: 2019-05-31 | End: 2019-05-31

## 2019-05-31 RX ADMIN — Medication 650 MILLIGRAM(S): at 02:15

## 2019-05-31 RX ADMIN — DIVALPROEX SODIUM 500 MILLIGRAM(S): 500 TABLET, DELAYED RELEASE ORAL at 06:12

## 2019-05-31 RX ADMIN — LEVETIRACETAM 500 MILLIGRAM(S): 250 TABLET, FILM COATED ORAL at 06:12

## 2019-05-31 RX ADMIN — HEPARIN SODIUM 5000 UNIT(S): 5000 INJECTION INTRAVENOUS; SUBCUTANEOUS at 06:11

## 2019-05-31 RX ADMIN — Medication 650 MILLIGRAM(S): at 23:15

## 2019-05-31 RX ADMIN — OLANZAPINE 15 MILLIGRAM(S): 15 TABLET, FILM COATED ORAL at 06:12

## 2019-05-31 RX ADMIN — HEPARIN SODIUM 5000 UNIT(S): 5000 INJECTION INTRAVENOUS; SUBCUTANEOUS at 22:37

## 2019-05-31 RX ADMIN — OLANZAPINE 15 MILLIGRAM(S): 15 TABLET, FILM COATED ORAL at 17:49

## 2019-05-31 RX ADMIN — LEVETIRACETAM 750 MILLIGRAM(S): 250 TABLET, FILM COATED ORAL at 17:51

## 2019-05-31 RX ADMIN — DIVALPROEX SODIUM 500 MILLIGRAM(S): 500 TABLET, DELAYED RELEASE ORAL at 17:49

## 2019-05-31 RX ADMIN — MORPHINE SULFATE 2 MILLIGRAM(S): 50 CAPSULE, EXTENDED RELEASE ORAL at 06:16

## 2019-05-31 RX ADMIN — HEPARIN SODIUM 5000 UNIT(S): 5000 INJECTION INTRAVENOUS; SUBCUTANEOUS at 14:09

## 2019-05-31 NOTE — DISCHARGE NOTE NURSING/CASE MANAGEMENT/SOCIAL WORK - NSDCDPATPORTLINK_GEN_ALL_CORE
You can access the [facility] Patient Portal, offered by [facility], by registering with the following website: [email or web address]

## 2019-05-31 NOTE — CHART NOTE - NSCHARTNOTEFT_GEN_A_CORE
MRI brain although incomplete failed to reveal acute stroke , EEG was +epileptiform activity , discussed with neuro attending on call Dr Lela Daniel who recommends    1. Administer Keppra 750mg iv x 1 dose now and continue 500mg bid as maintenance.   2. Please keep mg levels >2 Replete if low  3  Maintain seizure precautions MRI brain although incomplete failed to reveal acute stroke , EEG was +epileptiform activity , discussed with neuro attending on call Dr Lela Daniel who recommends    1. Administer Keppra 750mg iv x 1 dose now and increase Keppra to 750mg bid as maintenance.   2. Please keep mg levels >2 Replete if low  3  Maintain seizure precautions

## 2019-05-31 NOTE — PROGRESS NOTE ADULT - SUBJECTIVE AND OBJECTIVE BOX
OVERNIGHT EVENTS:   No acute overnight events.  Comfortable and no complaints.    MEDICATIONS:  STANDING MEDICATIONS  diVALproex  milliGRAM(s) Oral every 12 hours  heparin  Injectable 5000 Unit(s) SubCutaneous every 8 hours  levETIRAcetam 500 milliGRAM(s) Oral two times a day  OLANZapine 15 milliGRAM(s) Oral two times a day  sodium chloride 0.9%. 1000 milliLiter(s) IV Continuous <Continuous>    PRN MEDICATIONS  acetaminophen   Tablet .. 650 milliGRAM(s) Oral every 6 hours PRN  morphine  - Injectable 2 milliGRAM(s) IV Push every 4 hours PRN    VITALS:   T(F): 98.3  HR: 85  BP: 124/79  RR: 18  SpO2: 98%    LABS:                        13.7   10.26 )-----------( 262      ( 30 May 2019 14:00 )             42.0     05-30    143  |  104  |  17  ----------------------------<  86  4.4   |  23  |  0.8    Ca    9.8      30 May 2019 14:00    TPro  7.8  /  Alb  4.5  /  TBili  0.4  /  DBili  x   /  AST  29  /  ALT  15  /  AlkPhos  90  05-30          Troponin T, Serum: <0.01 ng/mL (05-30-19 @ 14:07)      CARDIAC MARKERS ( 30 May 2019 14:07 )  x     / <0.01 ng/mL / x     / x     / x          RADIOLOGY:  < from: Xray Forearm, Left (05.30.19 @ 18:27) >  Interval placement of an overlying cast which obscures fine bony detail.   There is mildly improved alignment of the mid radius and ulna diaphyseal   fractures with persistent lateral displacement.    < end of copied text >    PHYSICAL EXAM:  GEN: No acute distress  HEENT: NCAT  LUNGS: Clear to auscultation bilaterally   HEART: S1/S2 present. RRR.   ABD: Soft, non-tender, non-distended. Bowel sounds present  EXT: No pitting edema. L arm in sling  NEURO: AAOX3

## 2019-05-31 NOTE — DISCHARGE NOTE NURSING/CASE MANAGEMENT/SOCIAL WORK - NSDCPEWEB_GEN_ALL_CORE
[facility] for Tobacco Control website --- [email or web address]

## 2019-05-31 NOTE — PROGRESS NOTE ADULT - ASSESSMENT
53 yo F with PMHx of bipolar disorder and seizure disorder, last seizures was last week and she was admitted to [facility] on 04/03 for seizure episode, being evaluated for syncopal event yesterday likely secondary to seizure.    #) Syncope secondary to seizure  - Will send for repeat Valproic and Keppra level at 16:00  - Keep Mg > 2, K > 4   - Aspiration, fall and seizure precaution  - Follow-up MRI Brain NC, EEG  - Follow-up 2D Echo, Orthostatics    - Neurology: Increased Keppra from 250 mg TID to 500 mg BID, continue Depakote 500 mg BID. Routine Neurochecks.     #) Traumatic L arm injury secondary to unwitnessed mechanical fall   - L Arm XR showed displaced fracture of ulnar/radial diaphyses   - HETAL HOLLIS   - Orthopedics: No emergent surgical intervention needed   - Pain Control: Tylenol 650 mg Q6H PRN Mild  - Arm elevation and splint care     #) Bipolar Disorder  - Continue home dose of olanzapine for now    #) DVT ppx: HSQ   #) GI ppx: PO Protonix 40 mg QD    #) Diet: Regular, aspiration precaution  #) Activity: Ambulate as tolerated, fall risk, seizure risk    #) Dispo: From Home, lives with a roommate   #) Full Code 53 yo F with PMHx of bipolar disorder and seizure disorder, last seizures was last week and she was admitted to [facility] on 04/03 for seizure episode, being evaluated for syncopal event yesterday likely secondary to seizure.    #) Syncope secondary to seizure  - Will send for repeat Valproic and Keppra level at 16:00  - Keep Mg > 2, K > 4   - Aspiration, fall and seizure precaution  - Follow-up MRI Brain NC, EEG  - Follow-up 2D Echo, Orthostatics    - Neurology: Increased Keppra from 250 mg TID to 500 mg BID, continue Depakote 500 mg BID. Routine Neurochecks.     #) Traumatic L arm injury secondary to unwitnessed mechanical fall   - L Arm XR showed displaced fracture of ulnar/radial diaphyses   - HETAL HOLLIS   - Orthopedics: No emergent surgical intervention needed. She will need an outpatient appointment with Dr. Ramos for surgery planning for next week.   - Pain Control: Tylenol 650 mg Q6H PRN Mild  - Arm elevation and splint care     #) Bipolar Disorder  - Continue with Olanzapine 15 mg BID     #) DVT ppx: HSQ   #) GI ppx: PO Protonix 40 mg QD    #) Diet: Regular, aspiration precaution  #) Activity: Ambulate as tolerated, fall risk, seizure risk    #) Dispo: From Home, lives with a roommate   #) Full Code

## 2019-06-01 ENCOUNTER — TRANSCRIPTION ENCOUNTER (OUTPATIENT)
Age: 54
End: 2019-06-01

## 2019-06-01 VITALS — TEMPERATURE: 97 F | SYSTOLIC BLOOD PRESSURE: 124 MMHG | DIASTOLIC BLOOD PRESSURE: 79 MMHG | HEART RATE: 95 BPM

## 2019-06-01 LAB
ALBUMIN SERPL ELPH-MCNC: 3.9 G/DL — SIGNIFICANT CHANGE UP (ref 3.5–5.2)
ALP SERPL-CCNC: 84 U/L — SIGNIFICANT CHANGE UP (ref 30–115)
ALT FLD-CCNC: 13 U/L — SIGNIFICANT CHANGE UP (ref 0–41)
ANION GAP SERPL CALC-SCNC: 13 MMOL/L — SIGNIFICANT CHANGE UP (ref 7–14)
AST SERPL-CCNC: 18 U/L — SIGNIFICANT CHANGE UP (ref 0–41)
BASOPHILS # BLD AUTO: 0.02 K/UL — SIGNIFICANT CHANGE UP (ref 0–0.2)
BASOPHILS NFR BLD AUTO: 0.3 % — SIGNIFICANT CHANGE UP (ref 0–1)
BILIRUB DIRECT SERPL-MCNC: <0.2 MG/DL — SIGNIFICANT CHANGE UP (ref 0–0.2)
BILIRUB INDIRECT FLD-MCNC: >0.3 MG/DL — SIGNIFICANT CHANGE UP (ref 0.2–1.2)
BILIRUB SERPL-MCNC: 0.5 MG/DL — SIGNIFICANT CHANGE UP (ref 0.2–1.2)
BUN SERPL-MCNC: 17 MG/DL — SIGNIFICANT CHANGE UP (ref 10–20)
CALCIUM SERPL-MCNC: 9.3 MG/DL — SIGNIFICANT CHANGE UP (ref 8.5–10.1)
CHLORIDE SERPL-SCNC: 105 MMOL/L — SIGNIFICANT CHANGE UP (ref 98–110)
CO2 SERPL-SCNC: 23 MMOL/L — SIGNIFICANT CHANGE UP (ref 17–32)
CREAT SERPL-MCNC: 0.8 MG/DL — SIGNIFICANT CHANGE UP (ref 0.7–1.5)
EOSINOPHIL # BLD AUTO: 0.14 K/UL — SIGNIFICANT CHANGE UP (ref 0–0.7)
EOSINOPHIL NFR BLD AUTO: 2.4 % — SIGNIFICANT CHANGE UP (ref 0–8)
GLUCOSE SERPL-MCNC: 93 MG/DL — SIGNIFICANT CHANGE UP (ref 70–99)
HCT VFR BLD CALC: 39 % — SIGNIFICANT CHANGE UP (ref 37–47)
HGB BLD-MCNC: 12.5 G/DL — SIGNIFICANT CHANGE UP (ref 12–16)
IMM GRANULOCYTES NFR BLD AUTO: 0.7 % — HIGH (ref 0.1–0.3)
LYMPHOCYTES # BLD AUTO: 2.89 K/UL — SIGNIFICANT CHANGE UP (ref 1.2–3.4)
LYMPHOCYTES # BLD AUTO: 48.7 % — SIGNIFICANT CHANGE UP (ref 20.5–51.1)
MAGNESIUM SERPL-MCNC: 2.1 MG/DL — SIGNIFICANT CHANGE UP (ref 1.8–2.4)
MCHC RBC-ENTMCNC: 30.6 PG — SIGNIFICANT CHANGE UP (ref 27–31)
MCHC RBC-ENTMCNC: 32.1 G/DL — SIGNIFICANT CHANGE UP (ref 32–37)
MCV RBC AUTO: 95.4 FL — SIGNIFICANT CHANGE UP (ref 81–99)
MONOCYTES # BLD AUTO: 0.6 K/UL — SIGNIFICANT CHANGE UP (ref 0.1–0.6)
MONOCYTES NFR BLD AUTO: 10.1 % — HIGH (ref 1.7–9.3)
NEUTROPHILS # BLD AUTO: 2.25 K/UL — SIGNIFICANT CHANGE UP (ref 1.4–6.5)
NEUTROPHILS NFR BLD AUTO: 37.8 % — LOW (ref 42.2–75.2)
NRBC # BLD: 0 /100 WBCS — SIGNIFICANT CHANGE UP (ref 0–0)
PLATELET # BLD AUTO: 234 K/UL — SIGNIFICANT CHANGE UP (ref 130–400)
POTASSIUM SERPL-MCNC: 4.4 MMOL/L — SIGNIFICANT CHANGE UP (ref 3.5–5)
POTASSIUM SERPL-SCNC: 4.4 MMOL/L — SIGNIFICANT CHANGE UP (ref 3.5–5)
PROT SERPL-MCNC: 6.8 G/DL — SIGNIFICANT CHANGE UP (ref 6–8)
RBC # BLD: 4.09 M/UL — LOW (ref 4.2–5.4)
RBC # FLD: 14.5 % — SIGNIFICANT CHANGE UP (ref 11.5–14.5)
SODIUM SERPL-SCNC: 141 MMOL/L — SIGNIFICANT CHANGE UP (ref 135–146)
WBC # BLD: 5.94 K/UL — SIGNIFICANT CHANGE UP (ref 4.8–10.8)
WBC # FLD AUTO: 5.94 K/UL — SIGNIFICANT CHANGE UP (ref 4.8–10.8)

## 2019-06-01 PROCEDURE — 99239 HOSP IP/OBS DSCHRG MGMT >30: CPT

## 2019-06-01 RX ORDER — LEVETIRACETAM 250 MG/1
1 TABLET, FILM COATED ORAL
Qty: 60 | Refills: 0
Start: 2019-06-01 | End: 2019-06-30

## 2019-06-01 RX ORDER — IBUPROFEN 200 MG
1 TABLET ORAL
Qty: 30 | Refills: 0
Start: 2019-06-01 | End: 2019-06-15

## 2019-06-01 RX ORDER — ONDANSETRON 8 MG/1
4 TABLET, FILM COATED ORAL EVERY 6 HOURS
Refills: 0 | Status: DISCONTINUED | OUTPATIENT
Start: 2019-06-01 | End: 2019-06-01

## 2019-06-01 RX ADMIN — HEPARIN SODIUM 5000 UNIT(S): 5000 INJECTION INTRAVENOUS; SUBCUTANEOUS at 05:32

## 2019-06-01 RX ADMIN — DIVALPROEX SODIUM 500 MILLIGRAM(S): 500 TABLET, DELAYED RELEASE ORAL at 18:03

## 2019-06-01 RX ADMIN — Medication 650 MILLIGRAM(S): at 06:48

## 2019-06-01 RX ADMIN — Medication 650 MILLIGRAM(S): at 11:49

## 2019-06-01 RX ADMIN — DIVALPROEX SODIUM 500 MILLIGRAM(S): 500 TABLET, DELAYED RELEASE ORAL at 05:32

## 2019-06-01 RX ADMIN — Medication 650 MILLIGRAM(S): at 18:09

## 2019-06-01 RX ADMIN — OLANZAPINE 15 MILLIGRAM(S): 15 TABLET, FILM COATED ORAL at 05:32

## 2019-06-01 RX ADMIN — LEVETIRACETAM 750 MILLIGRAM(S): 250 TABLET, FILM COATED ORAL at 05:32

## 2019-06-01 RX ADMIN — OLANZAPINE 15 MILLIGRAM(S): 15 TABLET, FILM COATED ORAL at 18:04

## 2019-06-01 RX ADMIN — LEVETIRACETAM 750 MILLIGRAM(S): 250 TABLET, FILM COATED ORAL at 18:04

## 2019-06-01 NOTE — PROGRESS NOTE ADULT - SUBJECTIVE AND OBJECTIVE BOX
SUBJECTIVE:    Patient is a 54y old Female who presents with a chief complaint of Seizures (31 May 2019 11:00)    Currently admitted to medicine with the primary diagnosis of Syncope 2/2 Seizure      Today is hospital day 2d. This morning she is resting comfortably in bed and reports some mild epigastric pain and no bloody non billious emesis after breakfast. No dizziness ha, siezure overnight, ambulates freely.     PAST MEDICAL & SURGICAL HISTORY  Bipolar 1 disorder  Seizures  No significant past surgical history    SOCIAL HISTORY:  Negative for smoking/alcohol/drug use.     ALLERGIES:  No Known Drug Allergies  Raspberries (Unknown)    MEDICATIONS:  STANDING MEDICATIONS  diVALproex  milliGRAM(s) Oral every 12 hours  heparin  Injectable 5000 Unit(s) SubCutaneous every 8 hours  levETIRAcetam 750 milliGRAM(s) Oral two times a day  OLANZapine 15 milliGRAM(s) Oral two times a day    PRN MEDICATIONS  acetaminophen   Tablet .. 650 milliGRAM(s) Oral every 6 hours PRN  ondansetron Injectable 4 milliGRAM(s) IV Push every 6 hours PRN    VITALS:   T(F): 95.7  HR: 77  BP: 129/75  RR: 18  SpO2: 98%    LABS:                        12.5   5.94  )-----------( 234      ( 01 Jun 2019 05:53 )             39.0     06-01    141  |  105  |  17  ----------------------------<  93  4.4   |  23  |  0.8    Ca    9.3      01 Jun 2019 05:53  Mg     2.1     06-01    TPro  6.8  /  Alb  3.9  /  TBili  0.5  /  DBili  <0.2  /  AST  18  /  ALT  13  /  AlkPhos  84  06-01    CARDIAC MARKERS ( 30 May 2019 14:07 )  x     / <0.01 ng/mL / x     / x     / x          RADIOLOGY:  < from: MR Head No Cont (05.30.19 @ 23:29) >  Findings:    The ventricles and cortical sulci are normal in size and configuration.    There is an empty sella turcica.    Although there is motion artifact, there is no evidence for intracranial   mass effect.    Few punctiform foci of T2 signal within the white matter are nonspecific   and probably reflect minimal microvascular ischemic change.    There is no evidence for restricted diffusion.    The thin section coronal sequences and sequences used for seizure imaging   are not included due to early termination of the examination.    Impression:    1.  No intracranial mass effect    2.  Few punctiform foci of signal change in the white matter, probably   due to mild microvascular ischemia    3.Patient terminated the examination due to back pain. Sequences for   seizure detection were not included.       < end of copied text >      < from: Xray Forearm, Left (05.30.19 @ 18:27) >  Impression:  Interval placement of an overlying cast which obscures fine bony detail.   There is mildly improved alignment of the mid radius and ulna diaphyseal   fractures with persistent lateral displacement.      < end of copied text >    < from: EEG (05.31.19 @ 12:00) >    State  Awake and Drowsy    Symmetry  Symmetric    Organization  Well organized     PDR   Continuous  Background: 10 hz    Generalized Slowing  No    Focal Slowing  Yes  Right  moderate  hemisphere    Breach Artifact:   No      Activation Procedure  Hyper Ventilation  No    Photic Stimulation  No    Epileptiform Activity  Yes    Frequency:  rare    Side:  Right    Type:  Epileptiform sharp waves    Area of Activity:  Frontal    Events:  No    Impression  Abnormal due to the presence of: focal slowing as above, interictal   activity as above      Clinical Correlation & Recommendations   Consistent with focal electrophysiological dysfunction secondary to   nonspecific cause. Consistent with potential for partial seizure from   RIGHT hemisphere.      < end of copied text >      PHYSICAL EXAM:  GEN: No acute distress  LUNGS: Clear to auscultation bilaterally   HEART: S1/S2 present. RRR.   ABD: Soft, non-tender, non-distended. Bowel sounds present  EXT: NC/NC/NE/2+PP/NIETO/Skin Intact. L Extremity in cast sensation in all fingers intact with good ROM  NEURO: AAOX3, non focal, CN 2-12 intact, MS 5/5 Upper and lower extremity. LUE exam limited by fractures and cast    Intravenous access:   NG tube:   Manuel cathter:

## 2019-06-01 NOTE — DISCHARGE NOTE PROVIDER - CARE PROVIDER_API CALL
Jim Cottrell (DO)  Medicine  [facility]icians  [address] [place] [address]  Phone: [phone]  Fax: [phone]  Follow Up Time:

## 2019-06-01 NOTE — DISCHARGE NOTE PROVIDER - NSDCCPCAREPLAN_GEN_ALL_CORE_FT
PRINCIPAL DISCHARGE DIAGNOSIS  Diagnosis: Seizure  Assessment and Plan of Treatment: stable, continue the current dose of medications, avoid driving or working or heavy machine, avoid alcohol,      SECONDARY DISCHARGE DIAGNOSES  Diagnosis: Ulnar fracture  Assessment and Plan of Treatment: outpatient follow up with orthopedic    Diagnosis: Radius fracture  Assessment and Plan of Treatment:

## 2019-06-01 NOTE — DISCHARGE NOTE PROVIDER - HOSPITAL COURSE
A 53 yo  Female with PMH of Seizure and Bipolar disease came to ED c/o left arm pain and seizure. She was walking in the street Wednesday evening when she tripped and fell on her left side, she couldn't come to the ED because of heavy rain and waited until today. She states her fall was not from Seizure or syncope, but she had one episode a week ago when she collapsed at home and she thinks she had a seizure at that time. She denies headache, blurry vision, focal weakness, chest pain or SOB.     In the ED her arm X ray showed displaced fracture of distal bones radial and ulnar. Cast was placed. Patient was admitted to the hospital, neurology consulted, recommended to increase her Keppra to 500mg BiD then 750mg BID. Patient underwent Brain MRI, images were partial but didn't show abnormalities. neurology cleared the patient for discharge and follow up with her neurologist in two weeks.         PHYSICAL EXAM:    GENERAL: NAD, well-developed    HEAD:  Atraumatic, Normocephalic    EYES: EOMI, PERRLA, conjunctiva and sclera clear    NECK: Supple, No JVD    CHEST/LUNG: Clear to auscultation bilaterally; No wheeze    HEART: Regular rate and rhythm; No murmurs, rubs, or gallops    ABDOMEN: Soft, Nontender, Nondistended; Bowel sounds present    EXTREMITIES:  2+ Peripheral Pulses, No clubbing, LUE in cast.     PSYCH: AAOx3    NEUROLOGY: non-focal    SKIN: No rashes or lesions        A/P:     Fall and Syncope: likely Seizure episode    Head CT was negative    Neurology recommended to increase Keppra to 750mg BID and Continue Depakote.     EEG. Brain MRI            Left Arm fracture: distal Ulnar and radial bone.     s/p cast    Orthopedic follow up outpatient next week for possible surgery.

## 2019-06-01 NOTE — PROGRESS NOTE ADULT - ASSESSMENT
55 yo F with PMHx of bipolar disorder and seizure disorder, last seizures was last week and she was admitted to [facility] on 04/03 for seizure episode, being evaluated for syncopal event yesterday likely secondary to seizure.    #) Syncope secondary to Seizure  -EEG positive for Epileptiform activity R hemisphere  -Stat Keppra 750mg IV  -Keppra 750mg BID  -Depakote 500mg BID  - Keep Mg > 2   - Aspiration, fall and seizure precaution  -Echo EF 66% normal LV function  - Orthostatics negative  -MRI Brain performed but exam not complete due to back pain  -f/u Neuro Rec    #) Traumatic L arm Radial and Ulnar Fractures   - L Arm XR showed displaced fracture of ulnar/radial diaphyses   - HETAL NWB   - Orthopedics: No emergent surgical intervention needed. She will need an outpatient appointment with Dr. Ramos for surgery planning for next week.   - Pain Control: Tylenol 650 mg Q6H PRN Mild  - Arm elevation and splint care     #) Bipolar Disorder  - Continue with Olanzapine 15 mg BID     #) DVT ppx: Heparin sq  #) GI ppx: PO Protonix 40 mg QD  #) Diet: Regular, aspiration precaution  #) Activity: Ambulate as tolerated, fall risk, seizure risk  #) Dispo: From Home, lives with a roommate   #) Full Code

## 2019-06-02 LAB — LEVETIRACETAM SERPL-MCNC: 7.6 MCG/ML — LOW (ref 12–46)

## 2019-06-05 DIAGNOSIS — S52.92XA UNSPECIFIED FRACTURE OF LEFT FOREARM, INITIAL ENCOUNTER FOR CLOSED FRACTURE: ICD-10-CM
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
## 2019-06-05 DIAGNOSIS — G40.909 EPILEPSY, UNSPECIFIED, NOT INTRACTABLE, WITHOUT STATUS EPILEPTICUS: ICD-10-CM
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
## 2019-06-05 DIAGNOSIS — S52.202A UNSPECIFIED FRACTURE OF SHAFT OF LEFT ULNA, INITIAL ENCOUNTER FOR CLOSED FRACTURE: ICD-10-CM

## 2019-06-05 DIAGNOSIS — F31.9 BIPOLAR DISORDER, UNSPECIFIED: ICD-10-CM

## 2019-07-01 ENCOUNTER — OUTPATIENT (OUTPATIENT)
Dept: OUTPATIENT SERVICES | Facility: HOSPITAL | Age: 54
LOS: 1 days | Discharge: HOME | End: 2019-07-01

## 2019-07-01 ENCOUNTER — APPOINTMENT (OUTPATIENT)
Dept: INTERNAL MEDICINE | Facility: CLINIC | Age: 54
End: 2019-07-01

## 2019-07-01 VITALS
HEART RATE: 77 BPM | WEIGHT: 252 LBS | BODY MASS INDEX: 40.5 KG/M2 | SYSTOLIC BLOOD PRESSURE: 93 MMHG | DIASTOLIC BLOOD PRESSURE: 65 MMHG | HEIGHT: 66 IN | TEMPERATURE: 96 F

## 2019-07-01 NOTE — HISTORY OF PRESENT ILLNESS
[FreeTextEntry1] : follow up \par S/p fall and uncontrolled seizure  [de-identified] : 54 year old female patient with PMH of Bipolar disorder and seizure. \par patient was admitted on May 30th after mechanical fall and left arm fracture, cast was placed and she is NWB on Lt arm, with no plan for surgical intervention per ortho note. \nick was admitted as she has history of uncontrolled seizure with one episode  a week before that event. \nick was seen by neurology who recommended increase the dose of Kepppra and to continue Depakote ( patient already on it for mood stabilization ). \nick was discharged June 1st / 2019, had no events since then, compliant with her medications. \nick Had sebaceous cyst removal recently by surgery, continued abx course with no complications. \par ROS is otherwise negative.

## 2019-07-01 NOTE — ASSESSMENT
[FreeTextEntry1] : 54 yof, history of seizure and bipolar disorder, presents for follow up following admission for uncontrolled seizure and arm fracture. \par \par # Lt arm fracture following mechanical fall \par - NWB left arm \par - follow up with ortho\par \par # Uncontrolled seizure\par - no events since dose adjustment June 1st \par - c/w with current dose of Keppra and Depakote +\par \par # Bipolar \par - c/w Olanzapine and Depakote \par \par # PreDM and Hyperlipidemia \par - repeat HbA1c and lipid profile \par \par # Sebaceous cyst removal \par - no complications \par - follow up with surgery as needed \par \par # HCM \par - colonoscopy 8/ 2018 with poor prep, will refer to GI for repeat \par - mammography up to date, Due 11/2019 \par - last Pap smear 12/2018 is negative

## 2019-07-01 NOTE — PHYSICAL EXAM

## 2019-07-02 ENCOUNTER — APPOINTMENT (OUTPATIENT)
Dept: NEUROLOGY | Facility: CLINIC | Age: 54
End: 2019-07-02
Payer: COMMERCIAL

## 2019-07-02 ENCOUNTER — OUTPATIENT (OUTPATIENT)
Dept: OUTPATIENT SERVICES | Facility: HOSPITAL | Age: 54
LOS: 1 days | Discharge: HOME | End: 2019-07-02

## 2019-07-02 ENCOUNTER — APPOINTMENT (OUTPATIENT)
Dept: INTERNAL MEDICINE | Facility: CLINIC | Age: 54
End: 2019-07-02

## 2019-07-02 VITALS — HEART RATE: 91 BPM | DIASTOLIC BLOOD PRESSURE: 80 MMHG | SYSTOLIC BLOOD PRESSURE: 113 MMHG

## 2019-07-02 DIAGNOSIS — G40.909 EPILEPSY, UNSPECIFIED, NOT INTRACTABLE, WITHOUT STATUS EPILEPTICUS: ICD-10-CM

## 2019-07-02 PROCEDURE — 99213 OFFICE O/P EST LOW 20 MIN: CPT | Mod: NC

## 2019-07-02 NOTE — END OF VISIT
[] : Resident [FreeTextEntry3] : Patient needs trough levels and increase keppra to 1000 mg bid if low.  Option to increase depakote to 750 mg bid if needed.\par Discussed no tub baths.  She does not drive.

## 2019-07-02 NOTE — HISTORY OF PRESENT ILLNESS
[Right Handed] : right handed [Postictal Confusion and Lethargy] : postictal confusion and lethargy [___ per month] : [unfilled] times per month [] : yes [Divalproex (Depakote)] : divalproex (Depakote) [Levetiracetam (Keppra)] : levetiracetam (Keppra) [Family History of Seizures] : no family history of seizures [Lesional] : nonlesional [Head Trauma] : no head trauma [Stroke] : no stroke  [FreeTextEntry1] : 51 years [FreeTextEntry2] : syncope,  [FreeTextEntry4] : ambulation, conversation, pt will suddenly syncopize [FreeTextEntry6] : 2-3 minutes [de-identified] : 5/31 [de-identified] : HETAL ge

## 2019-07-02 NOTE — PHYSICAL EXAM
[Cranial Nerves Optic (II)] : visual acuity intact bilaterally,  visual fields full to confrontation, pupils equal round and reactive to light [Cranial Nerves Oculomotor (III)] : extraocular motion intact [Cranial Nerves Accessory (XI - Cranial And Spinal)] : head turning and shoulder shrug symmetric [1+] : Biceps right 1+ [Motor Handedness Right-Handed] : the patient is right hand dominant [PERRL With Normal Accommodation] : pupils were equal in size, round, reactive to light, with normal accommodation [Extraocular Movements] : extraocular movements were intact [FreeTextEntry5] : abnormal facial strength, dec R, tongue deviation to the right  [FreeTextEntry9] : left upper extremity in cast, cannot asess

## 2019-07-02 NOTE — ASSESSMENT
[FreeTextEntry1] : 53 F with seizure disorder not well controlled w/ rcent hospitalization for seizure s/p syncope with LUE fx\par - keppra 750 mg bid from 250 mg bid, depakote 500 mg xr 2 tabs daily\par - 5/31 keppra level 7.6, depakote 38\par - EEG in patient demonstrated interictal events\par - MRI w/ and w/o con limited study, however appeared normal \par - recheck levels today\par - RTC 3 months

## 2019-07-03 DIAGNOSIS — R73.03 PREDIABETES: ICD-10-CM

## 2019-07-03 DIAGNOSIS — E66.01 MORBID (SEVERE) OBESITY DUE TO EXCESS CALORIES: ICD-10-CM

## 2019-07-03 DIAGNOSIS — G40.909 EPILEPSY, UNSPECIFIED, NOT INTRACTABLE, WITHOUT STATUS EPILEPTICUS: ICD-10-CM

## 2019-07-03 DIAGNOSIS — T14.8XXA OTHER INJURY OF UNSPECIFIED BODY REGION, INITIAL ENCOUNTER: ICD-10-CM

## 2019-07-03 DIAGNOSIS — E66.9 OBESITY, UNSPECIFIED: ICD-10-CM

## 2019-07-16 ENCOUNTER — EMERGENCY (EMERGENCY)
Facility: HOSPITAL | Age: 54
LOS: 0 days | Discharge: HOME | End: 2019-07-16
Admitting: EMERGENCY MEDICINE
Payer: MEDICARE

## 2019-07-16 VITALS
TEMPERATURE: 98 F | OXYGEN SATURATION: 98 % | SYSTOLIC BLOOD PRESSURE: 135 MMHG | HEART RATE: 118 BPM | DIASTOLIC BLOOD PRESSURE: 88 MMHG | RESPIRATION RATE: 20 BRPM

## 2019-07-16 VITALS — HEART RATE: 86 BPM

## 2019-07-16 DIAGNOSIS — S52.202D UNSPECIFIED FRACTURE OF SHAFT OF LEFT ULNA, SUBSEQUENT ENCOUNTER FOR CLOSED FRACTURE WITH ROUTINE HEALING: ICD-10-CM

## 2019-07-16 DIAGNOSIS — S52.302D UNSPECIFIED FRACTURE OF SHAFT OF LEFT RADIUS, SUBSEQUENT ENCOUNTER FOR CLOSED FRACTURE WITH ROUTINE HEALING: ICD-10-CM

## 2019-07-16 DIAGNOSIS — Z91.018 ALLERGY TO OTHER FOODS: ICD-10-CM

## 2019-07-16 DIAGNOSIS — Z79.899 OTHER LONG TERM (CURRENT) DRUG THERAPY: ICD-10-CM
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
## 2019-07-16 DIAGNOSIS — Z79.1 LONG TERM (CURRENT) USE OF NON-STEROIDAL ANTI-INFLAMMATORIES (NSAID): ICD-10-CM

## 2019-07-16 PROCEDURE — 73090 X-RAY EXAM OF FOREARM: CPT | Mod: 26,LT

## 2019-07-16 PROCEDURE — 99283 EMERGENCY DEPT VISIT LOW MDM: CPT

## 2019-07-16 NOTE — ED PROVIDER NOTE - OBJECTIVE STATEMENT
Pt fractured left forearm on 5/31. Was seen here and splinted. Pt never Fu with ortho. Pt still has splint in place and got it wet showering and is requesting a new ACE wrap. Patient denies pain or weakness or numbness. Pt has a displaced mid shaft radius and ulna fx.

## 2019-07-16 NOTE — ED PROVIDER NOTE - PHYSICAL EXAMINATION
CONST: Well appearing in NAD  EYES: PERRL, EOMI, Sclera and conjunctiva clear.   ENT: No nasal discharge. TM's clear B/L without drainage. Oropharynx normal appearing, no erythema or exudates. Uvula midline.  NECK: Non-tender, no meningeal signs  CARD: Normal S1 S2; Normal rate and rhythm  MS: LUE with a sugar tong splint in place. FROM and normal sensation and good cap refill to fingers. No midline spinal tenderness.  SKIN: Warm, dry, no acute rashes. Good turgor  NEURO: A&Ox3, No focal deficits. Strength 5/5 with no sensory deficits. Steady gait

## 2019-07-16 NOTE — ED PROVIDER NOTE - NSFOLLOWUPCLINICS_GEN_ALL_ED_FT
[facility]  Orthpedic  [address], [place]   Phone: [phone]  Fax:   Follow Up Time: 1-3 Days

## 2019-07-16 NOTE — ED ADULT TRIAGE NOTE - CHIEF COMPLAINT QUOTE
as per patient "I need my ace wrap changed" pt broke her arm after having a seizure a couple of weeks ago, no other complaints noted by patient

## 2019-07-16 NOTE — ED PROVIDER NOTE - PROGRESS NOTE DETAILS
repeat xrays show worsening displacement of fxs. case d/w ortho resident and advised that pt should go to ortho clinic tomorrow a walk in. I discussed this plan with the pt and stressed the importance to FU and need for surgery. Pt understands and said she will go to ortho clinic tomorrow

## 2019-07-16 NOTE — ED PROVIDER NOTE - CLINICAL SUMMARY MEDICAL DECISION MAKING FREE TEXT BOX
Pt with forearm fx 7 weeks old. Case d/w ortho. Pt will FU with ortho clinic tomorrow. I have discussed the discharge plan with the patient. The patient agrees with the plan, as discussed.  The patient understands Emergency Department diagnosis is a preliminary diagnosis often based on limited information and that the patient must adhere to the follow-up plan as discussed.  The patient understands that if the symptoms worsen or if prescribed medications do not have the desired/planned effect that the patient may return to the Emergency Department at any time for further evaluation and treatment.

## 2019-07-17 ENCOUNTER — APPOINTMENT (OUTPATIENT)
Dept: ORTHOPEDIC SURGERY | Facility: CLINIC | Age: 54
End: 2019-07-17

## 2019-07-17 ENCOUNTER — OUTPATIENT (OUTPATIENT)
Dept: OUTPATIENT SERVICES | Facility: HOSPITAL | Age: 54
LOS: 1 days | Discharge: HOME | End: 2019-07-17

## 2019-07-31 ENCOUNTER — OUTPATIENT (OUTPATIENT)
Dept: OUTPATIENT SERVICES | Facility: HOSPITAL | Age: 54
LOS: 1 days | Discharge: HOME | End: 2019-07-31
Payer: MEDICAID

## 2019-07-31 VITALS
HEIGHT: 66 IN | DIASTOLIC BLOOD PRESSURE: 81 MMHG | WEIGHT: 244.27 LBS | RESPIRATION RATE: 18 BRPM | TEMPERATURE: 98 F | SYSTOLIC BLOOD PRESSURE: 133 MMHG | OXYGEN SATURATION: 98 % | HEART RATE: 83 BPM

## 2019-07-31 DIAGNOSIS — S52.602A UNSPECIFIED FRACTURE OF LOWER END OF LEFT ULNA, INITIAL ENCOUNTER FOR CLOSED FRACTURE: ICD-10-CM

## 2019-07-31 DIAGNOSIS — Z01.818 ENCOUNTER FOR OTHER PREPROCEDURAL EXAMINATION: ICD-10-CM

## 2019-07-31 DIAGNOSIS — Z98.890 OTHER SPECIFIED POSTPROCEDURAL STATES: Chronic | ICD-10-CM

## 2019-07-31 LAB
ALBUMIN SERPL ELPH-MCNC: 4.7 G/DL — SIGNIFICANT CHANGE UP (ref 3.5–5.2)
ALP SERPL-CCNC: 98 U/L — SIGNIFICANT CHANGE UP (ref 30–115)
ALT FLD-CCNC: 16 U/L — SIGNIFICANT CHANGE UP (ref 0–41)
ANION GAP SERPL CALC-SCNC: 14 MMOL/L — SIGNIFICANT CHANGE UP (ref 7–14)
APPEARANCE UR: CLEAR — SIGNIFICANT CHANGE UP
APTT BLD: 44.6 SEC — HIGH (ref 27–39.2)
AST SERPL-CCNC: 15 U/L — SIGNIFICANT CHANGE UP (ref 0–41)
BACTERIA # UR AUTO: ABNORMAL
BASOPHILS # BLD AUTO: 0.02 K/UL — SIGNIFICANT CHANGE UP (ref 0–0.2)
BASOPHILS NFR BLD AUTO: 0.3 % — SIGNIFICANT CHANGE UP (ref 0–1)
BILIRUB SERPL-MCNC: 0.6 MG/DL — SIGNIFICANT CHANGE UP (ref 0.2–1.2)
BILIRUB UR-MCNC: NEGATIVE — SIGNIFICANT CHANGE UP
BUN SERPL-MCNC: 16 MG/DL — SIGNIFICANT CHANGE UP (ref 10–20)
CALCIUM SERPL-MCNC: 10.3 MG/DL — HIGH (ref 8.5–10.1)
CHLORIDE SERPL-SCNC: 102 MMOL/L — SIGNIFICANT CHANGE UP (ref 98–110)
CO2 SERPL-SCNC: 30 MMOL/L — SIGNIFICANT CHANGE UP (ref 17–32)
COLOR SPEC: YELLOW — SIGNIFICANT CHANGE UP
CREAT SERPL-MCNC: 0.9 MG/DL — SIGNIFICANT CHANGE UP (ref 0.7–1.5)
DIFF PNL FLD: ABNORMAL
EOSINOPHIL # BLD AUTO: 0.15 K/UL — SIGNIFICANT CHANGE UP (ref 0–0.7)
EOSINOPHIL NFR BLD AUTO: 2 % — SIGNIFICANT CHANGE UP (ref 0–8)
EPI CELLS # UR: 4 /HPF — SIGNIFICANT CHANGE UP (ref 0–5)
GLUCOSE SERPL-MCNC: 83 MG/DL — SIGNIFICANT CHANGE UP (ref 70–99)
GLUCOSE UR QL: NEGATIVE — SIGNIFICANT CHANGE UP
HCT VFR BLD CALC: 44.8 % — SIGNIFICANT CHANGE UP (ref 37–47)
HGB BLD-MCNC: 14.4 G/DL — SIGNIFICANT CHANGE UP (ref 12–16)
HYALINE CASTS # UR AUTO: 2 /LPF — SIGNIFICANT CHANGE UP (ref 0–7)
IMM GRANULOCYTES NFR BLD AUTO: 0.3 % — SIGNIFICANT CHANGE UP (ref 0.1–0.3)
INR BLD: 1.01 RATIO — SIGNIFICANT CHANGE UP (ref 0.65–1.3)
KETONES UR-MCNC: SIGNIFICANT CHANGE UP
LEUKOCYTE ESTERASE UR-ACNC: NEGATIVE — SIGNIFICANT CHANGE UP
LYMPHOCYTES # BLD AUTO: 3.29 K/UL — SIGNIFICANT CHANGE UP (ref 1.2–3.4)
LYMPHOCYTES # BLD AUTO: 43.3 % — SIGNIFICANT CHANGE UP (ref 20.5–51.1)
MCHC RBC-ENTMCNC: 30.6 PG — SIGNIFICANT CHANGE UP (ref 27–31)
MCHC RBC-ENTMCNC: 32.1 G/DL — SIGNIFICANT CHANGE UP (ref 32–37)
MCV RBC AUTO: 95.3 FL — SIGNIFICANT CHANGE UP (ref 81–99)
MONOCYTES # BLD AUTO: 0.55 K/UL — SIGNIFICANT CHANGE UP (ref 0.1–0.6)
MONOCYTES NFR BLD AUTO: 7.2 % — SIGNIFICANT CHANGE UP (ref 1.7–9.3)
NEUTROPHILS # BLD AUTO: 3.56 K/UL — SIGNIFICANT CHANGE UP (ref 1.4–6.5)
NEUTROPHILS NFR BLD AUTO: 46.9 % — SIGNIFICANT CHANGE UP (ref 42.2–75.2)
NITRITE UR-MCNC: NEGATIVE — SIGNIFICANT CHANGE UP
NRBC # BLD: 0 /100 WBCS — SIGNIFICANT CHANGE UP (ref 0–0)
PH UR: 6 — SIGNIFICANT CHANGE UP (ref 5–8)
PLATELET # BLD AUTO: 243 K/UL — SIGNIFICANT CHANGE UP (ref 130–400)
POTASSIUM SERPL-MCNC: 4.6 MMOL/L — SIGNIFICANT CHANGE UP (ref 3.5–5)
POTASSIUM SERPL-SCNC: 4.6 MMOL/L — SIGNIFICANT CHANGE UP (ref 3.5–5)
PROT SERPL-MCNC: 8.4 G/DL — HIGH (ref 6–8)
PROT UR-MCNC: ABNORMAL
PROTHROM AB SERPL-ACNC: 11.6 SEC — SIGNIFICANT CHANGE UP (ref 9.95–12.87)
RBC # BLD: 4.7 M/UL — SIGNIFICANT CHANGE UP (ref 4.2–5.4)
RBC # FLD: 13.9 % — SIGNIFICANT CHANGE UP (ref 11.5–14.5)
RBC CASTS # UR COMP ASSIST: 2 /HPF — SIGNIFICANT CHANGE UP (ref 0–4)
SODIUM SERPL-SCNC: 146 MMOL/L — SIGNIFICANT CHANGE UP (ref 135–146)
SP GR SPEC: 1.03 — HIGH (ref 1.01–1.02)
UROBILINOGEN FLD QL: ABNORMAL
WBC # BLD: 7.59 K/UL — SIGNIFICANT CHANGE UP (ref 4.8–10.8)
WBC # FLD AUTO: 7.59 K/UL — SIGNIFICANT CHANGE UP (ref 4.8–10.8)
WBC UR QL: 3 /HPF — SIGNIFICANT CHANGE UP (ref 0–5)

## 2019-07-31 PROCEDURE — 93010 ELECTROCARDIOGRAM REPORT: CPT

## 2019-07-31 NOTE — H&P PST ADULT - HISTORY OF PRESENT ILLNESS
CURRENTLY  DENIES ANY CP, SOB,PALPITATIONS,COUGH OR DYSURIA  EXERCISE TOLERANCE 1-2 FOS WITHOUT SOB      AS PER PATIENT  this is his/her complete medical history including medications - PRESCRIPTIONS  OVER THE COUNTER MEDS

## 2019-07-31 NOTE — H&P PST ADULT - REASON FOR ADMISSION
55 Y/O F SCHEDULED FOR PAST FOR OPEN REDUCTION INTERNAL FIXATION LEFT DISTAL RADIUS AND ULNA ON 8/5/19- S/P FALL FROM SEIZURE

## 2019-07-31 NOTE — H&P PST ADULT - NSICDXPASTMEDICALHX_GEN_ALL_CORE_FT
PAST MEDICAL HISTORY:  Bipolar 1 disorder     Depression DENIES SUICIDAL IDEATION    DVT (deep venous thrombosis)     Seizures LAST SZ 5/30/19

## 2019-08-05 PROBLEM — F32.9 MAJOR DEPRESSIVE DISORDER, SINGLE EPISODE, UNSPECIFIED: Chronic | Status: ACTIVE | Noted: 2019-07-31

## 2019-08-06 ENCOUNTER — APPOINTMENT (OUTPATIENT)
Dept: NEUROLOGY | Facility: CLINIC | Age: 54
End: 2019-08-06
Payer: MEDICARE

## 2019-08-06 ENCOUNTER — OUTPATIENT (OUTPATIENT)
Dept: OUTPATIENT SERVICES | Facility: HOSPITAL | Age: 54
LOS: 1 days | Discharge: HOME | End: 2019-08-06

## 2019-08-06 VITALS
WEIGHT: 246 LBS | HEART RATE: 72 BPM | SYSTOLIC BLOOD PRESSURE: 117 MMHG | BODY MASS INDEX: 39.53 KG/M2 | HEIGHT: 66 IN | DIASTOLIC BLOOD PRESSURE: 82 MMHG

## 2019-08-06 DIAGNOSIS — Z98.890 OTHER SPECIFIED POSTPROCEDURAL STATES: Chronic | ICD-10-CM

## 2019-08-06 LAB
CHOLEST SERPL-MCNC: 220 MG/DL
CHOLEST/HDLC SERPL: 3.3 RATIO
ESTIMATED AVERAGE GLUCOSE: 114 MG/DL
HBA1C MFR BLD HPLC: 5.6 %
HDLC SERPL-MCNC: 66 MG/DL
LDLC SERPL CALC-MCNC: 148 MG/DL
TRIGL SERPL-MCNC: 114 MG/DL

## 2019-08-06 PROCEDURE — 99213 OFFICE O/P EST LOW 20 MIN: CPT | Mod: NC

## 2019-08-06 NOTE — PHYSICAL EXAM
[FreeTextEntry1] : Neurological Exam: \par Mental status: Awake, alert and oriented x3.  Recent and remote memory intact.  Naming, repetition and comprehension intact.  Attention/concentration intact.  No dysarthria, no aphasia.  Fund of knowledge appropriate.  \par Cranial nerves: Pupils equally round and reactive to light, visual fields full, no nystagmus, extraocular muscles intact, V1 through V3 intact bilaterally and symmetric, face symmetric, hearing intact to finger rub, palate elevation symmetric, tongue was midline.\par Motor:  MRC grading 5/5 b/l UE/LE.   strength 5/5.  Normal tone and bulk.  No abnormal movements.  \par Sensation: Intact to light touch, proprioception, and pinprick. \par Coordination: No dysmetria on finger-to-nose and heel-to-shin.  No dysdiadokinesia.\par Reflexes: 2+ in bilateral UE/LE, downgoing toes bilaterally. (-) Palencia.\par Gait: Narrow and steady. No ataxia.  Romberg negative\par \par

## 2019-08-06 NOTE — ASSESSMENT
[FreeTextEntry1] : 55 yo female with sz d/o controlled on keppra 750 mg q 12\par cleared from neuro stanpt for ORIF\par pleae cont keppra 750 mg q 12. if needed can convert to IV 1:1\par RTC AT SCHEDULED APPT

## 2019-08-06 NOTE — HISTORY OF PRESENT ILLNESS
[FreeTextEntry1] : 55 yo female with sz d/o since age 53.  last sz was in may 31 when she broke her arm. doing well now on keppra 750 q 12 now\par no further sz\par here for clearance for ORIF.\par patient was on 250 q 12 and increased to 750 q 12 in may

## 2019-08-08 ENCOUNTER — APPOINTMENT (OUTPATIENT)
Dept: INTERNAL MEDICINE | Facility: CLINIC | Age: 54
End: 2019-08-08

## 2019-08-08 ENCOUNTER — OUTPATIENT (OUTPATIENT)
Dept: OUTPATIENT SERVICES | Facility: HOSPITAL | Age: 54
LOS: 1 days | Discharge: HOME | End: 2019-08-08

## 2019-08-08 VITALS
HEIGHT: 66 IN | TEMPERATURE: 97.8 F | BODY MASS INDEX: 39.86 KG/M2 | HEART RATE: 85 BPM | DIASTOLIC BLOOD PRESSURE: 77 MMHG | WEIGHT: 248 LBS | SYSTOLIC BLOOD PRESSURE: 107 MMHG

## 2019-08-08 DIAGNOSIS — Z01.818 ENCOUNTER FOR OTHER PREPROCEDURAL EXAMINATION: ICD-10-CM

## 2019-08-08 DIAGNOSIS — Z98.890 OTHER SPECIFIED POSTPROCEDURAL STATES: Chronic | ICD-10-CM

## 2019-08-08 NOTE — HISTORY OF PRESENT ILLNESS
[FreeTextEntry1] : pre op evaluation  [de-identified] : 54 year old female with PMH of bipolar disorder and seizure, \par was hospitalized 2 months ago after seizure, fall and left forearm fracture which was treated conservatively and patient was told to follow up as outpatient. was seen by ortho in office, scheduled for surgery. \par presents today for pre op evaluation, patient is fully functional, walks 6 blocks can take the stairs, no SOB or CP with exertion. \par seizure has been controlled since last admission on current medications \par ROS is negative otherwise.

## 2019-08-08 NOTE — ASSESSMENT
[FreeTextEntry1] : 54 year old female with history of seizure and bipolar disorder\par \par # Lt arm fracture, preop evaluation, surgery next week \par - METS > 4, RCRI class 1\par - low risk for moderate risk surgery \par - EKG no ischemic changes \par \par # Seizures, controlled \par - c/w same management\par \par # Bipolar, controlled \par - f/u withy psych \par \par # Pre DM, well controlled \par - last A1c 5.6 on august 2019  \par \par # HCM \par - colonoscopy 2018; poor prep, has an appointment for repeat \par - mammography Due 11/2019 \par - last PAP smear 12/2018 is negative.\par - follow up in 4 months

## 2019-08-08 NOTE — PHYSICAL EXAM
[No Acute Distress] : no acute distress [Well Developed] : well developed [Well Nourished] : well nourished [Well-Appearing] : well-appearing [Normal Sclera/Conjunctiva] : normal sclera/conjunctiva [PERRL] : pupils equal round and reactive to light [EOMI] : extraocular movements intact [Normal Outer Ear/Nose] : the outer ears and nose were normal in appearance [Normal Oropharynx] : the oropharynx was normal [No JVD] : no jugular venous distention [No Lymphadenopathy] : no lymphadenopathy [Supple] : supple [No Accessory Muscle Use] : no accessory muscle use [Thyroid Normal, No Nodules] : the thyroid was normal and there were no nodules present [No Respiratory Distress] : no respiratory distress  [Normal Rate] : normal rate  [Clear to Auscultation] : lungs were clear to auscultation bilaterally [Regular Rhythm] : with a regular rhythm [Normal S1, S2] : normal S1 and S2 [No Murmur] : no murmur heard [No Carotid Bruits] : no carotid bruits [No Abdominal Bruit] : a ~M bruit was not heard ~T in the abdomen [No Varicosities] : no varicosities [No Edema] : there was no peripheral edema [Pedal Pulses Present] : the pedal pulses are present [No Palpable Aorta] : no palpable aorta [No Extremity Clubbing/Cyanosis] : no extremity clubbing/cyanosis [Non-distended] : non-distended [Non Tender] : non-tender [Soft] : abdomen soft [No HSM] : no HSM [No Masses] : no abdominal mass palpated [Normal Bowel Sounds] : normal bowel sounds [Normal Posterior Cervical Nodes] : no posterior cervical lymphadenopathy [Normal Anterior Cervical Nodes] : no anterior cervical lymphadenopathy [No Spinal Tenderness] : no spinal tenderness [No CVA Tenderness] : no CVA  tenderness [No Rash] : no rash [Coordination Grossly Intact] : coordination grossly intact [Deep Tendon Reflexes (DTR)] : deep tendon reflexes were 2+ and symmetric [No Focal Deficits] : no focal deficits [Normal Gait] : normal gait [Normal Insight/Judgement] : insight and judgment were intact [Normal Affect] : the affect was normal [de-identified] : cast left forearm

## 2019-08-11 ENCOUNTER — EMERGENCY (EMERGENCY)
Facility: HOSPITAL | Age: 54
LOS: 0 days | Discharge: HOME | End: 2019-08-11
Admitting: EMERGENCY MEDICINE
Payer: MEDICARE

## 2019-08-11 VITALS
DIASTOLIC BLOOD PRESSURE: 74 MMHG | SYSTOLIC BLOOD PRESSURE: 111 MMHG | OXYGEN SATURATION: 99 % | HEART RATE: 96 BPM | TEMPERATURE: 98 F | RESPIRATION RATE: 19 BRPM
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
## 2019-08-11 DIAGNOSIS — S52.302D UNSPECIFIED FRACTURE OF SHAFT OF LEFT RADIUS, SUBSEQUENT ENCOUNTER FOR CLOSED FRACTURE WITH ROUTINE HEALING: ICD-10-CM
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
## 2019-08-11 DIAGNOSIS — S52.202D UNSPECIFIED FRACTURE OF SHAFT OF LEFT ULNA, SUBSEQUENT ENCOUNTER FOR CLOSED FRACTURE WITH ROUTINE HEALING: ICD-10-CM
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
## 2019-08-11 DIAGNOSIS — Z91.018 ALLERGY TO OTHER FOODS: ICD-10-CM
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
## 2019-08-11 DIAGNOSIS — Z98.890 OTHER SPECIFIED POSTPROCEDURAL STATES: Chronic | ICD-10-CM

## 2019-08-11 PROCEDURE — 99284 EMERGENCY DEPT VISIT MOD MDM: CPT | Mod: 25

## 2019-08-11 PROCEDURE — 29125 APPL SHORT ARM SPLINT STATIC: CPT

## 2019-08-11 NOTE — ED PROVIDER NOTE - PHYSICAL EXAMINATION
GENERAL:  well appearing, non-toxic female in no acute distress  SKIN: skin warm, pink and dry. MMM. + swelling to left arm, cap refill < 2 sec   PULM: CTAB. Normal respiratory effort. No respiratory distress. No wheezes, stridor, rales or rhonchi. No retractions  CV: RRR, no M/R/G.   MSK: + TTP left mid forearm with mild swelling. Decreased ROM of left wrist. FROM of left elbow. No edema, erythema, cyanosis. radial pulses equal and intact bilaterally.   NEURO: A+Ox3, sensation of bilateral UE equal and intact.   PSYCH: appropriate behavior, cooperative.

## 2019-08-11 NOTE — ED PROVIDER NOTE - OBJECTIVE STATEMENT
55 yo female with h/o bipolar disorder, seizures presents to the ED stating her cast fell off today while putting her shirt on. Patient had a displaced shaft radial and ulna fracture on 5/31 s/p fall. Patient had delayed ortho follow up initially but states shes suppose to have surgery next week. No additional injury or trauma. Denies fever, chills, UE weakness or paresthesias.

## 2019-08-11 NOTE — ED PROVIDER NOTE - CLINICAL SUMMARY MEDICAL DECISION MAKING FREE TEXT BOX
Patient with recent hx of mid shaft radial/ulna fracture, here s/p her casting coming off her left arm. Patient is scheduled for surgery this week. Patient resplinted, recommend ortho follow up as scheduled.

## 2019-08-11 NOTE — ED PROVIDER NOTE - CARE PROVIDER_API CALL
Karma Ramos (MD)  Surgery of the Hand  [facility] [place] [address]  Phone: [phone]  Fax: [phone]  Follow Up Time:

## 2019-08-11 NOTE — ED ADULT NURSE NOTE - PMH
Bipolar 1 disorder    Depression  DENIES SUICIDAL IDEATION  DVT (deep venous thrombosis)    Seizures  LAST SZ 5/30/19

## 2019-08-11 NOTE — ED PROVIDER NOTE - NSFOLLOWUPCLINICS_GEN_ALL_ED_FT
[facility]  Orthpedic  [address], [place]   Phone: [phone]  Fax:   Follow Up Time: 1-3 Days

## 2019-08-12 DIAGNOSIS — E66.01 MORBID (SEVERE) OBESITY DUE TO EXCESS CALORIES: ICD-10-CM

## 2019-08-12 DIAGNOSIS — Z01.818 ENCOUNTER FOR OTHER PREPROCEDURAL EXAMINATION: ICD-10-CM

## 2019-08-12 DIAGNOSIS — G40.909 EPILEPSY, UNSPECIFIED, NOT INTRACTABLE, WITHOUT STATUS EPILEPTICUS: ICD-10-CM

## 2019-08-12 DIAGNOSIS — R73.03 PREDIABETES: ICD-10-CM

## 2019-08-12 DIAGNOSIS — T14.8XXA OTHER INJURY OF UNSPECIFIED BODY REGION, INITIAL ENCOUNTER: ICD-10-CM

## 2019-08-12 DIAGNOSIS — F31.9 BIPOLAR DISORDER, UNSPECIFIED: ICD-10-CM

## 2019-08-15 ENCOUNTER — OUTPATIENT (OUTPATIENT)
Dept: OUTPATIENT SERVICES | Facility: HOSPITAL | Age: 54
LOS: 1 days | Discharge: HOME | End: 2019-08-15

## 2019-08-15 VITALS
DIASTOLIC BLOOD PRESSURE: 75 MMHG | TEMPERATURE: 98 F | SYSTOLIC BLOOD PRESSURE: 124 MMHG | RESPIRATION RATE: 25 BRPM | HEART RATE: 75 BPM | OXYGEN SATURATION: 97 %

## 2019-08-15 VITALS
TEMPERATURE: 98 F | SYSTOLIC BLOOD PRESSURE: 115 MMHG | HEIGHT: 66 IN | RESPIRATION RATE: 20 BRPM | HEART RATE: 69 BPM | OXYGEN SATURATION: 99 % | WEIGHT: 244.27 LBS | DIASTOLIC BLOOD PRESSURE: 57 MMHG

## 2019-08-15 DIAGNOSIS — Z98.890 OTHER SPECIFIED POSTPROCEDURAL STATES: Chronic | ICD-10-CM

## 2019-08-15 RX ORDER — OXYCODONE AND ACETAMINOPHEN 5; 325 MG/1; MG/1
1 TABLET ORAL ONCE
Refills: 0 | Status: DISCONTINUED | OUTPATIENT
Start: 2019-08-15 | End: 2019-08-15

## 2019-08-15 RX ORDER — HYDROMORPHONE HYDROCHLORIDE 2 MG/ML
0.5 INJECTION INTRAMUSCULAR; INTRAVENOUS; SUBCUTANEOUS
Refills: 0 | Status: DISCONTINUED | OUTPATIENT
Start: 2019-08-15 | End: 2019-08-15

## 2019-08-15 RX ORDER — ONDANSETRON 8 MG/1
4 TABLET, FILM COATED ORAL ONCE
Refills: 0 | Status: DISCONTINUED | OUTPATIENT
Start: 2019-08-15 | End: 2019-08-30

## 2019-08-15 RX ORDER — SODIUM CHLORIDE 9 MG/ML
1000 INJECTION, SOLUTION INTRAVENOUS
Refills: 0 | Status: DISCONTINUED | OUTPATIENT
Start: 2019-08-15 | End: 2019-08-30

## 2019-08-15 RX ADMIN — SODIUM CHLORIDE 100 MILLILITER(S): 9 INJECTION, SOLUTION INTRAVENOUS at 14:18

## 2019-08-15 NOTE — ASU DISCHARGE PLAN (ADULT/PEDIATRIC) - PROCEDURE
Open reduction internal fixation of radial and ulnar Open reduction internal fixation of radius and ulna

## 2019-08-15 NOTE — ASU DISCHARGE PLAN (ADULT/PEDIATRIC) - CARE PROVIDER_API CALL
Karma Ramos (MD)  Surgery of the Hand  [address] [place] [address]  Phone: [phone]  Fax: [phone]  Follow Up Time:

## 2019-08-15 NOTE — BRIEF OPERATIVE NOTE - NSICDXBRIEFPOSTOP_GEN_ALL_CORE_FT
POST-OP DIAGNOSIS:  Radial shaft fracture 15-Aug-2019 09:15:28 left Karma Ramos  Ulnar shaft fracture 15-Aug-2019 09:15:22 left Karma Ramos

## 2019-08-15 NOTE — BRIEF OPERATIVE NOTE - NSICDXBRIEFPREOP_GEN_ALL_CORE_FT
PRE-OP DIAGNOSIS:  Ulnar shaft fracture 15-Aug-2019 09:15:11 left Karma Ramos  Radial shaft fracture 15-Aug-2019 09:15:03 left Karma Ramos

## 2019-08-19 DIAGNOSIS — F31.9 BIPOLAR DISORDER, UNSPECIFIED: ICD-10-CM

## 2019-08-19 DIAGNOSIS — S52.92XA UNSPECIFIED FRACTURE OF LEFT FOREARM, INITIAL ENCOUNTER FOR CLOSED FRACTURE: ICD-10-CM
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
## 2019-08-19 DIAGNOSIS — R56.9 UNSPECIFIED CONVULSIONS: ICD-10-CM

## 2019-08-19 DIAGNOSIS — F32.9 MAJOR DEPRESSIVE DISORDER, SINGLE EPISODE, UNSPECIFIED: ICD-10-CM
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
## 2019-08-19 DIAGNOSIS — Z88.0 ALLERGY STATUS TO PENICILLIN: ICD-10-CM

## 2019-08-23 ENCOUNTER — OUTPATIENT (OUTPATIENT)
Dept: OUTPATIENT SERVICES | Facility: HOSPITAL | Age: 54
LOS: 1 days | Discharge: HOME | End: 2019-08-23

## 2019-08-23 ENCOUNTER — APPOINTMENT (OUTPATIENT)
Dept: GASTROENTEROLOGY | Facility: CLINIC | Age: 54
End: 2019-08-23
Payer: MEDICARE

## 2019-08-23 DIAGNOSIS — D12.5 BENIGN NEOPLASM OF SIGMOID COLON: ICD-10-CM

## 2019-08-23 DIAGNOSIS — Z12.11 ENCOUNTER FOR SCREENING FOR MALIGNANT NEOPLASM OF COLON: ICD-10-CM

## 2019-08-23 DIAGNOSIS — K63.5 POLYP OF COLON: ICD-10-CM

## 2019-08-23 DIAGNOSIS — Z98.890 OTHER SPECIFIED POSTPROCEDURAL STATES: Chronic | ICD-10-CM

## 2019-08-23 DIAGNOSIS — Z87.891 PERSONAL HISTORY OF NICOTINE DEPENDENCE: ICD-10-CM

## 2019-08-23 PROCEDURE — 99214 OFFICE O/P EST MOD 30 MIN: CPT

## 2019-08-23 RX ORDER — SULFAMETHOXAZOLE AND TRIMETHOPRIM 800; 160 MG/1; MG/1
800-160 TABLET ORAL TWICE DAILY
Qty: 14 | Refills: 0 | Status: DISCONTINUED | COMMUNITY
Start: 2019-04-12 | End: 2019-08-23

## 2019-08-23 RX ORDER — ZINC OXIDE 13 %
CREAM (GRAM) TOPICAL
Qty: 1 | Refills: 0 | Status: DISCONTINUED | COMMUNITY
Start: 2019-04-12 | End: 2019-08-23

## 2019-08-23 NOTE — PHYSICAL EXAM
[General Appearance - Alert] : alert [General Appearance - In No Acute Distress] : in no acute distress [Sclera] : the sclera and conjunctiva were normal [Neck Cervical Mass (___cm)] : no neck mass was observed [Jugular Venous Distention Increased] : there was no jugular-venous distention [Exaggerated Use Of Accessory Muscles For Inspiration] : no accessory muscle use [Bowel Sounds] : normal bowel sounds [Heart Rate And Rhythm] : heart rate was normal and rhythm regular [Abdomen Soft] : soft [Abdomen Tenderness] : non-tender [] : no hepato-splenomegaly

## 2019-08-23 NOTE — HISTORY OF PRESENT ILLNESS
[Heartburn] : denies heartburn [Nausea] : denies nausea [Vomiting] : denies vomiting [Diarrhea] : denies diarrhea [Constipation] : denies constipation [Abdominal Pain] : denies abdominal pain [de-identified] : 54 year old female with PMH of bipolar disorder, papillary thyroid Ca s/p thyroidectomy, obesity,   and seizure comes to the clinic for repeat screening colonoscopy. \par Pt last had a colonoscopy in 2018 which showed 1 polyp in sigmoid colon between 5-10mm. Polypectomy was not done due to poor prep and pt was asked to reschedule colonoscopy. \par Pt denies any family h/o colon Ca. \par

## 2019-08-23 NOTE — ASSESSMENT
[FreeTextEntry1] : 54 year old female with PMH of bipolar disorder, papillary thyroid Ca s/p thyroidectomy, obesity,   and seizure comes to the clinic for screening colonoscopy. \par Pt last had a colonoscopy in 2018 which showed 1 polyp in sigmoid colon between 5-10mm. Polypectomy was not done due to poor prep and pt was asked to reschedule colonoscopy. \par Pt denies any family h/o colon Ca. \par \par - Will schedule for Colonoscopy\par - Prescribe dulcolax and golytely\par Risks and benefits discussed with patient.\par \par \par

## 2019-08-23 NOTE — REVIEW OF SYSTEMS
[Negative] : Endocrine [Abdominal Pain] : no abdominal pain [Vomiting] : no vomiting [Constipation] : no constipation [Diarrhea] : no diarrhea [Heartburn] : no heartburn [Melena] : no melena

## 2019-08-28 DIAGNOSIS — Z12.11 ENCOUNTER FOR SCREENING FOR MALIGNANT NEOPLASM OF COLON: ICD-10-CM

## 2019-08-28 DIAGNOSIS — K63.5 POLYP OF COLON: ICD-10-CM

## 2019-08-28 DIAGNOSIS — D12.5 BENIGN NEOPLASM OF SIGMOID COLON: ICD-10-CM

## 2019-09-09 LAB
LEVETIRACETAM SERPL-MCNC: 12.8 MCG/ML
VALPROATE SERPL-MCNC: 61 UG/ML

## 2019-11-17 ENCOUNTER — FORM ENCOUNTER (OUTPATIENT)
Age: 54
End: 2019-11-17

## 2019-11-18 ENCOUNTER — OUTPATIENT (OUTPATIENT)
Dept: OUTPATIENT SERVICES | Facility: HOSPITAL | Age: 54
LOS: 1 days | Discharge: HOME | End: 2019-11-18
Payer: MEDICARE

## 2019-11-18 DIAGNOSIS — Z98.890 OTHER SPECIFIED POSTPROCEDURAL STATES: Chronic | ICD-10-CM

## 2019-11-18 DIAGNOSIS — Z12.31 ENCOUNTER FOR SCREENING MAMMOGRAM FOR MALIGNANT NEOPLASM OF BREAST: ICD-10-CM

## 2019-11-18 PROCEDURE — 77067 SCR MAMMO BI INCL CAD: CPT | Mod: 26

## 2019-11-18 PROCEDURE — 77063 BREAST TOMOSYNTHESIS BI: CPT | Mod: 26

## 2019-11-20 ENCOUNTER — TRANSCRIPTION ENCOUNTER (OUTPATIENT)
Age: 54
End: 2019-11-20

## 2019-11-20 ENCOUNTER — OUTPATIENT (OUTPATIENT)
Dept: OUTPATIENT SERVICES | Facility: HOSPITAL | Age: 54
LOS: 1 days | Discharge: HOME | End: 2019-11-20
Payer: MEDICARE

## 2019-11-20 ENCOUNTER — RESULT REVIEW (OUTPATIENT)
Age: 54
End: 2019-11-20

## 2019-11-20 VITALS — HEART RATE: 69 BPM | DIASTOLIC BLOOD PRESSURE: 67 MMHG | SYSTOLIC BLOOD PRESSURE: 116 MMHG | RESPIRATION RATE: 18 BRPM

## 2019-11-20 VITALS
WEIGHT: 255.07 LBS | SYSTOLIC BLOOD PRESSURE: 121 MMHG | HEART RATE: 83 BPM | RESPIRATION RATE: 20 BRPM | HEIGHT: 66 IN | TEMPERATURE: 98 F | DIASTOLIC BLOOD PRESSURE: 82 MMHG

## 2019-11-20 DIAGNOSIS — Z98.890 OTHER SPECIFIED POSTPROCEDURAL STATES: Chronic | ICD-10-CM

## 2019-11-20 PROCEDURE — 45385 COLONOSCOPY W/LESION REMOVAL: CPT

## 2019-11-20 PROCEDURE — 45380 COLONOSCOPY AND BIOPSY: CPT | Mod: 59

## 2019-11-20 PROCEDURE — 88305 TISSUE EXAM BY PATHOLOGIST: CPT | Mod: 26

## 2019-11-20 NOTE — CHART NOTE - NSCHARTNOTEFT_GEN_A_CORE
PACU ANESTHESIA PACU ADMISSION NOTE      Procedure:  Post op diagnosis    ____ Intubated  TV:______       Rate: ______      FiO2: ______    ___x_ Patent Airway    ___x_ Full return of protective reflexes    ____ Full recovery from anesthesia / sedation to baseline status    Viitals:  see anesthesia record            Mental Status:  __x__ Awake   _____ Alert   _____ Drowsy   _____ Sedated    Nausea/Vomiting: ____ Yes, See Post - Op Orders      _x___ No    Pain Scale (0-10): _____    Treatment: ____ None    ____ See Post - Op/PCA Orders    Post - Operative Fluids:   ____ Oral   __x__ See Post - Op Orders    Plan:         Discharge:   x____Home       _____Floor         _____Critical Care    _____Other:_________________    Comments: uneventful perioperative course; no s/s anesthesia complications noted; D/C home when criteria met

## 2019-11-21 LAB — SURGICAL PATHOLOGY STUDY: SIGNIFICANT CHANGE UP

## 2019-11-26 DIAGNOSIS — D12.5 BENIGN NEOPLASM OF SIGMOID COLON: ICD-10-CM

## 2019-11-26 DIAGNOSIS — R56.9 UNSPECIFIED CONVULSIONS: ICD-10-CM

## 2019-11-26 DIAGNOSIS — D12.3 BENIGN NEOPLASM OF TRANSVERSE COLON: ICD-10-CM

## 2019-11-26 DIAGNOSIS — I82.409 ACUTE EMBOLISM AND THROMBOSIS OF UNSPECIFIED DEEP VEINS OF UNSPECIFIED LOWER EXTREMITY: ICD-10-CM

## 2019-11-26 DIAGNOSIS — F31.9 BIPOLAR DISORDER, UNSPECIFIED: ICD-10-CM

## 2019-11-26 DIAGNOSIS — K64.8 OTHER HEMORRHOIDS: ICD-10-CM

## 2019-11-26 DIAGNOSIS — Z91.018 ALLERGY TO OTHER FOODS: ICD-10-CM

## 2019-11-26 DIAGNOSIS — K64.4 RESIDUAL HEMORRHOIDAL SKIN TAGS: ICD-10-CM

## 2019-11-26 DIAGNOSIS — Z86.010 PERSONAL HISTORY OF COLONIC POLYPS: ICD-10-CM

## 2019-11-26 DIAGNOSIS — D12.2 BENIGN NEOPLASM OF ASCENDING COLON: ICD-10-CM

## 2019-11-26 DIAGNOSIS — Z12.11 ENCOUNTER FOR SCREENING FOR MALIGNANT NEOPLASM OF COLON: ICD-10-CM

## 2019-11-26 DIAGNOSIS — D12.4 BENIGN NEOPLASM OF DESCENDING COLON: ICD-10-CM

## 2019-11-26 DIAGNOSIS — F32.9 MAJOR DEPRESSIVE DISORDER, SINGLE EPISODE, UNSPECIFIED: ICD-10-CM

## 2019-11-26 DIAGNOSIS — Z88.0 ALLERGY STATUS TO PENICILLIN: ICD-10-CM

## 2019-12-22 NOTE — ED PROVIDER NOTE - NS HIV RISK FACTOR YES
EXAM:

XR Ankle Rt 3 View



PROVIDED CLINICAL HISTORY:

ORIF



COMPARISON:

12/22/2019 12:32 PM



FINDINGS:

Frontal oblique and lateral spot fluoroscopic images of the right ankle demonstrate interval lateral 
side plate and screw fixation of fibular fracture and cannulated, partially threaded lag screws

traversing medial malleolar fracture.



IMPRESSION:

As above.



Reported By: Tahir Ruby 

Electronically Signed:  12/22/2019 2:48 PM Declined

## 2019-12-23 ENCOUNTER — APPOINTMENT (OUTPATIENT)
Dept: INTERNAL MEDICINE | Facility: CLINIC | Age: 54
End: 2019-12-23
Payer: MEDICARE

## 2019-12-23 ENCOUNTER — OUTPATIENT (OUTPATIENT)
Dept: OUTPATIENT SERVICES | Facility: HOSPITAL | Age: 54
LOS: 1 days | Discharge: HOME | End: 2019-12-23

## 2019-12-23 VITALS
BODY MASS INDEX: 41.62 KG/M2 | HEIGHT: 66 IN | TEMPERATURE: 96.5 F | SYSTOLIC BLOOD PRESSURE: 112 MMHG | WEIGHT: 259 LBS | HEART RATE: 86 BPM | DIASTOLIC BLOOD PRESSURE: 75 MMHG

## 2019-12-23 DIAGNOSIS — Z98.890 OTHER SPECIFIED POSTPROCEDURAL STATES: Chronic | ICD-10-CM

## 2019-12-23 PROCEDURE — 99213 OFFICE O/P EST LOW 20 MIN: CPT | Mod: GC

## 2019-12-23 RX ORDER — POLYETHYLENE GLYCOL 3350 AND ELECTROLYTES WITH LEMON FLAVOR 236; 22.74; 6.74; 5.86; 2.97 G/4L; G/4L; G/4L; G/4L; G/4L
236 POWDER, FOR SOLUTION ORAL
Qty: 1 | Refills: 0 | Status: DISCONTINUED | COMMUNITY
Start: 2019-08-23 | End: 2019-12-23

## 2019-12-23 NOTE — PHYSICAL EXAM
[Well Nourished] : well nourished [No Acute Distress] : no acute distress [No Respiratory Distress] : no respiratory distress  [No Accessory Muscle Use] : no accessory muscle use [Clear to Auscultation] : lungs were clear to auscultation bilaterally [Normal Rate] : normal rate  [Regular Rhythm] : with a regular rhythm [Soft] : abdomen soft [Normal S1, S2] : normal S1 and S2 [Non Tender] : non-tender [Non-distended] : non-distended

## 2019-12-25 NOTE — HISTORY OF PRESENT ILLNESS
[FreeTextEntry1] : Follow-up for seizure disorder [de-identified] : 54 year old female with PMHx of bipolar disorder, seizure disorder, thyroid Ca s/p thyroidectomy, recent fall leading to left forearm fracture presenting for medication renewal. Denies any complaints, no chest pain, shortness of breath, fever, chills, abdominal pain. Since last visit had mammography, colonoscopy done.

## 2019-12-25 NOTE — REVIEW OF SYSTEMS
[Chills] : no chills [Fever] : no fever [Discharge] : no discharge [Pain] : no pain [Earache] : no earache [Night Sweats] : no night sweats [Palpitations] : no palpitations [Chest Pain] : no chest pain [Leg Claudication] : no leg claudication [Abdominal Pain] : no abdominal pain [Shortness Of Breath] : no shortness of breath [Cough] : no cough [Constipation] : no constipation [Nausea] : no nausea [Vomiting] : no vomiting [Incontinence] : no incontinence [Frequency] : no frequency [Dysuria] : no dysuria [Muscle Pain] : no muscle pain [Joint Pain] : no joint pain [Headache] : no headache [Itching] : no itching [Dizziness] : no dizziness [Suicidal] : not suicidal [Insomnia] : no insomnia [Anxiety] : no anxiety [Depression] : no depression

## 2019-12-25 NOTE — ASSESSMENT
[FreeTextEntry1] : 54 year old female with PMHx of bipolar disorder, seizure disorder, thyroid Ca s/p thyroidectomy, recent fall leading to left forearm fracture presenting for medication renewal.\par \par #Seizure disorder\par Off seizures currently\par Continue Keppra, Divalproex\par \par #Bipolar disorder, stable\par On Olanzapine \par Psych f/u\par \par #Recent left ulna/radius fracture s/p ORIF\par Continue physical therapy\par \par #Hx of papillary thyroid Ca, s/p partial thyroidectomy\par Repeat TSH prior to next visit\par \par #HCM\par Mammography 2019 BIRADS 1 \par Colonoscopy 2019 with 10mm adenoma, repeat in 3 years\par Received flu shot this year\par RTC in 6 months with repeat blood work\par

## 2019-12-26 DIAGNOSIS — E66.01 MORBID (SEVERE) OBESITY DUE TO EXCESS CALORIES: ICD-10-CM

## 2019-12-26 DIAGNOSIS — E78.5 HYPERLIPIDEMIA, UNSPECIFIED: ICD-10-CM

## 2019-12-26 DIAGNOSIS — R73.03 PREDIABETES: ICD-10-CM

## 2020-01-17 ENCOUNTER — APPOINTMENT (OUTPATIENT)
Dept: BREAST CENTER | Facility: CLINIC | Age: 55
End: 2020-01-17
Payer: MEDICARE

## 2020-01-17 VITALS
HEIGHT: 66 IN | WEIGHT: 260 LBS | BODY MASS INDEX: 41.78 KG/M2 | TEMPERATURE: 98 F | SYSTOLIC BLOOD PRESSURE: 120 MMHG | DIASTOLIC BLOOD PRESSURE: 80 MMHG

## 2020-01-17 PROCEDURE — 99213 OFFICE O/P EST LOW 20 MIN: CPT

## 2020-01-20 NOTE — DATA REVIEWED
[FreeTextEntry1] : EXAM: MG MAMMO SCREEN W MARIA E BI#\par \par \par PROCEDURE DATE: 11/18/2019\par \par \par \par INTERPRETATION: HISTORY:\par Bilateral MG MAMMO SCREEN W MARIA E BI# was performed. Patient is 54 years old\par and is seen for screening. The patient has no personal history of cancer.\par The patient has no family history of breast cancer.\par \par RISK ASSESSMENT:\par NCI Lifetime Risk: 8.0\par Tyrer-Cuzick Lifetime Risk: 6.2\par \par CLINICAL BREAST EXAM:\par The patient reports her last clinical breast exam was performed over one\par year ago.\par \par COMPARISON STUDIES:\par The present examination has been compared to prior imaging studies performed\par at [facility] on 09/07/2016, 09/11/2017 and\par 11/12/2018.\par \par MAMMOGRAM FINDINGS:\par Mammography was performed including the following views: bilateral\par craniocaudal with tomosynthesis, bilateral mediolateral oblique with\par tomosynthesis. The examination includes digital synthetic 2D and digital\par tomosynthesis 3D images. Additional imaging analysis was performed using CAD\par (computer-aided detection) software.\par \par There are scattered areas of fibroglandular density.\par \par No suspicious mass, grouping of calcifications, or other abnormality is\par identified.\par \par IMPRESSION:\par No mammographic evidence of malignancy.\par \par RECOMMENDATION:\par Unless otherwise indicated by clinical findings, annual screening\par mammography recommended.\par \par ASSESSMENT:\par BI-RADS Category 1: Negative\par \par The patient will be notified of these results by telephone, and will also be\par mailed a written summary in layman's terms.\par \par \par \par \par \par \par \par \par CHELLY WILSON M.D., ATTENDING RADIOLOGIST\par This document has been electronically signed. Nov 18 2019 1:19PM\par \par \par

## 2020-01-20 NOTE — HISTORY OF PRESENT ILLNESS
[FreeTextEntry1] : Rosa is a 54 F with an infected sebaceous cyst in her medial inferior inframammary fold, s/p I and D on 19. \par \par She started to have symptoms 6 days prior.  She went to the ED and was given Keflex.  She had spontaneous drainage of her abscess since starting the antibiotics, but it is still painful.  She denies any fevers or chills and has not had a similar episode in this area before. \par \par She has no breast related complaints at this time.  She denies any breast pain, has not palpated any new palpable masses in either breast and denies any nipple discharge or retraction.  Her most recent imaging was performed on 18, a b/l mammogram, which was unrevealing for any suspicious lesions in either breast.\par \par 19 -- I and D of infected sebaceous cyst \par -citrobacter koseri, resistant to ampicillin \par \par HISTORICAL RISK FACTORS: \par -no prior breast biopsy or surgery \par -no family history of breast or ovarian cancer\par -\par -no prior OCP use \par \par INTERVAL HISTORY:\par Rosa returns for a 6 month follow up visit, s/p I and D of infected sebaceous cyst.  Her cultures grew out citrobacter koseri which was only resistant to ampicillin but sensitive to bactrim and augmentin. \par \par She has not had any recurrence of her infection. She has no breast related complaints at this time.  She denies any breast pain, has not palpated any new palpable masses in either breast and denies any nipple discharge or retraction.  Her most recent imaging was performed on 19, a b/l mammogram, which was unrevealing for any suspicious lesions in either breast, deemed BIRADS 1.\par \par

## 2020-01-20 NOTE — PAST MEDICAL HISTORY
[Menarche Age ____] : age at menarche was [unfilled] [Perimenopausal] : The patient is perimenopausal [Total Preg ___] : G[unfilled] [History of Hormone Replacement Treatment] : has no history of hormone replacement treatment [FreeTextEntry6] : denies [FreeTextEntry5] : denies  [FreeTextEntry7] : denies [FreeTextEntry8] : n/a

## 2020-01-20 NOTE — ASSESSMENT
[FreeTextEntry1] : Rosa is a 54 F with a left chest wall infected sebaceous cyst, s/p I and D on 4/12/19. \par \par On exam, she had b/l nonspecific nodularities but no suspicious abnormalities were palpated within either breast.  She has not had any recurrence of her infection.  Her most recent imaging was performed on 11/18/19, a b/l mammogram, which was unrevealing for any suspicious lesions in either breast, deemed BIRADS 1.\par She will be due for a b/l screening mammogram on 11/18/2020.  This will be scheduled for her today.\par \par AS REVIEW:\par Her cultures grew out citrobacter koseri which was sensitive to Bactrim.  \par \par Epidermal inclusion cysts can occur anywhere on the skin and benign in nature.  They can get inflamed and infected.  The reason for surgical excision is related to symptomatology.  Per Iris, this is the first time she got an infection in that area.  She is not interested in surgical excision of this area.   \par \par She is otherwise at an average risk for breast cancer and should continue with annual screening mammograms, next due on 11/18/2020.  \par \par All of her questions were answered. She knows to call with any further questions or concerns. \par \par PLAN: \par -b/l screening mammogram on 11/18/2020\par -f/up PRN

## 2020-01-20 NOTE — PHYSICAL EXAM
[Normocephalic] : normocephalic [No Supraclavicular Adenopathy] : no supraclavicular adenopathy [EOMI] : extra ocular movement intact [Atraumatic] : atraumatic [No Cervical Adenopathy] : no cervical adenopathy [Examined in the supine and seated position] : examined in the supine and seated position [No dominant masses] : no dominant masses in right breast  [No dominant masses] : no dominant masses left breast [No Nipple Retraction] : no left nipple retraction [No Nipple Discharge] : no right nipple discharge [No Axillary Lymphadenopathy] : no left axillary lymphadenopathy [Soft] : abdomen soft [Not Tender] : non-tender [No Rashes] : no rashes [No Edema] : no edema [No Ulceration] : no ulceration [de-identified] : b/l nonspecific nodularities palpated throughout both breasts, but no suspicious abnormalities palpated in either breast

## 2020-01-20 NOTE — REVIEW OF SYSTEMS
[As Noted in HPI] : as noted in HPI [Hot Flashes] : hot flashes [Negative] : Neurological [Fever] : no fever [Chills] : no chills [Recent Weight Gain (___ Lbs)] : no recent weight gain [Recent Weight Loss (___ Lbs)] : no recent weight loss [Skin Lesions] : no skin lesions [Breast Pain] : no breast pain [Skin Wound] : no skin wound [Breast Lump] : no breast lump

## 2020-01-23 NOTE — PROGRESS NOTE ADULT - SUBJECTIVE AND OBJECTIVE BOX
ELBA MAXWELL 53y Female   MRN#: [id number]    CC: f/u seizures      INTERVAL EVENTS INCLUDE:  per report patient accidentally disconnected EEG overnight, was replaced  Care reviewed with neuro, needs ongoing stay for EEG monitoring  I reviewed with PCP Dr Bangura and with  re low med levels on admission, possible medication nonadherence.  will notify ACT team and refer for VNS as well        ROS:            Vital Signs Last 24 Hrs            PHYSICAL EXAM:    GENERAL: NAD, well-developed, AAOx3, friendly  PULMONARY: Clear to auscultation bilaterally; No wheeze  CARDIOVASCULAR: Regular rate and rhythm; No murmurs, rubs, or gallops  GASTROINTESTINAL: Soft, Nontender, Bowel sounds present  MUSCULOSKELETAL:  BL No clubbing, cyanosis, or edema        DATA: Quality 110: Preventive Care And Screening: Influenza Immunization: Influenza Immunization previously received during influenza season Quality 226: Preventive Care And Screening: Tobacco Use: Screening And Cessation Intervention: Patient screened for tobacco use and is an ex/non-smoker Detail Level: Detailed ELBA MAXWELL 53y Female   MRN#: [id number]    CC: f/u seizures      INTERVAL EVENTS INCLUDE:  per report patient accidentally disconnected EEG overnight, was replaced  Care reviewed with neuro, needs ongoing stay for EEG monitoring  I reviewed with PCP Dr Bangura and with  re low med levels on admission, possible medication nonadherence.  will notify ACT team and refer for VNS as well        ROS:            Vital Signs Last 24 Hrs  T(C): 35.8 (07 May 2018 13:49), Max: 36.1 (07 May 2018 05:31)  T(F): 96.4 (07 May 2018 13:49), Max: 97 (07 May 2018 05:31)  HR: 95 (07 May 2018 13:49) (81 - 95)  BP: 126/59 (07 May 2018 13:49) (92/55 - 126/59)  BP(mean): --  RR: 18 (07 May 2018 13:49) (16 - 18)  SpO2: --        PHYSICAL EXAM:    GENERAL: NAD, well-developed, AAOx3, friendly  PULMONARY: Clear to auscultation bilaterally; No wheeze  CARDIOVASCULAR: Regular rate and rhythm; No murmurs, rubs, or gallops  GASTROINTESTINAL: Soft, Nontender, Bowel sounds present  MUSCULOSKELETAL:  BL No clubbing, cyanosis, or edema        DATA:    no new labs today ELBA MAXWELL 53y Female   MRN#: [id number]    CC: f/u seizures      INTERVAL EVENTS INCLUDE:  per report patient accidentally disconnected EEG overnight, was replaced  Care reviewed with neuro, needs ongoing stay for EEG monitoring  I reviewed with PCP Dr Bangura and with  re low med levels on admission, possible medication nonadherence.  will notify ACT team and refer for VNS as well        ROS:  no complaints or pain  no concerns  no auras or seizures          Vital Signs Last 24 Hrs  T(C): 35.8 (07 May 2018 13:49), Max: 36.1 (07 May 2018 05:31)  T(F): 96.4 (07 May 2018 13:49), Max: 97 (07 May 2018 05:31)  HR: 95 (07 May 2018 13:49) (81 - 95)  BP: 126/59 (07 May 2018 13:49) (92/55 - 126/59)  BP(mean): --  RR: 18 (07 May 2018 13:49) (16 - 18)  SpO2: --        PHYSICAL EXAM:    GENERAL: NAD, well-developed, AAOx3  PULMONARY: Clear to auscultation bilaterally; No wheeze  CARDIOVASCULAR: Regular rate and rhythm; No murmurs, rubs, or gallops  GASTROINTESTINAL: Soft, Nontender, Bowel sounds present  MUSCULOSKELETAL:  BL No clubbing, cyanosis, or edema        DATA:    no new labs today

## 2020-02-27 ENCOUNTER — OUTPATIENT (OUTPATIENT)
Dept: OUTPATIENT SERVICES | Facility: HOSPITAL | Age: 55
LOS: 1 days | Discharge: HOME | End: 2020-02-27

## 2020-02-27 ENCOUNTER — APPOINTMENT (OUTPATIENT)
Dept: INTERNAL MEDICINE | Facility: CLINIC | Age: 55
End: 2020-02-27
Payer: MEDICARE

## 2020-02-27 VITALS
DIASTOLIC BLOOD PRESSURE: 110 MMHG | BODY MASS INDEX: 41.3 KG/M2 | HEART RATE: 91 BPM | WEIGHT: 257 LBS | HEIGHT: 66 IN | SYSTOLIC BLOOD PRESSURE: 145 MMHG

## 2020-02-27 DIAGNOSIS — L30.9 DERMATITIS, UNSPECIFIED: ICD-10-CM

## 2020-02-27 DIAGNOSIS — Z98.890 OTHER SPECIFIED POSTPROCEDURAL STATES: Chronic | ICD-10-CM

## 2020-02-27 PROCEDURE — 99213 OFFICE O/P EST LOW 20 MIN: CPT | Mod: GC

## 2020-02-27 NOTE — PHYSICAL EXAM
[No Acute Distress] : no acute distress [Well Nourished] : well nourished [No Respiratory Distress] : no respiratory distress  [Clear to Auscultation] : lungs were clear to auscultation bilaterally [No Accessory Muscle Use] : no accessory muscle use [Regular Rhythm] : with a regular rhythm [Normal Rate] : normal rate  [Normal S1, S2] : normal S1 and S2 [Non-distended] : non-distended [Soft] : abdomen soft [Non Tender] : non-tender [No Edema] : there was no peripheral edema [Coordination Grossly Intact] : coordination grossly intact [Normal Insight/Judgement] : insight and judgment were intact [de-identified] : 2 cm nodules with clean surgace below the left breast

## 2020-02-27 NOTE — HISTORY OF PRESENT ILLNESS
[de-identified] : 54 year old female with PMHx of bipolar disorder, seizure disorder, and thyroid Ca s/p thyroidectomy is here for follow up visit. patient has history of infected sebaceus cyst in the breast, s/p ID with cultures sensitive to Bactrim and has been following with breast surgeon Dr. Borjas last visit 1/2020. patient states it's painful with site darning clear fluid. no fever or chills. patient also endorses worsening eczema qi of the bilateral hands. rest of ROS is negative.

## 2020-02-27 NOTE — ASSESSMENT
[FreeTextEntry1] : 54 year old female with PMHx of bipolar disorder, seizure disorder, and thyroid Ca s/p thyroidectomy is here for follow up visit. patient has history of infected sebaceus cyst in the breast, s/p ID with cultures sensitive to Bactrim and has been following with breast surgeon Dr. Borjas last visit 1/2020. patient states it's painful with site darning clear fluid. no fever or chills. patient also endorses worsening eczema qi of the bilateral hands. rest of ROS is negative.  \par \par # Breast infected inclusion cyst:\par - finished Bactrim course\par - follow up with Dr. Borjas breast surgery\par \par # Eczema:\par - will try hydrocortisone 2.5 % cream PRN\par \par # Seizure disorder: - Continue Keppra, Divalproex\par \par # Bipolar disorder, stable:- On Olanzapine , Psych f/u\par \par # Recent left ulna/radius fracture s/p ORIF: - stable\par \par # Hx of papillary thyroid Ca, s/p partial thyroidectomy: - Repeat TSH prior to next visit\par \par # HCM\par - Mammography 2019 BIRADS 1 , repeat in 11/2020\par - Colonoscopy 2019 with 10 mm adenoma, repeat in 3 years\par - Received flu shot this year\par \par

## 2020-02-27 NOTE — REVIEW OF SYSTEMS
[Fever] : no fever [Chills] : no chills [Night Sweats] : no night sweats [Discharge] : no discharge [Pain] : no pain [Earache] : no earache [Chest Pain] : no chest pain [Palpitations] : no palpitations [Leg Claudication] : no leg claudication [Shortness Of Breath] : no shortness of breath [Cough] : no cough [Abdominal Pain] : no abdominal pain [Nausea] : no nausea [Constipation] : no constipation [Vomiting] : no vomiting [Dysuria] : no dysuria [Incontinence] : no incontinence [Frequency] : no frequency [Joint Pain] : no joint pain [Muscle Pain] : no muscle pain [Itching] : no itching [Headache] : no headache [Dizziness] : no dizziness [Suicidal] : not suicidal [Insomnia] : no insomnia [Anxiety] : no anxiety [Depression] : no depression

## 2020-03-04 DIAGNOSIS — E78.5 HYPERLIPIDEMIA, UNSPECIFIED: ICD-10-CM

## 2020-03-04 DIAGNOSIS — L30.9 DERMATITIS, UNSPECIFIED: ICD-10-CM

## 2020-03-04 DIAGNOSIS — Z00.00 ENCOUNTER FOR GENERAL ADULT MEDICAL EXAMINATION WITHOUT ABNORMAL FINDINGS: ICD-10-CM

## 2020-03-06 ENCOUNTER — APPOINTMENT (OUTPATIENT)
Dept: BREAST CENTER | Facility: CLINIC | Age: 55
End: 2020-03-06
Payer: MEDICARE

## 2020-03-06 VITALS
TEMPERATURE: 98.1 F | DIASTOLIC BLOOD PRESSURE: 80 MMHG | HEIGHT: 66 IN | BODY MASS INDEX: 41.3 KG/M2 | SYSTOLIC BLOOD PRESSURE: 136 MMHG | WEIGHT: 257 LBS

## 2020-03-06 DIAGNOSIS — L72.3 SEBACEOUS CYST: ICD-10-CM

## 2020-03-06 PROCEDURE — 99213 OFFICE O/P EST LOW 20 MIN: CPT

## 2020-03-06 NOTE — ASSESSMENT
[FreeTextEntry1] : Rosa is a 55 F with a left chest wall infected sebaceous cyst, s/p I and D on 4/12/19, now with a recurrent infection (3/6/2020).\par \par She has been taking Bactrim antibiotics with improvement in her sebaceous cyst as it spontaneously drained.  \par \par On exam, she had b/l nonspecific nodularities but no suspicious abnormalities were palpated within either breast.  She has two sebaceous cysts on her medial left breast/chest wall which do not appear infected or inflamed at this time.  \par \par I have recommended that she complete the 1 week course of antibiotics, however I do not think that any further intervention is indicated at this time.  We discussed surgical excision of her sebaceous cysts, but she has several in the same area, and this particular sebaceous cyst has only gotten infected twice within the last year, so we have decided for continued observation.  If she would like surgery, she will call back and let us know. \par \par Her most recent imaging was performed on 11/18/19, a b/l mammogram, which was unrevealing for any suspicious lesions in either breast, deemed BIRADS 1.\par She will be due for a b/l screening mammogram on 11/18/2020.  This will be scheduled for her today.\par \par AS REVIEW:\par Her cultures grew out citrobacter koseri which was sensitive to Bactrim.  \par \par Epidermal inclusion cysts can occur anywhere on the skin and benign in nature.  They can get inflamed and infected.  The reason for surgical excision is related to symptomatology.  Per Iris, this is the first time she got an infection in that area.  She is not interested in surgical excision of this area.   \par \par She is otherwise at an average risk for breast cancer and should continue with annual screening mammograms, next due on 11/18/2020.  \par \par All of her questions were answered. She knows to call with any further questions or concerns. \par \par PLAN: \par -b/l screening mammogram on 11/18/2020\par -f/up PRN

## 2020-03-06 NOTE — PAST MEDICAL HISTORY
[Perimenopausal] : The patient is perimenopausal [Menarche Age ____] : age at menarche was [unfilled] [History of Hormone Replacement Treatment] : has no history of hormone replacement treatment [Total Preg ___] : G[unfilled] [FreeTextEntry5] : denies  [FreeTextEntry6] : denies [FreeTextEntry7] : denies [FreeTextEntry8] : n/a

## 2020-03-06 NOTE — PHYSICAL EXAM
[Normocephalic] : normocephalic [Atraumatic] : atraumatic [EOMI] : extra ocular movement intact [No Supraclavicular Adenopathy] : no supraclavicular adenopathy [No Cervical Adenopathy] : no cervical adenopathy [Examined in the supine and seated position] : examined in the supine and seated position [No dominant masses] : no dominant masses in right breast  [No dominant masses] : no dominant masses left breast [No Nipple Retraction] : no left nipple retraction [No Nipple Discharge] : no left nipple discharge [No Axillary Lymphadenopathy] : no left axillary lymphadenopathy [Soft] : abdomen soft [Not Tender] : non-tender [No Edema] : no edema [No Rashes] : no rashes [No Ulceration] : no ulceration [de-identified] : b/l nonspecific nodularities palpated throughout both breasts, but no suspicious abnormalities palpated in either breast  [de-identified] : in the medial breast close to her inframammary fold, she does have a sebaceous cyst present where her prior I and D site was, however is it not currently draining, there is no surrounding erythema or induration; just medial to this area, she has another sebaceous cyst that is not tender, erythematous or indurated. NO other signs of infection or other abnormalities palpated

## 2020-03-06 NOTE — REVIEW OF SYSTEMS
[Fever] : no fever [Chills] : no chills [Recent Weight Gain (___ Lbs)] : no recent weight gain [Recent Weight Loss (___ Lbs)] : no recent weight loss [As Noted in HPI] : as noted in HPI [Skin Lesions] : no skin lesions [Skin Wound] : skin wound [Breast Pain] : no breast pain [Breast Lump] : no breast lump [Hot Flashes] : hot flashes [Negative] : Heme/Lymph

## 2020-03-06 NOTE — HISTORY OF PRESENT ILLNESS
[FreeTextEntry1] : Rosa is a 55 F with an infected sebaceous cyst in her medial inferior inframammary fold, s/p I and D on 19. \par \par She started to have symptoms 6 days prior.  She went to the ED and was given Keflex.  She had spontaneous drainage of her abscess since starting the antibiotics, but it is still painful.  She denies any fevers or chills and has not had a similar episode in this area before. \par \par She has no breast related complaints at this time.  She denies any breast pain, has not palpated any new palpable masses in either breast and denies any nipple discharge or retraction.  Her most recent imaging was performed on 18, a b/l mammogram, which was unrevealing for any suspicious lesions in either breast.\par \par 19 -- I and D of infected sebaceous cyst \par -citrobacter koseri, resistant to ampicillin \par \par HISTORICAL RISK FACTORS: \par -no prior breast biopsy or surgery \par -no family history of breast or ovarian cancer\par -\par -no prior OCP use \par \par INTERVAL HISTORY:\par Rosa returns for short term follow up visit, s/p I and D of infected sebaceous cyst.  Her cultures grew out citrobacter koseri which was only resistant to ampicillin but sensitive to bactrim and augmentin. \par \par Starting about 1 week prior, the area of her prior infected sebaceous cyst in the left medial breast started to become red and painful.  She was called in a prescription for bactrim antibiotics which she is on day 6/7.  She had spontaneous drainage of her infected sebaceous cyst and the redness and pain has improved since then.  She also thinks she is forming another sebaceous cyst just medial to this area.  This is not red or painful, however. \par \par She denies any fevers or chills. \par She has not had any recurrence of her infection. She has no breast related complaints at this time.  She denies any breast pain, has not palpated any new palpable masses in either breast and denies any nipple discharge or retraction.  Her most recent imaging was performed on 19, a b/l mammogram, which was unrevealing for any suspicious lesions in either breast, deemed BIRADS 1.\par \par

## 2020-03-11 ENCOUNTER — OUTPATIENT (OUTPATIENT)
Dept: OUTPATIENT SERVICES | Facility: HOSPITAL | Age: 55
LOS: 1 days | Discharge: HOME | End: 2020-03-11

## 2020-03-11 ENCOUNTER — APPOINTMENT (OUTPATIENT)
Dept: OBGYN | Facility: CLINIC | Age: 55
End: 2020-03-11
Payer: MEDICARE

## 2020-03-11 VITALS
SYSTOLIC BLOOD PRESSURE: 120 MMHG | HEIGHT: 66 IN | DIASTOLIC BLOOD PRESSURE: 82 MMHG | BODY MASS INDEX: 40.34 KG/M2 | WEIGHT: 251 LBS

## 2020-03-11 DIAGNOSIS — Z98.890 OTHER SPECIFIED POSTPROCEDURAL STATES: Chronic | ICD-10-CM

## 2020-03-11 DIAGNOSIS — Z78.0 ASYMPTOMATIC MENOPAUSAL STATE: ICD-10-CM

## 2020-03-11 PROCEDURE — G0101: CPT

## 2020-03-12 DIAGNOSIS — Z01.419 ENCOUNTER FOR GYNECOLOGICAL EXAMINATION (GENERAL) (ROUTINE) WITHOUT ABNORMAL FINDINGS: ICD-10-CM

## 2020-03-26 LAB — BACTERIA WND CULT: ABNORMAL

## 2020-04-07 NOTE — PHYSICAL EXAM
Sky Luciano presents today for   Chief Complaint   Patient presents with    Diabetes    Hypertension    Arthritis    Asthma       Is someone accompanying this pt? na    Is the patient using any DME equipment during OV? na    Depression Screening:  3 most recent PHQ Screens 2/11/2019   Little interest or pleasure in doing things Not at all   Feeling down, depressed, irritable, or hopeless Not at all   Total Score PHQ 2 0       Learning Assessment:  Learning Assessment 12/2/2013   PRIMARY LEARNER Patient   PRIMARY LANGUAGE ENGLISH   LEARNER PREFERENCE PRIMARY LISTENING   ANSWERED BY patient   RELATIONSHIP SELF       Abuse Screening:  Abuse Screening Questionnaire 9/10/2018   Do you ever feel afraid of your partner? N   Are you in a relationship with someone who physically or mentally threatens you? N   Is it safe for you to go home? Y       Fall Risk  Fall Risk Assessment, last 12 mths 11/5/2019   Able to walk? Yes   Fall in past 12 months? No       Health Maintenance reviewed and discussed and ordered per Provider. Health Maintenance Due   Topic Date Due    Shingrix Vaccine Age 49> (1 of 2) 08/23/2000    Pneumococcal 65+ years (2 of 2 - PPSV23) 10/24/2016    GLAUCOMA SCREENING Q2Y  01/22/2020    Eye Exam Retinal or Dilated  01/23/2020    MICROALBUMIN Q1  02/04/2020    Lipid Screen  02/04/2020    Medicare Yearly Exam  02/12/2020   . Coordination of Care:  1. Have you been to the ER, urgent care clinic since your last visit? Hospitalized since your last visit? no    2. Have you seen or consulted any other health care providers outside of the [address] [place] [address] since your last visit? Include any pap smears or colon screening. no      Last  Checked na  Last UDS Checked na  Last Pain contract signed: na    Patient presents in office today for routine care. Patient concerns: med eval and med refills and stomach hurts after eating. [Normocephalic] : normocephalic [Atraumatic] : atraumatic [EOMI] : extra ocular movement intact [No Supraclavicular Adenopathy] : no supraclavicular adenopathy [No Cervical Adenopathy] : no cervical adenopathy [No dominant masses] : no dominant masses in right breast  [No dominant masses] : no dominant masses left breast [No Nipple Retraction] : no left nipple retraction [No Nipple Discharge] : no left nipple discharge [No Edema] : no edema [No Ulceration] : no ulceration [No Rashes] : no rashes [Examined in the supine and seated position] : examined in the supine and seated position [No Axillary Lymphadenopathy] : no left axillary lymphadenopathy [Soft] : abdomen soft [Not Tender] : non-tender [de-identified] : 2 x 2 cm inflamed and infected sebaceous cyst, s/p I and D 4/12/19, she has surrounding induration, but minimal erythema

## 2020-05-02 NOTE — DISCHARGE NOTE NURSING/CASE MANAGEMENT/SOCIAL WORK - NSSCTYPOFSERV_GEN_ALL_CORE
73 y/o female presents to the ED c/o worsening SOB, cough, fever, myalgia x2 weeks.  states was tested COVID+ last week at [facility].  states sob has been worsening over time.  +fever, cough.  no other acute complaints at this time.  Patient admitted with acute hypoxic respiratory failure due to [id number] infection.    4/30- titrated down to 6lpm on nasal cannula. stated that she feels better this morning. wants to go home .   5/1- NC down to 4L- doing well with her breathing as per her report- complaining that the lasix makes her urinate too many times- rationale for lasix administration explained to pt   patient.  5/2- pt on 5L NC 93-96%, will check on 4 l NC, patient reports no SOB at rest on 5 L NC , eager to go home and says will be disappointed if cant go todayPE:  Constitutional: NAD, laying in bed- on 5 L NC  HEENT: NC/AT  Back: no tenderness  Respiratory: diminished at the base.   Cardiovascular: S1S2 regular, no murmurs  Abdomen: soft, not tender, not distended, positive BS  Genitourinary: voiding SN, PT, SW

## 2020-05-11 ENCOUNTER — APPOINTMENT (OUTPATIENT)
Dept: INTERNAL MEDICINE | Facility: CLINIC | Age: 55
End: 2020-05-11

## 2020-06-08 ENCOUNTER — EMERGENCY (EMERGENCY)
Facility: HOSPITAL | Age: 55
LOS: 0 days | Discharge: HOME | End: 2020-06-08
Attending: EMERGENCY MEDICINE | Admitting: EMERGENCY MEDICINE
Payer: MEDICARE

## 2020-06-08 VITALS
DIASTOLIC BLOOD PRESSURE: 78 MMHG | RESPIRATION RATE: 18 BRPM | OXYGEN SATURATION: 99 % | HEART RATE: 76 BPM | SYSTOLIC BLOOD PRESSURE: 127 MMHG | TEMPERATURE: 97 F

## 2020-06-08 DIAGNOSIS — Z91.018 ALLERGY TO OTHER FOODS: ICD-10-CM

## 2020-06-08 DIAGNOSIS — L29.9 PRURITUS, UNSPECIFIED: ICD-10-CM

## 2020-06-08 DIAGNOSIS — R21 RASH AND OTHER NONSPECIFIC SKIN ERUPTION: ICD-10-CM

## 2020-06-08 DIAGNOSIS — Z88.0 ALLERGY STATUS TO PENICILLIN: ICD-10-CM

## 2020-06-08 DIAGNOSIS — Z98.890 OTHER SPECIFIED POSTPROCEDURAL STATES: Chronic | ICD-10-CM

## 2020-06-08 PROCEDURE — 99283 EMERGENCY DEPT VISIT LOW MDM: CPT

## 2020-06-08 RX ORDER — HYDROCORTISONE 1 %
1 OINTMENT (GRAM) TOPICAL
Qty: 28 | Refills: 0
Start: 2020-06-08 | End: 2020-06-21

## 2020-06-08 RX ORDER — DEXAMETHASONE 0.5 MG/5ML
10 ELIXIR ORAL ONCE
Refills: 0 | Status: COMPLETED | OUTPATIENT
Start: 2020-06-08 | End: 2020-06-08

## 2020-06-08 RX ADMIN — Medication 10 MILLIGRAM(S): at 07:58

## 2020-06-08 NOTE — ED PROVIDER NOTE - PHYSICAL EXAMINATION
CONSTITUTIONAL: Well-developed; well-nourished; in no acute distress.   SKIN: warm, dry, + excoriation over the dorsals aspect of the right hand, no lesions.   HEAD: Normocephalic; atraumatic.  EYES: no conjunctival injection. PERRL.   ENT: No nasal discharge; airway clear. No MM involvement.   NECK: Supple; non tender.  CARD: S1, S2 normal; no murmurs, gallops, or rubs. Regular rate and rhythm.   RESP: No wheezes, rales or rhonchi.  ABD: soft ntnd  EXT: Normal ROM.  No clubbing, cyanosis or edema.   LYMPH: No acute cervical adenopathy.  NEURO: Alert, oriented, grossly unremarkable  PSYCH: Cooperative, appropriate.

## 2020-06-08 NOTE — ED PROVIDER NOTE - PATIENT PORTAL LINK FT
You can access the FollowMyHealth Patient Portal offered by [facility] by registering at the following website: [email or web address]. By joining [employer]’s FollowMyHealth portal, you will also be able to view your health information using other applications (apps) compatible with our system.

## 2020-06-08 NOTE — ED PROVIDER NOTE - NS ED ROS FT
Review of Systems:  •	CONSTITUTIONAL - No fever, No diaphoresis, No weight change  •	SKIN - + rash as per HPI  •	HEMATOLOGIC - No abnormal bleeding or bruising  •	EYES - No eye pain, No blurred vision  •	ENT - No change in hearing, No sore throat, No neck pain, No rhinorrhea, No ear pain  •	RESPIRATORY - No shortness of breath, No cough  •	CARDIAC -No chest pain, No palpitations  •	GI - No abdominal pain, No nausea, No vomiting, No diarrhea, No constipation, No bright red blood per rectum or melena. No flank pain  •             - No dysuria, frequency, hematuria.   •	ENDO - No polydypsia, No polyuria, No heat/cold intolerance  •	MUSCULOSKELETAL - No joint paint, No swelling, No back pain  •	NEUROLOGIC - No numbness, No focal weakness, No headache, No dizziness  All other systems negative, unless specified in HPI

## 2020-06-08 NOTE — ED PROVIDER NOTE - OBJECTIVE STATEMENT
54 y/o female with PMH of seizure disorder who presents to ED for right hand rash x4 days. Rash described as itchy, not painful. Pt has been applying cortisone OTC cream with decreased rash. No fever or chills. No mouth involvement.

## 2020-06-08 NOTE — ED PROVIDER NOTE - CARE PROVIDER_API CALL
Carlos Alberto León  Dermatology  [address] [place] [id number]  Phone: [phone]  Fax: [phone]  Follow Up Time: 1-3 Days

## 2020-06-08 NOTE — ED PROVIDER NOTE - CLINICAL SUMMARY MEDICAL DECISION MAKING FREE TEXT BOX
55F p/w right hand rash. no crepitus, no pain, no blisters. right hand dorsal excoriations noted. given steroids. dc home.

## 2020-06-15 ENCOUNTER — EMERGENCY (EMERGENCY)
Facility: HOSPITAL | Age: 55
LOS: 0 days | Discharge: HOME | End: 2020-06-15
Attending: EMERGENCY MEDICINE | Admitting: EMERGENCY MEDICINE
Payer: MEDICARE

## 2020-06-15 VITALS
RESPIRATION RATE: 18 BRPM | SYSTOLIC BLOOD PRESSURE: 135 MMHG | TEMPERATURE: 98 F | HEART RATE: 75 BPM | OXYGEN SATURATION: 99 % | DIASTOLIC BLOOD PRESSURE: 77 MMHG

## 2020-06-15 DIAGNOSIS — M79.662 PAIN IN LEFT LOWER LEG: ICD-10-CM

## 2020-06-15 DIAGNOSIS — F32.9 MAJOR DEPRESSIVE DISORDER, SINGLE EPISODE, UNSPECIFIED: ICD-10-CM

## 2020-06-15 DIAGNOSIS — Z91.018 ALLERGY TO OTHER FOODS: ICD-10-CM

## 2020-06-15 DIAGNOSIS — M25.569 PAIN IN UNSPECIFIED KNEE: ICD-10-CM

## 2020-06-15 DIAGNOSIS — Z98.890 OTHER SPECIFIED POSTPROCEDURAL STATES: Chronic | ICD-10-CM

## 2020-06-15 DIAGNOSIS — M79.661 PAIN IN RIGHT LOWER LEG: ICD-10-CM

## 2020-06-15 DIAGNOSIS — Z88.0 ALLERGY STATUS TO PENICILLIN: ICD-10-CM

## 2020-06-15 PROCEDURE — 99284 EMERGENCY DEPT VISIT MOD MDM: CPT

## 2020-06-15 PROCEDURE — 93970 EXTREMITY STUDY: CPT | Mod: 26

## 2020-06-15 RX ORDER — ACETAMINOPHEN 500 MG
975 TABLET ORAL ONCE
Refills: 0 | Status: COMPLETED | OUTPATIENT
Start: 2020-06-15 | End: 2020-06-15

## 2020-06-15 RX ADMIN — Medication 975 MILLIGRAM(S): at 09:16

## 2020-06-15 NOTE — ED ADULT NURSE NOTE - OBJECTIVE STATEMENT
55 yr old female came in complaining of left calf pain, pt denies any injury to shin or calf, pt describes pain as tightness of muscle from ankle that radiates to knee, no swelling, redness, or discoloration notes, pt denies any fever.

## 2020-06-15 NOTE — ED PROVIDER NOTE - PROGRESS NOTE DETAILS
ATTENDING NOTE: I personally evaluated the patient. I reviewed the Resident’s note (as assigned above), and agree with the findings and plan except as documented in my note. 54 y/o F who is a non-smoker and has HX of DVT in past not on any blood thinners presents with b/l lower extremity aches and pains, especially when walking or climbing. Pt has no difficulty ambulating secondary to pain and is without any fevers or SOB. On exam: Vitals stable, non-toxic well appearing, pink conjunctiva, anicteric. Abdomen: soft. No respiratory distress. No LAD. No tenderness to thighs or calves, no asymmetry or skin changes. Equal b/l DP pulses 2+, no crepitus. Plan: Will repeat duplex. ATTENDING NOTE: I personally evaluated the patient. I reviewed the Physician Assistant’s note (as assigned above), and agree with the findings and plan except as documented in my note. 54 y/o F who is a non-smoker and has HX of DVT in past not on any blood thinners presents with b/l lower extremity aches and pains, especially when walking or climbing. Pt has no difficulty ambulating secondary to pain and is without any fevers or SOB. On exam: Vitals stable, non-toxic well appearing, pink conjunctiva, anicteric. Abdomen: soft. No respiratory distress. No LAD. No tenderness to thighs or calves, no asymmetry or skin changes. Equal b/l DP pulses 2+, no crepitus. Plan: Will repeat duplex. Vascular tech Rupal prelim no dvt

## 2020-06-15 NOTE — ED PROVIDER NOTE - NSFOLLOWUPCLINICS_GEN_ALL_ED_FT
[facility]  Orthpedic  [address] [place], [place]   Phone: [phone]  Fax:   Follow Up Time:

## 2020-06-15 NOTE — ED PROVIDER NOTE - PATIENT PORTAL LINK FT
You can access the FollowMyHealth Patient Portal offered by [facility] by registering at the following website: [email or web address]. By joining [employer]’s FollowMyHealth portal, you will also be able to view your health information using other applications (apps) compatible with our system.

## 2020-06-15 NOTE — ED ADULT NURSE NOTE - NSIMPLEMENTINTERV_GEN_ALL_ED
Implemented All Universal Safety Interventions:  [facility] [place] to call system. Call bell, personal items and telephone within reach. Instruct patient to call for assistance. Room bathroom lighting operational. Non-slip footwear when patient is off stretcher. Physically safe environment: no spills, clutter or unnecessary equipment. Stretcher in lowest position, wheels locked, appropriate side rails in place.

## 2020-06-15 NOTE — ED PROVIDER NOTE - OBJECTIVE STATEMENT
56 y/o female with hx Seizures, DVT presents to the ED c/o "I have left lower leg pain for 2 days. I didn't injure my leg. I had a blood clot in the past." no hx trauma/ fever/ chills/ CP/ SOB

## 2020-06-29 RX ORDER — SULFAMETHOXAZOLE AND TRIMETHOPRIM 800; 160 MG/1; MG/1
800-160 TABLET ORAL TWICE DAILY
Qty: 14 | Refills: 0 | Status: COMPLETED | COMMUNITY
Start: 2020-02-28 | End: 2020-03-06

## 2020-06-29 RX ORDER — IBUPROFEN 600 MG/1
600 TABLET, FILM COATED ORAL 3 TIMES DAILY
Qty: 20 | Refills: 0 | Status: COMPLETED | COMMUNITY
Start: 2019-07-01 | End: 2020-06-29

## 2020-08-21 ENCOUNTER — OUTPATIENT (OUTPATIENT)
Dept: OUTPATIENT SERVICES | Facility: HOSPITAL | Age: 55
LOS: 1 days | Discharge: HOME | End: 2020-08-21

## 2020-08-21 DIAGNOSIS — Z98.890 OTHER SPECIFIED POSTPROCEDURAL STATES: Chronic | ICD-10-CM

## 2020-08-24 DIAGNOSIS — F31.81 BIPOLAR II DISORDER: ICD-10-CM

## 2020-08-24 DIAGNOSIS — F20.89 OTHER SCHIZOPHRENIA: ICD-10-CM

## 2020-08-25 ENCOUNTER — OUTPATIENT (OUTPATIENT)
Dept: OUTPATIENT SERVICES | Facility: HOSPITAL | Age: 55
LOS: 1 days | Discharge: HOME | End: 2020-08-25

## 2020-08-25 ENCOUNTER — APPOINTMENT (OUTPATIENT)
Dept: INTERNAL MEDICINE | Facility: CLINIC | Age: 55
End: 2020-08-25

## 2020-08-25 DIAGNOSIS — Z98.890 OTHER SPECIFIED POSTPROCEDURAL STATES: Chronic | ICD-10-CM

## 2020-08-26 DIAGNOSIS — Z87.310 PERSONAL HISTORY OF (HEALED) OSTEOPOROSIS FRACTURE: ICD-10-CM

## 2020-08-26 DIAGNOSIS — Z13.820 ENCOUNTER FOR SCREENING FOR OSTEOPOROSIS: ICD-10-CM

## 2020-08-26 DIAGNOSIS — Z78.0 ASYMPTOMATIC MENOPAUSAL STATE: ICD-10-CM

## 2020-08-26 DIAGNOSIS — M89.9 DISORDER OF BONE, UNSPECIFIED: ICD-10-CM

## 2020-09-08 ENCOUNTER — OUTPATIENT (OUTPATIENT)
Dept: OUTPATIENT SERVICES | Facility: HOSPITAL | Age: 55
LOS: 1 days | Discharge: HOME | End: 2020-09-08

## 2020-09-08 DIAGNOSIS — Z98.890 OTHER SPECIFIED POSTPROCEDURAL STATES: Chronic | ICD-10-CM

## 2020-09-08 DIAGNOSIS — F20.0 PARANOID SCHIZOPHRENIA: ICD-10-CM

## 2020-09-08 DIAGNOSIS — F31.81 BIPOLAR II DISORDER: ICD-10-CM

## 2020-09-14 ENCOUNTER — APPOINTMENT (OUTPATIENT)
Dept: INTERNAL MEDICINE | Facility: CLINIC | Age: 55
End: 2020-09-14

## 2020-09-17 ENCOUNTER — OUTPATIENT (OUTPATIENT)
Dept: OUTPATIENT SERVICES | Facility: HOSPITAL | Age: 55
LOS: 1 days | Discharge: HOME | End: 2020-09-17
Payer: MEDICARE

## 2020-09-17 DIAGNOSIS — F20.9 SCHIZOPHRENIA, UNSPECIFIED: ICD-10-CM

## 2020-09-17 DIAGNOSIS — Z98.890 OTHER SPECIFIED POSTPROCEDURAL STATES: Chronic | ICD-10-CM

## 2020-09-17 PROCEDURE — 90792 PSYCH DIAG EVAL W/MED SRVCS: CPT | Mod: GC,95

## 2020-09-22 ENCOUNTER — OUTPATIENT (OUTPATIENT)
Dept: OUTPATIENT SERVICES | Facility: HOSPITAL | Age: 55
LOS: 1 days | Discharge: HOME | End: 2020-09-22
Payer: MEDICARE

## 2020-09-22 DIAGNOSIS — R94.31 ABNORMAL ELECTROCARDIOGRAM [ECG] [EKG]: ICD-10-CM

## 2020-09-22 DIAGNOSIS — Z98.890 OTHER SPECIFIED POSTPROCEDURAL STATES: Chronic | ICD-10-CM

## 2020-09-22 LAB
A1C WITH ESTIMATED AVERAGE GLUCOSE RESULT: 5.8 % — HIGH (ref 4–5.6)
ALBUMIN SERPL ELPH-MCNC: 4.1 G/DL — SIGNIFICANT CHANGE UP (ref 3.5–5.2)
ALP SERPL-CCNC: 116 U/L — HIGH (ref 30–115)
ALT FLD-CCNC: 18 U/L — SIGNIFICANT CHANGE UP (ref 0–41)
ANION GAP SERPL CALC-SCNC: 16 MMOL/L — HIGH (ref 7–14)
AST SERPL-CCNC: 19 U/L — SIGNIFICANT CHANGE UP (ref 0–41)
BASOPHILS # BLD AUTO: 0.04 K/UL — SIGNIFICANT CHANGE UP (ref 0–0.2)
BASOPHILS NFR BLD AUTO: 0.6 % — SIGNIFICANT CHANGE UP (ref 0–1)
BILIRUB SERPL-MCNC: 0.2 MG/DL — SIGNIFICANT CHANGE UP (ref 0.2–1.2)
BUN SERPL-MCNC: 18 MG/DL — SIGNIFICANT CHANGE UP (ref 10–20)
CALCIUM SERPL-MCNC: 9.7 MG/DL — SIGNIFICANT CHANGE UP (ref 8.5–10.1)
CHLORIDE SERPL-SCNC: 102 MMOL/L — SIGNIFICANT CHANGE UP (ref 98–110)
CHOLEST SERPL-MCNC: 183 MG/DL — SIGNIFICANT CHANGE UP (ref 100–200)
CO2 SERPL-SCNC: 21 MMOL/L — SIGNIFICANT CHANGE UP (ref 17–32)
CREAT SERPL-MCNC: 0.7 MG/DL — SIGNIFICANT CHANGE UP (ref 0.7–1.5)
EOSINOPHIL # BLD AUTO: 0.11 K/UL — SIGNIFICANT CHANGE UP (ref 0–0.7)
EOSINOPHIL NFR BLD AUTO: 1.7 % — SIGNIFICANT CHANGE UP (ref 0–8)
ESTIMATED AVERAGE GLUCOSE: 120 MG/DL — HIGH (ref 68–114)
GLUCOSE SERPL-MCNC: 106 MG/DL — HIGH (ref 70–99)
HCT VFR BLD CALC: 40.6 % — SIGNIFICANT CHANGE UP (ref 37–47)
HDLC SERPL-MCNC: 56 MG/DL — SIGNIFICANT CHANGE UP
HGB BLD-MCNC: 13.1 G/DL — SIGNIFICANT CHANGE UP (ref 12–16)
IMM GRANULOCYTES NFR BLD AUTO: 0.2 % — SIGNIFICANT CHANGE UP (ref 0.1–0.3)
LIPID PNL WITH DIRECT LDL SERPL: 105 MG/DL — SIGNIFICANT CHANGE UP (ref 4–129)
LYMPHOCYTES # BLD AUTO: 2.83 K/UL — SIGNIFICANT CHANGE UP (ref 1.2–3.4)
LYMPHOCYTES # BLD AUTO: 43.5 % — SIGNIFICANT CHANGE UP (ref 20.5–51.1)
MCHC RBC-ENTMCNC: 30.6 PG — SIGNIFICANT CHANGE UP (ref 27–31)
MCHC RBC-ENTMCNC: 32.3 G/DL — SIGNIFICANT CHANGE UP (ref 32–37)
MCV RBC AUTO: 94.9 FL — SIGNIFICANT CHANGE UP (ref 81–99)
MONOCYTES # BLD AUTO: 0.47 K/UL — SIGNIFICANT CHANGE UP (ref 0.1–0.6)
MONOCYTES NFR BLD AUTO: 7.2 % — SIGNIFICANT CHANGE UP (ref 1.7–9.3)
NEUTROPHILS # BLD AUTO: 3.04 K/UL — SIGNIFICANT CHANGE UP (ref 1.4–6.5)
NEUTROPHILS NFR BLD AUTO: 46.8 % — SIGNIFICANT CHANGE UP (ref 42.2–75.2)
NRBC # BLD: 0 /100 WBCS — SIGNIFICANT CHANGE UP (ref 0–0)
PLATELET # BLD AUTO: 324 K/UL — SIGNIFICANT CHANGE UP (ref 130–400)
POTASSIUM SERPL-MCNC: 4.3 MMOL/L — SIGNIFICANT CHANGE UP (ref 3.5–5)
POTASSIUM SERPL-SCNC: 4.3 MMOL/L — SIGNIFICANT CHANGE UP (ref 3.5–5)
PROT SERPL-MCNC: 7.4 G/DL — SIGNIFICANT CHANGE UP (ref 6–8)
RBC # BLD: 4.28 M/UL — SIGNIFICANT CHANGE UP (ref 4.2–5.4)
RBC # FLD: 12.9 % — SIGNIFICANT CHANGE UP (ref 11.5–14.5)
SODIUM SERPL-SCNC: 139 MMOL/L — SIGNIFICANT CHANGE UP (ref 135–146)
TOTAL CHOLESTEROL/HDL RATIO MEASUREMENT: 3.3 RATIO — LOW (ref 4–5.5)
TRIGL SERPL-MCNC: 233 MG/DL — HIGH (ref 10–149)
VALPROATE SERPL-MCNC: 4 UG/ML — LOW (ref 50–100)
WBC # BLD: 6.5 K/UL — SIGNIFICANT CHANGE UP (ref 4.8–10.8)
WBC # FLD AUTO: 6.5 K/UL — SIGNIFICANT CHANGE UP (ref 4.8–10.8)

## 2020-09-22 PROCEDURE — 93010 ELECTROCARDIOGRAM REPORT: CPT

## 2020-09-23 LAB — TSH SERPL-MCNC: 0.92 UIU/ML — SIGNIFICANT CHANGE UP (ref 0.27–4.2)

## 2020-09-27 NOTE — ASU DISCHARGE PLAN (ADULT/PEDIATRIC) - MODE OF TRANSPORTATION
"Presents accompanied by friend.  She C/O right arm/hand pain after experiencing a fall from her bed, last night.  Chief Complaint   Patient presents with   • T-5000 FALL   • Arm Injury     BP (!) 165/93   Pulse 70   Temp 36.1 °C (97 °F) (Temporal)   Resp 16   Ht 1.6 m (5' 3\")   Wt 73 kg (160 lb 15 oz)   SpO2 92%   BMI 28.51 kg/m²     "
Wheelchair/Stroller

## 2020-10-29 ENCOUNTER — OUTPATIENT (OUTPATIENT)
Dept: OUTPATIENT SERVICES | Facility: HOSPITAL | Age: 55
LOS: 1 days | Discharge: HOME | End: 2020-10-29
Payer: MEDICARE

## 2020-10-29 DIAGNOSIS — F25.0 SCHIZOAFFECTIVE DISORDER, BIPOLAR TYPE: ICD-10-CM

## 2020-10-29 DIAGNOSIS — Z98.890 OTHER SPECIFIED POSTPROCEDURAL STATES: Chronic | ICD-10-CM

## 2020-10-29 PROCEDURE — 99213 OFFICE O/P EST LOW 20 MIN: CPT | Mod: GC,95

## 2020-11-05 ENCOUNTER — EMERGENCY (EMERGENCY)
Facility: HOSPITAL | Age: 55
LOS: 0 days | Discharge: HOME | End: 2020-11-05
Attending: EMERGENCY MEDICINE | Admitting: EMERGENCY MEDICINE
Payer: MEDICARE

## 2020-11-05 VITALS
HEART RATE: 91 BPM | DIASTOLIC BLOOD PRESSURE: 81 MMHG | OXYGEN SATURATION: 97 % | HEIGHT: 66 IN | SYSTOLIC BLOOD PRESSURE: 130 MMHG | RESPIRATION RATE: 20 BRPM | WEIGHT: 240.08 LBS | TEMPERATURE: 97 F

## 2020-11-05 DIAGNOSIS — R21 RASH AND OTHER NONSPECIFIC SKIN ERUPTION: ICD-10-CM

## 2020-11-05 DIAGNOSIS — G40.909 EPILEPSY, UNSPECIFIED, NOT INTRACTABLE, WITHOUT STATUS EPILEPTICUS: ICD-10-CM

## 2020-11-05 DIAGNOSIS — F31.9 BIPOLAR DISORDER, UNSPECIFIED: ICD-10-CM

## 2020-11-05 DIAGNOSIS — M79.643 PAIN IN UNSPECIFIED HAND: ICD-10-CM

## 2020-11-05 DIAGNOSIS — M79.641 PAIN IN RIGHT HAND: ICD-10-CM

## 2020-11-05 DIAGNOSIS — Z88.0 ALLERGY STATUS TO PENICILLIN: ICD-10-CM

## 2020-11-05 DIAGNOSIS — Z91.018 ALLERGY TO OTHER FOODS: ICD-10-CM

## 2020-11-05 DIAGNOSIS — Z87.891 PERSONAL HISTORY OF NICOTINE DEPENDENCE: ICD-10-CM

## 2020-11-05 DIAGNOSIS — Z79.899 OTHER LONG TERM (CURRENT) DRUG THERAPY: ICD-10-CM

## 2020-11-05 DIAGNOSIS — Z98.890 OTHER SPECIFIED POSTPROCEDURAL STATES: Chronic | ICD-10-CM

## 2020-11-05 PROCEDURE — L9991: CPT

## 2020-11-05 NOTE — ED PROVIDER NOTE - OBJECTIVE STATEMENT
55Y F with PMH of psoriatic arthritis presents with CC of right hand pain over the 2nd and 3 rd digits more yossi dry scaly rash over the digits, applying hydrocrotizone cream for a month now for ongoing burning sensation, had similar episode last year which was treated hydrocortizone. Patient came in today because developed dry scaly shiny skin and has began to develop skin breaks over the joints so came in for pain.

## 2020-11-05 NOTE — ED PROVIDER NOTE - PHYSICAL EXAMINATION
VITALS: Reviewed  CONSTITUTIONAL: well developed, well nourished, in no acute distress, speaking in full sentences, nontoxic appearing  SKIN: warm, dry--scaly rash bilaterally to the right hand and left hand, patient has dry shiny skin over the right hand, skin breaks over the joints, cap refill <2 seconds, radial pulse intact  HEAD: normocephalic, atraumatic  NECK: supple, no masses  CV:  regular rate, regular rhythm, 2+ radial pulses bilaterally  RESP: no wheezes, no rales, no rhonchi, normal work of breathing  ABD: soft, nontender, nondistended, no rebound, no guarding  MSK: normal ROM, no cyanosis, no edema  NEURO: alert, oriented x3  PSYCH: cooperative, appropriate

## 2020-11-05 NOTE — ED PROVIDER NOTE - NS ED ROS FT
Review of Systems:  CONSTITUTIONAL - No fever  SKIN - No rash  HEMATOLOGIC - No abnormal bleeding or bruising  RESPIRATORY - No shortness of breath, No cough  CARDIAC -No chest pain, No palpitations  GI - No abdominal pain, No nausea, No vomiting, No diarrhea  - No dysuria, frequency, hematuria.  MUSCULOSKELETAL - No swelling, No back pain  NEUROLOGIC - No numbness, No focal weakness, No headache, No dizziness  All other systems negative, unless specified in HPI

## 2020-11-05 NOTE — ED ADULT TRIAGE NOTE - CHIEF COMPLAINT QUOTE
patient reports right hand pain hx of arthritis using hydrocortisone cream no relief no sweling no bruising no deformity

## 2020-11-24 ENCOUNTER — APPOINTMENT (OUTPATIENT)
Dept: NEUROLOGY | Facility: CLINIC | Age: 55
End: 2020-11-24
Payer: MEDICARE

## 2020-11-24 ENCOUNTER — OUTPATIENT (OUTPATIENT)
Dept: OUTPATIENT SERVICES | Facility: HOSPITAL | Age: 55
LOS: 1 days | Discharge: HOME | End: 2020-11-24

## 2020-11-24 VITALS
DIASTOLIC BLOOD PRESSURE: 82 MMHG | HEART RATE: 85 BPM | OXYGEN SATURATION: 98 % | TEMPERATURE: 97.8 F | SYSTOLIC BLOOD PRESSURE: 121 MMHG

## 2020-11-24 DIAGNOSIS — Z98.890 OTHER SPECIFIED POSTPROCEDURAL STATES: Chronic | ICD-10-CM

## 2020-11-24 PROCEDURE — 99213 OFFICE O/P EST LOW 20 MIN: CPT | Mod: GC

## 2020-11-24 NOTE — PHYSICAL EXAM
[General Appearance - Alert] : alert [General Appearance - In No Acute Distress] : in no acute distress [Oriented To Time, Place, And Person] : oriented to person, place, and time [Impaired Insight] : insight and judgment were intact [Affect] : the affect was normal [Person] : oriented to person [Place] : oriented to place [Time] : oriented to time [Concentration Intact] : normal concentrating ability [Visual Intact] : visual attention was ~T not ~L decreased [Naming Objects] : no difficulty naming common objects [Repeating Phrases] : no difficulty repeating a phrase [Writing A Sentence] : no difficulty writing a sentence [Fluency] : fluency intact [Comprehension] : comprehension intact [Reading] : reading intact [Past History] : adequate knowledge of personal past history [Cranial Nerves Optic (II)] : visual acuity intact bilaterally,  visual fields full to confrontation, pupils equal round and reactive to light [Cranial Nerves Oculomotor (III)] : extraocular motion intact [Cranial Nerves Trigeminal (V)] : facial sensation intact symmetrically [Cranial Nerves Facial (VII)] : face symmetrical [Cranial Nerves Vestibulocochlear (VIII)] : hearing was intact bilaterally [Cranial Nerves Glossopharyngeal (IX)] : tongue and palate midline [Cranial Nerves Accessory (XI - Cranial And Spinal)] : head turning and shoulder shrug symmetric [Cranial Nerves Hypoglossal (XII)] : there was no tongue deviation with protrusion [Motor Tone] : muscle tone was normal in all four extremities [Motor Strength] : muscle strength was normal in all four extremities [No Muscle Atrophy] : normal bulk in all four extremities [Sensation Tactile Decrease] : light touch was intact [Abnormal Walk] : normal gait [Balance] : balance was intact [2+] : Ankle jerk left 2+ [Outer Ear] : the ears and nose were normal in appearance [Oropharynx] : the oropharynx was normal [Neck Appearance] : the appearance of the neck was normal [Neck Cervical Mass (___cm)] : no neck mass was observed [Jugular Venous Distention Increased] : there was no jugular-venous distention [Thyroid Diffuse Enlargement] : the thyroid was not enlarged [Thyroid Nodule] : there were no palpable thyroid nodules [Auscultation Breath Sounds / Voice Sounds] : lungs were clear to auscultation bilaterally [Heart Rate And Rhythm] : heart rate was normal and rhythm regular [Heart Sounds] : normal S1 and S2 [Heart Sounds Gallop] : no gallops [Murmurs] : no murmurs [Heart Sounds Pericardial Friction Rub] : no pericardial rub [Bowel Sounds] : normal bowel sounds [Abdomen Soft] : soft [Abdomen Tenderness] : non-tender [Abdomen Mass (___ Cm)] : no abdominal mass palpated [Skin Color & Pigmentation] : normal skin color and pigmentation [Skin Turgor] : normal skin turgor [] : no rash [Past-pointing] : there was no past-pointing [Tremor] : no tremor present [Plantar Reflex Right Only] : normal on the right [Plantar Reflex Left Only] : normal on the left

## 2020-11-24 NOTE — ASSESSMENT
[FreeTextEntry1] : 56 y/o female with history of seizures presents for follow-up visit. \par \par #) History of seizures\par - Well controlled on keppra 750mg BID + divalproex 500 BID \par - Continue current dosages\par - Will order routine lab-work plus keppra and valproic acid levels\par - Return to clinic in 6 months or sooner if needed

## 2020-11-24 NOTE — HISTORY OF PRESENT ILLNESS
[FreeTextEntry1] : 56 y/o female presents to neurology clinic for follow-up of her previously diagnosed seizure disorder. Patient last seen in August 2019. Patient states that she has not experienced a seizure for at least 1.5 years and has had no trouble taking her valproic acid and levetiracetam. She feels well overall and reports no weakness, fatigue, numbness, tingling, sensory deficits. Currently taking divalproex 500mg BID as well as levetiracetam 750 BID.

## 2020-11-25 LAB
ALBUMIN SERPL ELPH-MCNC: 4.4 G/DL
ALP BLD-CCNC: 107 U/L
ALT SERPL-CCNC: 16 U/L
ANION GAP SERPL CALC-SCNC: 12 MMOL/L
AST SERPL-CCNC: 17 U/L
BASOPHILS # BLD AUTO: 0.04 K/UL
BASOPHILS NFR BLD AUTO: 0.5 %
BILIRUB SERPL-MCNC: 0.4 MG/DL
BUN SERPL-MCNC: 21 MG/DL
CALCIUM SERPL-MCNC: 9.8 MG/DL
CHLORIDE SERPL-SCNC: 104 MMOL/L
CO2 SERPL-SCNC: 27 MMOL/L
CREAT SERPL-MCNC: 0.9 MG/DL
EOSINOPHIL # BLD AUTO: 0.18 K/UL
EOSINOPHIL NFR BLD AUTO: 2.2 %
GLUCOSE SERPL-MCNC: 79 MG/DL
HCT VFR BLD CALC: 42.1 %
HGB BLD-MCNC: 13.6 G/DL
IMM GRANULOCYTES NFR BLD AUTO: 0.5 %
LYMPHOCYTES # BLD AUTO: 3.72 K/UL
LYMPHOCYTES NFR BLD AUTO: 44.8 %
MAN DIFF?: NORMAL
MCHC RBC-ENTMCNC: 31.5 PG
MCHC RBC-ENTMCNC: 32.3 G/DL
MCV RBC AUTO: 97.5 FL
MONOCYTES # BLD AUTO: 0.62 K/UL
MONOCYTES NFR BLD AUTO: 7.5 %
NEUTROPHILS # BLD AUTO: 3.71 K/UL
NEUTROPHILS NFR BLD AUTO: 44.5 %
PLATELET # BLD AUTO: 278 K/UL
POTASSIUM SERPL-SCNC: 4.5 MMOL/L
PROT SERPL-MCNC: 7.3 G/DL
RBC # BLD: 4.32 M/UL
RBC # FLD: 13.8 %
SODIUM SERPL-SCNC: 143 MMOL/L
VALPROATE SERPL-MCNC: 34 UG/ML
WBC # FLD AUTO: 8.31 K/UL

## 2020-11-30 LAB — LEVETIRACETAM SERPL-MCNC: 11.4 UG/ML

## 2020-12-01 ENCOUNTER — APPOINTMENT (OUTPATIENT)
Dept: PSYCHIATRY | Facility: CLINIC | Age: 55
End: 2020-12-01

## 2020-12-01 ENCOUNTER — OUTPATIENT (OUTPATIENT)
Dept: OUTPATIENT SERVICES | Facility: HOSPITAL | Age: 55
LOS: 1 days | Discharge: HOME | End: 2020-12-01
Payer: MEDICARE

## 2020-12-01 DIAGNOSIS — F25.0 SCHIZOAFFECTIVE DISORDER, BIPOLAR TYPE: ICD-10-CM

## 2020-12-01 DIAGNOSIS — Z98.890 OTHER SPECIFIED POSTPROCEDURAL STATES: Chronic | ICD-10-CM

## 2020-12-01 PROCEDURE — 99214 OFFICE O/P EST MOD 30 MIN: CPT | Mod: GC,95

## 2020-12-18 ENCOUNTER — APPOINTMENT (OUTPATIENT)
Dept: INTERNAL MEDICINE | Facility: CLINIC | Age: 55
End: 2020-12-18
Payer: MEDICARE

## 2020-12-18 ENCOUNTER — OUTPATIENT (OUTPATIENT)
Dept: OUTPATIENT SERVICES | Facility: HOSPITAL | Age: 55
LOS: 1 days | Discharge: HOME | End: 2020-12-18

## 2020-12-18 VITALS
HEART RATE: 86 BPM | HEIGHT: 66 IN | TEMPERATURE: 97.9 F | OXYGEN SATURATION: 99 % | WEIGHT: 259 LBS | SYSTOLIC BLOOD PRESSURE: 145 MMHG | DIASTOLIC BLOOD PRESSURE: 90 MMHG | BODY MASS INDEX: 41.62 KG/M2

## 2020-12-18 DIAGNOSIS — Z98.890 OTHER SPECIFIED POSTPROCEDURAL STATES: Chronic | ICD-10-CM

## 2020-12-18 DIAGNOSIS — Z00.00 ENCOUNTER FOR GENERAL ADULT MEDICAL EXAMINATION WITHOUT ABNORMAL FINDINGS: ICD-10-CM

## 2020-12-18 DIAGNOSIS — R73.9 HYPERGLYCEMIA, UNSPECIFIED: ICD-10-CM

## 2020-12-18 PROCEDURE — 99213 OFFICE O/P EST LOW 20 MIN: CPT | Mod: GC

## 2020-12-18 RX ORDER — HYDROCORTISONE 25 MG/G
2.5 CREAM TOPICAL 3 TIMES DAILY
Qty: 2 | Refills: 0 | Status: ACTIVE | COMMUNITY
Start: 2020-02-27 | End: 1900-01-01

## 2020-12-18 NOTE — PHYSICAL EXAM
[No Acute Distress] : no acute distress [Well Nourished] : well nourished [Well Developed] : well developed [Normal Sclera/Conjunctiva] : normal sclera/conjunctiva [No JVD] : no jugular venous distention [No Lymphadenopathy] : no lymphadenopathy [Supple] : supple [Thyroid Normal, No Nodules] : the thyroid was normal and there were no nodules present [No Respiratory Distress] : no respiratory distress  [Clear to Auscultation] : lungs were clear to auscultation bilaterally [Normal Rate] : normal rate  [Regular Rhythm] : with a regular rhythm [Normal S1, S2] : normal S1 and S2 [No Edema] : there was no peripheral edema [Soft] : abdomen soft [Non Tender] : non-tender [No HSM] : no HSM [Normal Bowel Sounds] : normal bowel sounds [No Joint Swelling] : no joint swelling [No Rash] : no rash [Coordination Grossly Intact] : coordination grossly intact [No Focal Deficits] : no focal deficits [Normal Gait] : normal gait [Normal Affect] : the affect was normal [Normal Insight/Judgement] : insight and judgment were intact

## 2020-12-21 PROBLEM — Z12.11 ENCOUNTER FOR SCREENING COLONOSCOPY: Status: RESOLVED | Noted: 2019-08-23 | Resolved: 2020-12-21

## 2020-12-28 ENCOUNTER — EMERGENCY (EMERGENCY)
Facility: HOSPITAL | Age: 55
LOS: 0 days | Discharge: HOME | End: 2020-12-29
Attending: EMERGENCY MEDICINE | Admitting: EMERGENCY MEDICINE
Payer: MEDICARE

## 2020-12-28 VITALS
HEIGHT: 66 IN | OXYGEN SATURATION: 99 % | SYSTOLIC BLOOD PRESSURE: 124 MMHG | DIASTOLIC BLOOD PRESSURE: 102 MMHG | WEIGHT: 265 LBS | RESPIRATION RATE: 18 BRPM | HEART RATE: 88 BPM | TEMPERATURE: 98 F

## 2020-12-28 DIAGNOSIS — R07.89 OTHER CHEST PAIN: ICD-10-CM

## 2020-12-28 DIAGNOSIS — Z20.828 CONTACT WITH AND (SUSPECTED) EXPOSURE TO OTHER VIRAL COMMUNICABLE DISEASES: ICD-10-CM

## 2020-12-28 DIAGNOSIS — Z87.891 PERSONAL HISTORY OF NICOTINE DEPENDENCE: ICD-10-CM

## 2020-12-28 DIAGNOSIS — F31.9 BIPOLAR DISORDER, UNSPECIFIED: ICD-10-CM

## 2020-12-28 DIAGNOSIS — Z91.018 ALLERGY TO OTHER FOODS: ICD-10-CM

## 2020-12-28 DIAGNOSIS — Z98.890 OTHER SPECIFIED POSTPROCEDURAL STATES: Chronic | ICD-10-CM

## 2020-12-28 DIAGNOSIS — R07.9 CHEST PAIN, UNSPECIFIED: ICD-10-CM

## 2020-12-28 DIAGNOSIS — K44.9 DIAPHRAGMATIC HERNIA WITHOUT OBSTRUCTION OR GANGRENE: ICD-10-CM

## 2020-12-28 DIAGNOSIS — R00.2 PALPITATIONS: ICD-10-CM

## 2020-12-28 DIAGNOSIS — Z88.0 ALLERGY STATUS TO PENICILLIN: ICD-10-CM

## 2020-12-28 DIAGNOSIS — R91.1 SOLITARY PULMONARY NODULE: ICD-10-CM

## 2020-12-28 LAB
ALBUMIN SERPL ELPH-MCNC: 4.1 G/DL — SIGNIFICANT CHANGE UP (ref 3.5–5.2)
ALP SERPL-CCNC: 83 U/L — SIGNIFICANT CHANGE UP (ref 30–115)
ALT FLD-CCNC: 16 U/L — SIGNIFICANT CHANGE UP (ref 0–41)
ANION GAP SERPL CALC-SCNC: 9 MMOL/L — SIGNIFICANT CHANGE UP (ref 7–14)
AST SERPL-CCNC: 39 U/L — SIGNIFICANT CHANGE UP (ref 0–41)
BASE EXCESS BLDV CALC-SCNC: 2.1 MMOL/L — HIGH (ref -2–2)
BASOPHILS # BLD AUTO: 0.04 K/UL — SIGNIFICANT CHANGE UP (ref 0–0.2)
BASOPHILS NFR BLD AUTO: 0.5 % — SIGNIFICANT CHANGE UP (ref 0–1)
BILIRUB SERPL-MCNC: 0.4 MG/DL — SIGNIFICANT CHANGE UP (ref 0.2–1.2)
BUN SERPL-MCNC: 20 MG/DL — SIGNIFICANT CHANGE UP (ref 10–20)
CA-I SERPL-SCNC: 1.23 MMOL/L — SIGNIFICANT CHANGE UP (ref 1.12–1.3)
CALCIUM SERPL-MCNC: 8.9 MG/DL — SIGNIFICANT CHANGE UP (ref 8.5–10.1)
CHLORIDE SERPL-SCNC: 101 MMOL/L — SIGNIFICANT CHANGE UP (ref 98–110)
CO2 SERPL-SCNC: 23 MMOL/L — SIGNIFICANT CHANGE UP (ref 17–32)
CREAT SERPL-MCNC: 1 MG/DL — SIGNIFICANT CHANGE UP (ref 0.7–1.5)
EOSINOPHIL # BLD AUTO: 0.22 K/UL — SIGNIFICANT CHANGE UP (ref 0–0.7)
EOSINOPHIL NFR BLD AUTO: 2.6 % — SIGNIFICANT CHANGE UP (ref 0–8)
GAS PNL BLDV: 142 MMOL/L — SIGNIFICANT CHANGE UP (ref 136–145)
GAS PNL BLDV: SIGNIFICANT CHANGE UP
GLUCOSE SERPL-MCNC: 92 MG/DL — SIGNIFICANT CHANGE UP (ref 70–99)
HCO3 BLDV-SCNC: 29 MMOL/L — SIGNIFICANT CHANGE UP (ref 22–29)
HCT VFR BLD CALC: 41.8 % — SIGNIFICANT CHANGE UP (ref 37–47)
HCT VFR BLDA CALC: 42.3 % — SIGNIFICANT CHANGE UP (ref 34–44)
HGB BLD CALC-MCNC: 13.8 G/DL — LOW (ref 14–18)
HGB BLD-MCNC: 13.3 G/DL — SIGNIFICANT CHANGE UP (ref 12–16)
IMM GRANULOCYTES NFR BLD AUTO: 0.2 % — SIGNIFICANT CHANGE UP (ref 0.1–0.3)
LACTATE BLDV-MCNC: 1.2 MMOL/L — SIGNIFICANT CHANGE UP (ref 0.5–1.6)
LYMPHOCYTES # BLD AUTO: 3.51 K/UL — HIGH (ref 1.2–3.4)
LYMPHOCYTES # BLD AUTO: 41.6 % — SIGNIFICANT CHANGE UP (ref 20.5–51.1)
MCHC RBC-ENTMCNC: 30.6 PG — SIGNIFICANT CHANGE UP (ref 27–31)
MCHC RBC-ENTMCNC: 31.8 G/DL — LOW (ref 32–37)
MCV RBC AUTO: 96.3 FL — SIGNIFICANT CHANGE UP (ref 81–99)
MONOCYTES # BLD AUTO: 0.59 K/UL — SIGNIFICANT CHANGE UP (ref 0.1–0.6)
MONOCYTES NFR BLD AUTO: 7 % — SIGNIFICANT CHANGE UP (ref 1.7–9.3)
NEUTROPHILS # BLD AUTO: 4.05 K/UL — SIGNIFICANT CHANGE UP (ref 1.4–6.5)
NEUTROPHILS NFR BLD AUTO: 48.1 % — SIGNIFICANT CHANGE UP (ref 42.2–75.2)
NRBC # BLD: 0 /100 WBCS — SIGNIFICANT CHANGE UP (ref 0–0)
PCO2 BLDV: 56 MMHG — HIGH (ref 41–51)
PH BLDV: 7.33 — SIGNIFICANT CHANGE UP (ref 7.26–7.43)
PLATELET # BLD AUTO: 268 K/UL — SIGNIFICANT CHANGE UP (ref 130–400)
PO2 BLDV: 28 MMHG — SIGNIFICANT CHANGE UP (ref 20–40)
POTASSIUM BLDV-SCNC: 4.2 MMOL/L — SIGNIFICANT CHANGE UP (ref 3.3–5.6)
POTASSIUM SERPL-MCNC: 6.5 MMOL/L — CRITICAL HIGH (ref 3.5–5)
POTASSIUM SERPL-SCNC: 6.5 MMOL/L — CRITICAL HIGH (ref 3.5–5)
PROT SERPL-MCNC: 7.7 G/DL — SIGNIFICANT CHANGE UP (ref 6–8)
RBC # BLD: 4.34 M/UL — SIGNIFICANT CHANGE UP (ref 4.2–5.4)
RBC # FLD: 13.6 % — SIGNIFICANT CHANGE UP (ref 11.5–14.5)
SAO2 % BLDV: 45 % — SIGNIFICANT CHANGE UP
SARS-COV-2 RNA SPEC QL NAA+PROBE: SIGNIFICANT CHANGE UP
SODIUM SERPL-SCNC: 133 MMOL/L — LOW (ref 135–146)
TROPONIN T SERPL-MCNC: <0.01 NG/ML — SIGNIFICANT CHANGE UP
TROPONIN T SERPL-MCNC: <0.01 NG/ML — SIGNIFICANT CHANGE UP
WBC # BLD: 8.43 K/UL — SIGNIFICANT CHANGE UP (ref 4.8–10.8)
WBC # FLD AUTO: 8.43 K/UL — SIGNIFICANT CHANGE UP (ref 4.8–10.8)

## 2020-12-28 PROCEDURE — 93010 ELECTROCARDIOGRAM REPORT: CPT | Mod: 77

## 2020-12-28 PROCEDURE — 71045 X-RAY EXAM CHEST 1 VIEW: CPT | Mod: 26

## 2020-12-28 PROCEDURE — 99220: CPT

## 2020-12-28 PROCEDURE — 93010 ELECTROCARDIOGRAM REPORT: CPT

## 2020-12-28 RX ORDER — OLANZAPINE 15 MG/1
15 TABLET, FILM COATED ORAL
Refills: 0 | Status: DISCONTINUED | OUTPATIENT
Start: 2020-12-28 | End: 2020-12-29

## 2020-12-28 RX ORDER — METOPROLOL TARTRATE 50 MG
50 TABLET ORAL ONCE
Refills: 0 | Status: COMPLETED | OUTPATIENT
Start: 2020-12-28 | End: 2020-12-28

## 2020-12-28 RX ORDER — ASPIRIN/CALCIUM CARB/MAGNESIUM 324 MG
325 TABLET ORAL ONCE
Refills: 0 | Status: COMPLETED | OUTPATIENT
Start: 2020-12-28 | End: 2020-12-28

## 2020-12-28 RX ORDER — LEVETIRACETAM 250 MG/1
750 TABLET, FILM COATED ORAL
Refills: 0 | Status: DISCONTINUED | OUTPATIENT
Start: 2020-12-28 | End: 2020-12-29

## 2020-12-28 RX ORDER — ASPIRIN/CALCIUM CARB/MAGNESIUM 324 MG
325 TABLET ORAL DAILY
Refills: 0 | Status: DISCONTINUED | OUTPATIENT
Start: 2020-12-28 | End: 2020-12-28

## 2020-12-28 RX ORDER — DIVALPROEX SODIUM 500 MG/1
500 TABLET, DELAYED RELEASE ORAL
Refills: 0 | Status: DISCONTINUED | OUTPATIENT
Start: 2020-12-28 | End: 2020-12-29

## 2020-12-28 RX ADMIN — Medication 325 MILLIGRAM(S): at 17:01

## 2020-12-28 RX ADMIN — OLANZAPINE 15 MILLIGRAM(S): 15 TABLET, FILM COATED ORAL at 20:30

## 2020-12-28 RX ADMIN — DIVALPROEX SODIUM 500 MILLIGRAM(S): 500 TABLET, DELAYED RELEASE ORAL at 20:30

## 2020-12-28 NOTE — ED PROVIDER NOTE - ATTENDING CONTRIBUTION TO CARE
55F PMH sz on depakote, p/w substernal chest pressure intermittent nonradiating x 4 days. present at rest and with exertion. sob/michelle assoc. no fever, cough. no tearing back pain. no trauma. no le edema, le pain, immobilization, hormones, hemoptysis. exsmoker. no prior cardiac work up or fam hx.     on exam, AFVSS, well kamryn nad, ncat, eomi, perrla, mmm, lctab, rrr nl s1s2 no mrg, abd soft ntnd, aaox3, no focal deficits, no le edema or calf ttp,     a/p; CP r/o acs, will do labs, ekg/trop, cxr, asa, tele re-eval

## 2020-12-28 NOTE — ED ADULT NURSE NOTE - SUICIDE SCREENING QUESTION 2
[facility]  [address] [address]  Phone:  [phone]                                    Adilson Thompson   MRN: [id number]    Department:  [facility]   Date of Visit:  8/16/2019           After Visit Summary Signature Page    I have received my discharge instructions, and my questions have been answered. I have discussed any challenges I see with this plan with the nurse or doctor.    ..........................................................................................................................................  Patient/Patient Representative Signature      ..........................................................................................................................................  Patient Representative Print Name and Relationship to Patient    ..................................................               ................................................  Date                                   Time    ..........................................................................................................................................  Reviewed by Signature/Title    ...................................................              ..............................................  Date                                               Time          22EPIC Rev 08/18       
No

## 2020-12-28 NOTE — ED CDU PROVIDER INITIAL DAY NOTE - OBJECTIVE STATEMENT
55F with pmh of obesity, psoriasis, seizures, former smoker presents with CP, moderate, intermittent, nonradiating, worse with exertion, improved with rest, now resolved. Denies fever, chills, cough, LE pain or swelling, travel, hemoptysis. 55F with pmh of obesity, psoriasis, seizures, former smoker presents with CP, moderate, intermittent, nonradiating, worse with exertional moving of boxes, improved with rest, now resolved. Denies fever, chills, cough, LE pain or swelling, travel, hemoptysis.

## 2020-12-28 NOTE — ED PROVIDER NOTE - OBJECTIVE STATEMENT
54 yo female w hx of obesity, bipolar disorder, seizure disorder, psoriatic arthritis, former smoker presents to the ED for evaluation of chest pain x 4 days. Pain is mid-sternal, non-radiating, pressure-like, accompanied by shortness of breath and palpitations, noted while moving boxes around her home the last few days. Pain is not pleuritic or position, not worse with palpation. Denies fevers/chills, URI sx, cough, wheezing, syncope, diaphoresis, n/v, abd pain, leg pain/swelling, recent travel/surgeries/immobilizations/trauma. Denies history or fhx of MI/CAD/DVT/PE. Previously saw cardiology, unknown who, >1 year ago. no recent provocative testing.

## 2020-12-28 NOTE — ED PROVIDER NOTE - NS ED ROS FT
CONSTITUTIONAL: (-) fevers, (-) chills, (-) malaise, (-) decreased appetite  EYES: (-) vision changes, (-) blurry vision  ENT: (-) congestion, (-) rhinorrhea, (-) sore throat  NECK: (-) neck pain, (-) neck stiffness  CARDIO: (+) chest pain, (+) palpitations, (-) edema  PULM: (-) cough, (-) sputum, (-) chest tightness, (+) shortness of breath, (-) wheezing, (-) hemoptysis, (-) stridor  GI: (-) nausea, (-) vomiting, (-) diarrhea, (-) constipation, (-) abdominal pain, (-) melena, (-) hematochezia, (-) rectal bleeding  : (-) dysuria, (-) hematuria, (-) frequency, (-) urgency, (-) flank pain  ENDO: (-) polyuria, (-) polydipsia, (-) polyphagia  HEME: (-) easy bruising, (-) easy bleeding  MSK: (-) back pain, (-) myalgias, (-) gait difficulty  SKIN: (-) rashes, (-) pallor  NEURO: (-) headache, (-) dizziness, (-) lightheadedness, (-) weakness, (-) syncope    *all other systems negative except as documented above and in the HPI*

## 2020-12-28 NOTE — ED PROVIDER NOTE - PHYSICAL EXAMINATION
VITALS:  I have reviewed the initial vital signs.  GENERAL: Well-developed, overweight female, in no acute distress. Nontoxic.  HEENT: Sclera clear. EOMI, PERRLA. Mucous membranes moist.  NECK: supple w FROM. No JVD.  CARDIO: RRR, nl S1 and S2. No murmurs, rubs, or gallops. No peripheral edema. 2+ radial and pedal pulses bilaterally. No chest wall tenderness.  PULM: Normal effort. CTA b/l without wheezes, rales, or rhonchi.  MSK: No leg warmth, swelling, erythema, or ttp.  GI: Abdomen soft and non-distended. Nontender.  SKIN: Warm, dry. No pallor. No rash.  NEURO: A&Ox3. Speech clear. No focal deficits.  PSYCH: Calm and cooperative.

## 2020-12-28 NOTE — ED CDU PROVIDER INITIAL DAY NOTE - PHYSICAL EXAMINATION
CONSTITUTIONAL: in no acute distress.   SKIN: warm, dry  HEAD: Normocephalic.  EYES: PERRL.  ENT: Airway clear.  NECK: Supple.  LYMPH: No acute cervical adenopathy.  CARD: Regular rate and rhythm.   RESP: No wheezing, rales or rhonchi.  ABD: soft ntnd  EXT: No clubbing, cyanosis. No LE TTP or swelling.  NEURO: Alert, oriented.  PSYCH: Cooperative, appropriate.

## 2020-12-28 NOTE — ED CDU PROVIDER INITIAL DAY NOTE - MEDICAL DECISION MAKING DETAILS
Patient to remain on continuous telemetry.  For repeat cardiac enzymes and repeat EKG.  Likely provocative testing in the AM.

## 2020-12-28 NOTE — ED ADULT NURSE NOTE - SUICIDE SCREENING QUESTION 3
Up graded meter from Revuze brand to a one touch verio flex  Follow 1900 kcal/d meal plan @40% Carbohydrate  Add lean protein to carb  Read labels for Total Carb  Continue PNV  
No

## 2020-12-28 NOTE — ED CDU PROVIDER INITIAL DAY NOTE - PROGRESS NOTE DETAILS
received signout from Dr. Cristobal - pt in obs for futher evauation of chest pain; 1st ce/ekg wnl; 2nd trop sent received; pt to have ccta in am; will continue to monitor;

## 2020-12-29 VITALS
TEMPERATURE: 97 F | OXYGEN SATURATION: 98 % | SYSTOLIC BLOOD PRESSURE: 109 MMHG | HEART RATE: 62 BPM | DIASTOLIC BLOOD PRESSURE: 61 MMHG | RESPIRATION RATE: 18 BRPM

## 2020-12-29 PROCEDURE — 99217: CPT

## 2020-12-29 PROCEDURE — 75574 CT ANGIO HRT W/3D IMAGE: CPT | Mod: 26

## 2020-12-29 RX ORDER — SODIUM CHLORIDE 9 MG/ML
1000 INJECTION INTRAMUSCULAR; INTRAVENOUS; SUBCUTANEOUS ONCE
Refills: 0 | Status: COMPLETED | OUTPATIENT
Start: 2020-12-29 | End: 2020-12-29

## 2020-12-29 RX ORDER — METOPROLOL TARTRATE 50 MG
50 TABLET ORAL ONCE
Refills: 0 | Status: COMPLETED | OUTPATIENT
Start: 2020-12-29 | End: 2020-12-29

## 2020-12-29 RX ADMIN — SODIUM CHLORIDE 1000 MILLILITER(S): 9 INJECTION INTRAMUSCULAR; INTRAVENOUS; SUBCUTANEOUS at 08:33

## 2020-12-29 RX ADMIN — OLANZAPINE 15 MILLIGRAM(S): 15 TABLET, FILM COATED ORAL at 06:27

## 2020-12-29 RX ADMIN — DIVALPROEX SODIUM 500 MILLIGRAM(S): 500 TABLET, DELAYED RELEASE ORAL at 06:27

## 2020-12-29 RX ADMIN — Medication 50 MILLIGRAM(S): at 06:27

## 2020-12-29 RX ADMIN — LEVETIRACETAM 750 MILLIGRAM(S): 250 TABLET, FILM COATED ORAL at 06:27

## 2020-12-29 NOTE — ED CDU PROVIDER SUBSEQUENT DAY NOTE - MEDICAL DECISION MAKING DETAILS
2nd troponin negative.  Non ischemic EKG.  Will optimize HR for CCTA in the AM.  Beta blockers given.

## 2020-12-29 NOTE — ED CDU PROVIDER DISPOSITION NOTE - NSFOLLOWUPINSTRUCTIONS_ED_ALL_ED_FT
Follow up with your PMD - Dr Cottrell in 1 week  Continue all your home medications  Return to the ED if your symptoms worsen/return

## 2020-12-29 NOTE — ED CDU PROVIDER DISPOSITION NOTE - PATIENT PORTAL LINK FT
You can access the FollowMyHealth Patient Portal offered by [facility] by registering at the following website: [email or web address]. By joining [employer]’s FollowMyHealth portal, you will also be able to view your health information using other applications (apps) compatible with our system.

## 2020-12-29 NOTE — ED CDU PROVIDER DISPOSITION NOTE - CARE PROVIDER_API CALL
Jim Cottrell (DO)  Medicine  Physicians  [address] [place] [address]  Phone: [phone]  Fax: [phone]  Follow Up Time: 4-6 Days

## 2020-12-29 NOTE — CHART NOTE - NSCHARTNOTEFT_GEN_A_CORE
Pt will go home at the point of discharge.  Pt currently has no known needs.  Pt wants to take a bus to transport home.  Case management continues to be available if needs arise prior to discharge.

## 2020-12-29 NOTE — ED CDU PROVIDER DISPOSITION NOTE - CLINICAL COURSE
Patient had no events on telemetry.  Normal cardiac enzymes and normal EKG.  She had CT Coronary Angiogram that showed normal coronary arteries with a calcium score of 0.  (+) Hiatal Hernia, possible cause of her symptoms.  Stable for D/C.  F/U with PMD/GI.  Strict return instructions discussed.

## 2020-12-29 NOTE — ED CDU PROVIDER SUBSEQUENT DAY NOTE - PROGRESS NOTE DETAILS
Pt seen at bedside, NAD. Arrived overnight, received from SHAREE Aleman. Pt presenting for chest pain s/p moving boxes. Cardiac enzymes negative x 2. Covid negative. Pt scheduled for CCTA this am, will follow up with results Informed patient of hiatal hernia and 7mm pulmonary nodule. Patient understands and will follow up with PMD

## 2020-12-31 PROBLEM — R73.9 HYPERGLYCEMIA: Status: ACTIVE | Noted: 2017-08-30

## 2020-12-31 NOTE — ASSESSMENT
[FreeTextEntry1] : 54 year old female with PMHx of bipolar disorder, seizure disorder, and thyroid Ca s/p thyroidectomy is here for follow up visit. patient has history of infected sebaceus cyst in the breast, s/p ID with cultures sensitive to Bactrim and has been following with breast surgeon Dr. Borjas last visit 1/2020. patient states it's painful with site darning clear fluid. no fever or chills. patient also endorses worsening eczema qi of the bilateral hands.\par \par \par # Breast infected inclusion cyst: resolved \par - finished Bactrim course and seen by breast surgery in Feb 2020 \par \par \par # Hx of papillary thyroid Ca, s/p partial thyroidectomy:\par  - Need repeat TSH today \par \par \par # Eczema: stable \par -  given refill of hydrocortisone 2.5 % cream \par \par \par # Seizure disorder: \par - Seen by neurology Nov 2020 w/ f/u in 6 months \par - refills for keppra 750mg BID + divalproex 500 BID \par \par \par # Bipolar disorder, stable:\par - On Olanzapine 15 mg daily, Psych f/u (Dr. Bearden) \par \par \par # Recent left ulna/radius fracture s/p ORIF:\par  - stable\par \par \par # HCM\par - Mammography 2019 BIRADS 1, given new rx for mammogram \par - Colonoscopy 2019 with 10 mm adenoma, repeat in 3 years\par - Flu Vaccine: [facility] in Fall 2020 \par - GYN: s/p TAHBSO, f/u with GYN for annual exam (due March 2021)\par - Bone Density aug 2020: Repeat in 2 years \par - RTC in 6 months, repeat blood work today

## 2020-12-31 NOTE — REVIEW OF SYSTEMS
[Skin Rash] : skin rash [Fever] : no fever [Night Sweats] : no night sweats [Vision Problems] : no vision problems [Chest Pain] : no chest pain [Lower Ext Edema] : no lower extremity edema [Shortness Of Breath] : no shortness of breath [Wheezing] : no wheezing [Cough] : no cough [Abdominal Pain] : no abdominal pain [Nausea] : no nausea [Constipation] : no constipation [Diarrhea] : diarrhea [Vomiting] : no vomiting [Heartburn] : no heartburn [Melena] : no melena [Dysuria] : no dysuria [Hematuria] : no hematuria [Joint Pain] : no joint pain [Headache] : no headache [Fainting] : no fainting [de-identified] : eczema of bilateral hands

## 2020-12-31 NOTE — HISTORY OF PRESENT ILLNESS
[FreeTextEntry1] : Follow-Up Visit  [de-identified] : 55 year old female with PMHx of  breast cyst (resolved), bipolar disorder, seizure disorder, and thyroid Ca s/p thyroidectomy is here for follow up visit. Patient has no complaints to offer at this time. She is overall feeling well. Denies smoking and drinking. Requesting refills on all her medications.

## 2021-01-05 ENCOUNTER — APPOINTMENT (OUTPATIENT)
Dept: PSYCHIATRY | Facility: CLINIC | Age: 56
End: 2021-01-05
Payer: MEDICARE

## 2021-01-05 ENCOUNTER — OUTPATIENT (OUTPATIENT)
Dept: OUTPATIENT SERVICES | Facility: HOSPITAL | Age: 56
LOS: 1 days | Discharge: HOME | End: 2021-01-05

## 2021-01-05 DIAGNOSIS — F25.0 SCHIZOAFFECTIVE DISORDER, BIPOLAR TYPE: ICD-10-CM

## 2021-01-05 DIAGNOSIS — Z98.890 OTHER SPECIFIED POSTPROCEDURAL STATES: Chronic | ICD-10-CM

## 2021-01-05 PROCEDURE — 99443: CPT | Mod: GC

## 2021-01-19 LAB
25(OH)D3 SERPL-MCNC: 10 NG/ML
ALBUMIN SERPL ELPH-MCNC: 4.1 G/DL
ALP BLD-CCNC: 109 U/L
ALT SERPL-CCNC: 15 U/L
ANION GAP SERPL CALC-SCNC: 12 MMOL/L
AST SERPL-CCNC: 22 U/L
BASOPHILS # BLD AUTO: 0.04 K/UL
BASOPHILS NFR BLD AUTO: 0.5 %
BILIRUB SERPL-MCNC: 0.4 MG/DL
BUN SERPL-MCNC: 13 MG/DL
CALCIUM SERPL-MCNC: 9.5 MG/DL
CHLORIDE SERPL-SCNC: 104 MMOL/L
CHOLEST SERPL-MCNC: 185 MG/DL
CO2 SERPL-SCNC: 23 MMOL/L
CREAT SERPL-MCNC: 0.8 MG/DL
EOSINOPHIL # BLD AUTO: 0.21 K/UL
EOSINOPHIL NFR BLD AUTO: 2.5 %
ESTIMATED AVERAGE GLUCOSE: 123 MG/DL
GLUCOSE SERPL-MCNC: 91 MG/DL
HBA1C MFR BLD HPLC: 5.9 %
HCT VFR BLD CALC: 41.8 %
HDLC SERPL-MCNC: 65 MG/DL
HGB BLD-MCNC: 13.3 G/DL
IMM GRANULOCYTES NFR BLD AUTO: 0.4 %
LDLC SERPL CALC-MCNC: 111 MG/DL
LYMPHOCYTES # BLD AUTO: 3.62 K/UL
LYMPHOCYTES NFR BLD AUTO: 42.8 %
MAN DIFF?: NORMAL
MCHC RBC-ENTMCNC: 30.1 PG
MCHC RBC-ENTMCNC: 31.8 G/DL
MCV RBC AUTO: 94.6 FL
MONOCYTES # BLD AUTO: 0.46 K/UL
MONOCYTES NFR BLD AUTO: 5.4 %
NEUTROPHILS # BLD AUTO: 4.09 K/UL
NEUTROPHILS NFR BLD AUTO: 48.4 %
NONHDLC SERPL-MCNC: 120 MG/DL
PLATELET # BLD AUTO: 282 K/UL
POTASSIUM SERPL-SCNC: 4.3 MMOL/L
PROT SERPL-MCNC: 7.4 G/DL
RBC # BLD: 4.42 M/UL
RBC # FLD: 13.3 %
SARS-COV-2 IGG SERPL IA-ACNC: <0.1 INDEX
SARS-COV-2 IGG SERPL QL IA: NEGATIVE
SODIUM SERPL-SCNC: 139 MMOL/L
TRIGL SERPL-MCNC: 91 MG/DL
TSH SERPL-ACNC: 0.91 UIU/ML
WBC # FLD AUTO: 8.45 K/UL

## 2021-01-26 ENCOUNTER — EMERGENCY (EMERGENCY)
Facility: HOSPITAL | Age: 56
LOS: 0 days | Discharge: HOME | End: 2021-01-26
Attending: EMERGENCY MEDICINE | Admitting: EMERGENCY MEDICINE
Payer: MEDICARE

## 2021-01-26 VITALS
OXYGEN SATURATION: 97 % | DIASTOLIC BLOOD PRESSURE: 81 MMHG | HEART RATE: 93 BPM | HEIGHT: 66 IN | WEIGHT: 240.08 LBS | TEMPERATURE: 99 F | RESPIRATION RATE: 18 BRPM | SYSTOLIC BLOOD PRESSURE: 118 MMHG

## 2021-01-26 DIAGNOSIS — Z98.890 OTHER SPECIFIED POSTPROCEDURAL STATES: Chronic | ICD-10-CM

## 2021-01-26 DIAGNOSIS — Z88.0 ALLERGY STATUS TO PENICILLIN: ICD-10-CM

## 2021-01-26 DIAGNOSIS — L02.31 CUTANEOUS ABSCESS OF BUTTOCK: ICD-10-CM

## 2021-01-26 DIAGNOSIS — L02.91 CUTANEOUS ABSCESS, UNSPECIFIED: ICD-10-CM

## 2021-01-26 DIAGNOSIS — Z91.018 ALLERGY TO OTHER FOODS: ICD-10-CM

## 2021-01-26 DIAGNOSIS — N76.4 ABSCESS OF VULVA: ICD-10-CM

## 2021-01-26 PROCEDURE — 99283 EMERGENCY DEPT VISIT LOW MDM: CPT

## 2021-01-26 RX ORDER — CHLORHEXIDINE GLUCONATE 213 G/1000ML
1 SOLUTION TOPICAL
Qty: 5 | Refills: 0
Start: 2021-01-26 | End: 2021-01-30

## 2021-01-26 NOTE — ED PROVIDER NOTE - ATTENDING CONTRIBUTION TO CARE
I personally evaluated the patient. I reviewed the Resident’s or Physician Assistant’s note (as assigned above), and agree with the findings and plan except as documented in my note.  54 yo woman with history of recurrent microabscesses presents for painful lump to labia and buttock.  Both areas are small and very indurated.  They do not appear fluctuant.  Likely MRSA considering she has had these on her back and extremities as well.  Will need antibiotics, chlorhexidine and outpatient follow up.  Return for any new or worsening.  ID referral.

## 2021-01-26 NOTE — ED PROVIDER NOTE - CARE PROVIDER_API CALL
Grayson Balbuena  Infectious Disease  [address] [place] [address]  Phone: [phone]  Fax: [phone]  Follow Up Time: 1-3 Days

## 2021-01-26 NOTE — ED PROVIDER NOTE - PATIENT PORTAL LINK FT
You can access the FollowMyHealth Patient Portal offered by [facility] by registering at the following website: [email or web address]. By joining [employer]’s FollowMyHealth portal, you will also be able to view your health information using other applications (apps) compatible with our system.

## 2021-01-26 NOTE — ED PROVIDER NOTE - OBJECTIVE STATEMENT
54 yo female hx of psoriatic arthritis presenting with abscess on L labia and L buttocks for the past 2 weeks associated with mild pain and swelling, denies fever, discharge. Has had breast abscesses in the past that drained with no complications. 54 yo female hx of seizures on lamotrigene and depakote, psoriatic arthritis presenting with abscess on L labia and L buttocks for the past 2 weeks associated with mild pain and swelling, denies fever, discharge. Has had breast abscesses in the past that drained with no complications.

## 2021-01-26 NOTE — ED PROVIDER NOTE - PHYSICAL EXAMINATION
CONSTITUTIONAL: Well-developed; well-nourished; in no acute distress.   SKIN: warm, dry  HEAD: Normocephalic; atraumatic.  EYES: PERRL, EOMI, normal sclera and conjunctiva   ENT: No nasal discharge; airway clear.  NECK: Supple; non tender.  CARD: S1, S2 normal; no murmurs, gallops, or rubs. Regular rate and rhythm.   RESP: No wheezes, rales or rhonchi.  ABD: soft ntnd  : chaperoned with dr. caal and med student evy, 1 cm fluctuant area on L labia and 1 cm fluctuant area on L buttock with mild TTP and swelling. no discharge.   EXT: Normal ROM.  No clubbing, cyanosis or edema.   LYMPH: No acute cervical adenopathy.  NEURO: Alert, oriented, grossly unremarkable  PSYCH: Cooperative, appropriate. CONSTITUTIONAL: Well-developed; well-nourished; in no acute distress.   SKIN: abscesses as described below  HEAD: Normocephalic; atraumatic.  EYES: PERRL, EOMI, normal sclera and conjunctiva   ENT: No nasal discharge; airway clear.  NECK: Supple; non tender.  CARD: S1, S2 normal; no murmurs, gallops, or rubs. Regular rate and rhythm.   RESP: No wheezes, rales or rhonchi.  ABD: soft ntnd  : chaperoned with dr. caal and med student evy, 1 cm fluctuant area on L labia and 1 cm fluctuant area on L buttock with mild TTP and swelling. no discharge.   EXT: Normal ROM.  No clubbing, cyanosis or edema.   LYMPH: No acute cervical adenopathy.  NEURO: Alert, oriented, grossly unremarkable  PSYCH: Cooperative, appropriate.

## 2021-01-26 NOTE — ED PROVIDER NOTE - CLINICAL SUMMARY MEDICAL DECISION MAKING FREE TEXT BOX
56 yo woman with small painful area of swelling to labia and buttock.  No evidence of sepsis.  Well appearing.  And otherwise asymptomatic.  Has a history of recurrent abscesses and wanted to get treatment before worsened.  On eval, hard small tender areas noted to labia and buttock.  both about 1x1 cm.  Liklely MRSA related as she has had similar on back and extremities in the past.  Antibiotics and outpatient follow up.  But will return for any new or worsening.

## 2021-01-26 NOTE — ED PROVIDER NOTE - NS ED ROS FT
Constitutional:  see HPI  Head:  no headache, dizziness, loss of consciousness  Eyes:  no visual changes; no eye pain, redness, or discharge  ENMT:  no ear pain or discharge; no hearing problems; no mouth or throat sores or lesions; no throat pain  Cardiac: no chest pain, tachycardia or palpitations  Respiratory: no cough, wheezing, shortness of breath, chest tightness, or trouble breathing  GI: no nausea, vomiting, diarrhea or abdominal pain  :  no dysuria, frequency, or burning with urination; no change in urine output  MS: no myalgias, muscle weakness, joint pain,or  injury; no joint swelling  Neuro: no weakness; no numbness or tingling; no seizure  Skin:  abscess

## 2021-01-27 ENCOUNTER — NON-APPOINTMENT (OUTPATIENT)
Age: 56
End: 2021-01-27

## 2021-02-10 ENCOUNTER — APPOINTMENT (OUTPATIENT)
Dept: PSYCHIATRY | Facility: CLINIC | Age: 56
End: 2021-02-10

## 2021-03-02 ENCOUNTER — OUTPATIENT (OUTPATIENT)
Dept: OUTPATIENT SERVICES | Facility: HOSPITAL | Age: 56
LOS: 1 days | Discharge: HOME | End: 2021-03-02

## 2021-03-02 ENCOUNTER — APPOINTMENT (OUTPATIENT)
Dept: PSYCHIATRY | Facility: CLINIC | Age: 56
End: 2021-03-02
Payer: MEDICARE

## 2021-03-02 DIAGNOSIS — Z98.890 OTHER SPECIFIED POSTPROCEDURAL STATES: Chronic | ICD-10-CM

## 2021-03-02 DIAGNOSIS — F25.0 SCHIZOAFFECTIVE DISORDER, BIPOLAR TYPE: ICD-10-CM

## 2021-03-02 PROCEDURE — 99443: CPT | Mod: GC

## 2021-04-06 ENCOUNTER — APPOINTMENT (OUTPATIENT)
Dept: PSYCHIATRY | Facility: CLINIC | Age: 56
End: 2021-04-06

## 2021-06-01 ENCOUNTER — APPOINTMENT (OUTPATIENT)
Dept: PSYCHIATRY | Facility: CLINIC | Age: 56
End: 2021-06-01

## 2021-06-01 ENCOUNTER — OUTPATIENT (OUTPATIENT)
Dept: OUTPATIENT SERVICES | Facility: HOSPITAL | Age: 56
LOS: 1 days | Discharge: HOME | End: 2021-06-01

## 2021-06-01 DIAGNOSIS — Z98.890 OTHER SPECIFIED POSTPROCEDURAL STATES: Chronic | ICD-10-CM

## 2021-06-01 DIAGNOSIS — F25.0 SCHIZOAFFECTIVE DISORDER, BIPOLAR TYPE: ICD-10-CM

## 2021-06-14 ENCOUNTER — OUTPATIENT (OUTPATIENT)
Dept: OUTPATIENT SERVICES | Facility: HOSPITAL | Age: 56
LOS: 1 days | Discharge: HOME | End: 2021-06-14
Payer: MEDICARE

## 2021-06-14 ENCOUNTER — APPOINTMENT (OUTPATIENT)
Dept: PSYCHIATRY | Facility: CLINIC | Age: 56
End: 2021-06-14
Payer: MEDICARE

## 2021-06-14 ENCOUNTER — OUTPATIENT (OUTPATIENT)
Dept: OUTPATIENT SERVICES | Facility: HOSPITAL | Age: 56
LOS: 1 days | Discharge: HOME | End: 2021-06-14

## 2021-06-14 DIAGNOSIS — Z98.890 OTHER SPECIFIED POSTPROCEDURAL STATES: Chronic | ICD-10-CM

## 2021-06-14 DIAGNOSIS — F25.9 SCHIZOAFFECTIVE DISORDER, UNSPECIFIED: ICD-10-CM

## 2021-06-14 PROCEDURE — 93010 ELECTROCARDIOGRAM REPORT: CPT

## 2021-06-14 PROCEDURE — 99213 OFFICE O/P EST LOW 20 MIN: CPT | Mod: GC

## 2021-07-12 ENCOUNTER — APPOINTMENT (OUTPATIENT)
Dept: PSYCHIATRY | Facility: CLINIC | Age: 56
End: 2021-07-12

## 2021-07-13 ENCOUNTER — APPOINTMENT (OUTPATIENT)
Dept: INTERNAL MEDICINE | Facility: CLINIC | Age: 56
End: 2021-07-13
Payer: MEDICARE

## 2021-07-13 ENCOUNTER — OUTPATIENT (OUTPATIENT)
Dept: OUTPATIENT SERVICES | Facility: HOSPITAL | Age: 56
LOS: 1 days | Discharge: HOME | End: 2021-07-13

## 2021-07-13 VITALS
DIASTOLIC BLOOD PRESSURE: 107 MMHG | TEMPERATURE: 97.3 F | HEIGHT: 66 IN | SYSTOLIC BLOOD PRESSURE: 136 MMHG | OXYGEN SATURATION: 99 % | BODY MASS INDEX: 42.11 KG/M2 | HEART RATE: 80 BPM | WEIGHT: 262 LBS

## 2021-07-13 DIAGNOSIS — R73.03 PREDIABETES: ICD-10-CM

## 2021-07-13 DIAGNOSIS — Z98.890 OTHER SPECIFIED POSTPROCEDURAL STATES: Chronic | ICD-10-CM

## 2021-07-13 DIAGNOSIS — E66.01 MORBID (SEVERE) OBESITY DUE TO EXCESS CALORIES: ICD-10-CM

## 2021-07-13 PROCEDURE — 99212 OFFICE O/P EST SF 10 MIN: CPT | Mod: GC

## 2021-07-13 NOTE — PHYSICAL EXAM
[No Acute Distress] : no acute distress [No Respiratory Distress] : no respiratory distress  [Normal Rate] : normal rate  [No Edema] : there was no peripheral edema [Soft] : abdomen soft [No HSM] : no HSM [Speech Grossly Normal] : speech grossly normal

## 2021-07-13 NOTE — ASSESSMENT
[FreeTextEntry1] : # Hx of papillary thyroid Ca, s/p partial thyroidectomy:\par  - repeat TSH \par \par # Eczema: stable \par - resolved \par \par \par # Seizure disorder: \par on  keppra 750mg BID + divalproex 500 BID \par \par \par # Bipolar disorder, stable:\par - Olanzapine 15 mg daily, Psych f/u (Dr. Bearden) \par \par \par - Mammography 2019 BIRADS 1, given new rx for mammogram \par - Colonoscopy 2019 with 10 mm adenoma, repeat in 2022\par - GYN: s/p TAHBSO\par - Bone Density aug 2020: Repeat in 2 years \par - RTC in 6 months, repeat blood work\par

## 2021-07-26 ENCOUNTER — APPOINTMENT (OUTPATIENT)
Dept: PSYCHIATRY | Facility: CLINIC | Age: 56
End: 2021-07-26

## 2021-08-09 NOTE — ED PROCEDURE NOTE - NS ED APPLIED SPLINTS 1
STOIC -erika  For breathing, journaling, goal setting, working through fears.    Continue sertraline 100 mg daily  
Ace Wrap

## 2021-09-01 ENCOUNTER — NON-APPOINTMENT (OUTPATIENT)
Age: 56
End: 2021-09-01

## 2021-09-01 DIAGNOSIS — F25.9 SCHIZOAFFECTIVE DISORDER, UNSPECIFIED: ICD-10-CM

## 2021-09-01 RX ORDER — OLANZAPINE 20 MG/1
20 TABLET, FILM COATED ORAL
Qty: 45 | Refills: 0 | Status: DISCONTINUED | COMMUNITY
Start: 2021-03-02 | End: 2021-09-01

## 2021-11-01 ENCOUNTER — APPOINTMENT (OUTPATIENT)
Dept: PSYCHIATRY | Facility: CLINIC | Age: 56
End: 2021-11-01
Payer: MEDICARE

## 2021-11-01 ENCOUNTER — OUTPATIENT (OUTPATIENT)
Dept: OUTPATIENT SERVICES | Facility: HOSPITAL | Age: 56
LOS: 1 days | Discharge: HOME | End: 2021-11-01

## 2021-11-01 DIAGNOSIS — F25.9 SCHIZOAFFECTIVE DISORDER, UNSPECIFIED: ICD-10-CM

## 2021-11-01 DIAGNOSIS — Z98.890 OTHER SPECIFIED POSTPROCEDURAL STATES: Chronic | ICD-10-CM

## 2021-11-01 PROCEDURE — 99214 OFFICE O/P EST MOD 30 MIN: CPT | Mod: 95

## 2021-11-20 NOTE — ASU DISCHARGE PLAN (ADULT/PEDIATRIC) - PATIENT EDUCATION MATERIALS PROVIED
Occupational Therapy  Visit Type: initial evaluation  Precautions:  Medical precautions:  fall risk and cardiac precautions; standard precautions.    Lines:     Basic: IV, O2 and telemetry      Lines in chart and on patient reviewed, precautions maintained throughout session.  Hearing: no hearing deficits  Vision:     Current vision: wears glasses only for reading    SUBJECTIVE  Patient agreed to participate in therapy this date.  Pt reports \"I am really hungry  Patient / Family Goal: return home    OBJECTIVE     /53 HR 69  O2 99 NC 2.5 lLevel of consciousness: alert    Oriented to person, place and time   Hand Dominance: left  Range of Motion (measured in degrees unless otherwise indicated)  Shoulder:   - Flexion (180):      • Left:  90   Right:  90  Elbow/Forearm:   - Flexion (140-150):      • Left: WNL      • Right: WNL   - Extension (0):      • Left: WNL      • Right: WNL   - Supination (80):      • Left: WNL      • Right: WNL   - Pronation (80):      • Left: WNL      • Right: WNL  Strength (out of 5 unless otherwise indicated)  Shoulder:   - Flexion:      • Left: 3      • Right: 3  Elbow/Forearm:   - Flexion:      • Left: 4-;       • Right: 4-    - Extension:       • Left: 4-      • Right: 4-   Finger/Thumb:   - Finger Flexion:      • Left: 4      • Right: 4 and 4-  Gross : right  less then left    Transfers:    Assistive devices: gait belt and mechanical sit to stand lift   Chair to bed: maximal assist, type:    Activities of Daily Living (ADLs):  Eating:     Assist: modified independent and independent    Position: chair  Lower Body Dressing:     Assist: total assist - dependent     Position: chair    Footwear assistance: total assist - dependent     Footwear position: chair    Assist needed for: don/doff left sock and don/doff right sock  Toilet transfer:     Assist: maximal assist    Device: mechanical sit to stand lift  Toileting:     Assist: maximal assist    Assist needed for: perineal  hygiene      Interventions  Rehab Safety  Therapist donned the following PPE for this patient encounter:  Gloves, Surgical Mask and Face Shield    Therapy aide or other healthcare professional present during this patient encounter:   No  If yes, that healthcare professional wore the following PPE: Not Applicable    The patient was wearing a mask during this session:  No - the patient cannot tolerate      Skilled input: verbal instruction/cues and tactile instruction/cues  Verbal Consent: Writer verbally educated and received verbal consent for hand placement, positioning of patient, and techniques to be performed today from patient for therapist position for techniques and hand placement and palpation for techniques as described above and how they are pertinent to the patient's plan of care.        ASSESSMENT    Impairments: activity tolerance, decreased insight into deficits, range of motion and strength  Functional Limitations: functional mobility, bathing, toileting, functional transfers and dressing  Personal Occupations Profile Affected: functional mobility/transfers, lower body dressing, toileting/toilet hygiene, bathing/showering, home establishment/managements, meal preparation/cleanup, shopping, driving, community mobility     Discharge Recommendations:   Recommendations for Discharge: OT IL: Patient requires 24 HOUR assistance to perform mobility and/or ADLs safely,Patient is appropriate for daily Occupational Therapy               Skilled therapy is required to address these limitations in attempt to maximize the patient's independence.  Progress: slow progress, decreased activity tolerance  Clinical decision making: Complex/High - Patient has several limitations (5+), comorbidities and/or complexities, as noted in comprehensive assessment above, that impact their occupational profile.  Resulting in multiple treatment options and significant task modification consistent with high clinical decision making  complexity.    End of Session:   Location: in chair  Safety measures: alarm system in place/re-engaged, call light within reach and lines intact  Handoff to: nurse and nurse assistant  Education Provided:   Learning assessment:  - Primary learner: patient  - Are they ready to learn: yes  - Preferred learning style: verbal  - Barriers to learning: no barriers apparent at this time  Education provided during session:  - Receiving education: patient  - OT plan of care   - Results of above outlined education: Verbalizes understanding    PLAN  Suggestions for next session as indicated: OT Frequency: 3 days/week  Frequency Comments: 11/20 Angelina GIBSON OT 1/3    Interventions: activity tolerance training and functional transfer training  Agreement to plan and goals: patient agrees with goals and treatment plan      GOALS  Long Term Goals: (to be met by time of discharge from hospital)  Grooming: Patient will complete grooming tasks at sink contact guard or touching/steadying.  Status: progressing/ongoing  Upper body dressing: Patient will complete upper body dressing at bedside minimal assist.  Status: progressing/ongoing  Lower body dressing: Patient will complete lower body dressing at bedside minimal assist.  Status: progressing/ongoing  Toileting: Patient will complete toileting minimal assist.  Status: progressing/ongoing  Toilet transfer: Patient will complete toilet transfer with 2-wheeled walker, minimal assist.  Status: progressing/ongoing  Tub/shower transfer: Patient will complete tub/shower transfer with 2-wheeled walker minimal assist.  Status: progressing/ongoing        Documented in the chart in the following areas: Prior Level of Function. Assessment. Plan. Patient Education. Vitals.      Therapy procedure time and total treatment time can be found documented on the Time Entry flowsheet   Pre-printed instructions given for nerve block/pain management

## 2021-12-06 NOTE — PATIENT PROFILE ADULT. - NSTRANFUSIONOBJECTION_GEN_ALL_CORE_SIUH
[facility]   [address] [address]  [phone]  
Patient has no objection to blood transfusions.

## 2021-12-18 ENCOUNTER — EMERGENCY (EMERGENCY)
Facility: HOSPITAL | Age: 56
LOS: 0 days | Discharge: HOME | End: 2021-12-18
Attending: EMERGENCY MEDICINE | Admitting: EMERGENCY MEDICINE
Payer: MEDICARE

## 2021-12-18 VITALS
DIASTOLIC BLOOD PRESSURE: 60 MMHG | OXYGEN SATURATION: 98 % | SYSTOLIC BLOOD PRESSURE: 122 MMHG | HEIGHT: 66 IN | TEMPERATURE: 98 F | HEART RATE: 118 BPM | RESPIRATION RATE: 18 BRPM

## 2021-12-18 DIAGNOSIS — L02.213 CUTANEOUS ABSCESS OF CHEST WALL: ICD-10-CM

## 2021-12-18 DIAGNOSIS — Z88.0 ALLERGY STATUS TO PENICILLIN: ICD-10-CM

## 2021-12-18 DIAGNOSIS — Z98.890 OTHER SPECIFIED POSTPROCEDURAL STATES: Chronic | ICD-10-CM

## 2021-12-18 DIAGNOSIS — Z91.018 ALLERGY TO OTHER FOODS: ICD-10-CM

## 2021-12-18 PROCEDURE — 99282 EMERGENCY DEPT VISIT SF MDM: CPT

## 2021-12-18 NOTE — ED PROVIDER NOTE - OBJECTIVE STATEMENT
55 yo female, PMHx of DVT, seizures, presents with abscess on her abdomen, x1 week, worsening over time, no alleviating factors, associated with pain with palpation. Patient states it is recurrent. Denies fevers, chills, trauma.

## 2021-12-18 NOTE — ED PROVIDER NOTE - NS ED ROS FT
GEN:  no fever, no chills, no generalized weakness  MSK:  no joint pain, no edema  SKIN:  +abscess abdomen

## 2021-12-18 NOTE — ED PROVIDER NOTE - CARE PROVIDER_API CALL
Jim Cottrell (DO)  Medicine  Physicians  [address] [facility], [address] [place] [address]  Phone: [phone]  Fax: [phone]  Follow Up Time: 1-3 Days

## 2021-12-18 NOTE — ED PROVIDER NOTE - CHIEF COMPLAINT
The patient is a 56y Female complaining of abscess. Samples Given: Cetaphil moisturizer Detail Level: Zone Continue Regimen: Epiduo- apply thin layer to face nightly

## 2021-12-18 NOTE — ED PROVIDER NOTE - PHYSICAL EXAMINATION
CONSTITUTIONAL: Well-developed; well-nourished; in no acute distress.   SKIN: warm, dry, +0.7x0.7   HEAD: Normocephalic; atraumatic.  EYES: no conjunctival injection. PERRLA. EOMI.   ENT: No nasal discharge; airway clear.  NECK: Supple  EXT: Normal ROM.  No clubbing, cyanosis or edema.   NEURO: Alert, oriented, grossly unremarkable.  PSYCH: Cooperative, appropriate. CONSTITUTIONAL: Well-developed; well-nourished; in no acute distress.   SKIN: warm, dry, +0.5 x 0.5 draining pimple  HEAD: Normocephalic; atraumatic.  EYES: no conjunctival injection. PERRLA. EOMI.   ENT: No nasal discharge; airway clear.  NECK: Supple  EXT: Normal ROM.  No clubbing, cyanosis or edema.   NEURO: Alert, oriented, grossly unremarkable.  PSYCH: Cooperative, appropriate.

## 2021-12-18 NOTE — ED SUB INTERN NOTE - MEDICAL DECISION MAKING DETAILS
57 yo F w PMH of DVT, Bipolar I disorder, presents with a recurrence of gradual onset persistent gradually worsening and minimally painful pimple since 1 week ago that hasn't drained or bled since onset. POCUS showed almost no drainable pocket and no underlying cobble stoning and a drop of pus drained while performing the exam but nothing more. Pressure was applied but without further aspiration. Pt otherwise stable. Will plan for d/c home with return precautions and f/u w PMD as needed.

## 2021-12-18 NOTE — ED PROVIDER NOTE - PROGRESS NOTE DETAILS
CP: minimal discharge from area. ultrasound shows no cobblestoning and no drainable abscess. given strict return precautions and follow up with PMD. patient verbalized understanding and is agreeable to plan.

## 2021-12-18 NOTE — ED SUB INTERN NOTE - OBJECTIVE STATEMENT FT
55 yo F w PMH of DVT, Bipolar I disorder, presents with a recurrence of gradual onset minimally painful pimple since 1 week ago 55 yo F w PMH of DVT, Bipolar I disorder, presents with a recurrence of gradual onset persistent gradually worsening and minimally painful pimple since 1 week ago that hasn't drained or bled since onset. Reports hx of abscess/pimple in the same area that at times get drained and placed on abx. No fever or chills.

## 2021-12-18 NOTE — ED PROVIDER NOTE - ATTENDING CONTRIBUTION TO CARE
56 year old female, pmhx as documented presenting with 1 week of lower chest abscess x 1 week. Patient states it is recurrent and she has had these multiple times in the past - states they usually clear up with abx. Otherwise denies fevers or systemic symptoms.    Exam shows (+) 0.5 x 0.5 draining area of induration, likely a pimple in the lower middle chest. No drainable fluid collection    Given patient's hx will tx with abx, outpatient f/u.

## 2021-12-18 NOTE — ED PROVIDER NOTE - PATIENT PORTAL LINK FT
You can access the FollowMyHealth Patient Portal offered by [facility] by registering at the following website: [email or web address]. By joining [employer]’s FollowMyHealth portal, you will also be able to view your health information using other applications (apps) compatible with our system.

## 2021-12-18 NOTE — ED PROVIDER NOTE - CLINICAL SUMMARY MEDICAL DECISION MAKING FREE TEXT BOX
Patient presented with area of induration and fluctuance to lower chest area which she states has been draining. Otherwise afebrile, HD stable. Exam with (+) draining area of induration, likely pimple vs ingrown hair in lower chest, no breast involvement or drainable fluid collection. Given the above, will discharge home with PO abx and outpatient follow up. Patient agreeable with plan. Agrees to return to ED for any new or worsening symptoms.

## 2021-12-23 NOTE — PATIENT PROFILE ADULT. - MEDICATION ADMINISTRATION INFO, PROFILE
Post Acute Facility Update     *The information contained in this note was received during a weekly care coordination call with the post acute facility*    Current Facility: [address] [place] [address], Neal Dwoell ([facility])  Anticipated Discharge Date: D    Facility Nursing Update:  Resident  is alert and oriented x2. She complains of being wobbly at times and is participating with rehab therapy as tolerated. On O2 2LPM at night per RP request.   Facility Social Work Update: Resident was admitted for short term skilled care and plans to stay LTC here. SS will assist as needed. Bundle Program Status: none  Upper Extremity Assistance: Stand by Assist - Presence and Cueing  Lower Extremity Assistance: Stand by Assist - Presence and Cueing  Bed Mobility: Contact Guard Assist - hands on patient for balance   Transfers: Contact Guard Assist - hands on patient for balance   Ambulation: Contact Guard Assist - hands on patient for balance   How far (in feet) is the patient ambulating?  140 ft  Device Used: walker  Barriers to Discharge: TBFAUSTINA Abel LPN Care Coordinator no concerns

## 2021-12-27 ENCOUNTER — OUTPATIENT (OUTPATIENT)
Dept: OUTPATIENT SERVICES | Facility: HOSPITAL | Age: 56
LOS: 1 days | Discharge: HOME | End: 2021-12-27

## 2021-12-27 ENCOUNTER — APPOINTMENT (OUTPATIENT)
Dept: PSYCHIATRY | Facility: CLINIC | Age: 56
End: 2021-12-27
Payer: MEDICARE

## 2021-12-27 DIAGNOSIS — Z98.890 OTHER SPECIFIED POSTPROCEDURAL STATES: Chronic | ICD-10-CM

## 2021-12-27 DIAGNOSIS — F25.9 SCHIZOAFFECTIVE DISORDER, UNSPECIFIED: ICD-10-CM

## 2021-12-27 PROCEDURE — 99214 OFFICE O/P EST MOD 30 MIN: CPT | Mod: 95

## 2022-01-13 ENCOUNTER — APPOINTMENT (OUTPATIENT)
Dept: INTERNAL MEDICINE | Facility: CLINIC | Age: 57
End: 2022-01-13

## 2022-02-14 ENCOUNTER — APPOINTMENT (OUTPATIENT)
Dept: PSYCHIATRY | Facility: CLINIC | Age: 57
End: 2022-02-14
Payer: MEDICARE

## 2022-02-14 ENCOUNTER — OUTPATIENT (OUTPATIENT)
Dept: OUTPATIENT SERVICES | Facility: HOSPITAL | Age: 57
LOS: 1 days | Discharge: HOME | End: 2022-02-14

## 2022-02-14 DIAGNOSIS — F25.9 SCHIZOAFFECTIVE DISORDER, UNSPECIFIED: ICD-10-CM

## 2022-02-14 DIAGNOSIS — Z98.890 OTHER SPECIFIED POSTPROCEDURAL STATES: Chronic | ICD-10-CM

## 2022-02-14 PROCEDURE — 99214 OFFICE O/P EST MOD 30 MIN: CPT | Mod: 95

## 2022-04-11 ENCOUNTER — APPOINTMENT (OUTPATIENT)
Dept: PSYCHIATRY | Facility: CLINIC | Age: 57
End: 2022-04-11
Payer: MEDICARE

## 2022-04-11 ENCOUNTER — OUTPATIENT (OUTPATIENT)
Dept: OUTPATIENT SERVICES | Facility: HOSPITAL | Age: 57
LOS: 1 days | Discharge: HOME | End: 2022-04-11

## 2022-04-11 DIAGNOSIS — F25.0 SCHIZOAFFECTIVE DISORDER, BIPOLAR TYPE: ICD-10-CM

## 2022-04-11 DIAGNOSIS — F41.1 GENERALIZED ANXIETY DISORDER: ICD-10-CM

## 2022-04-11 DIAGNOSIS — Z98.890 OTHER SPECIFIED POSTPROCEDURAL STATES: Chronic | ICD-10-CM

## 2022-04-11 PROCEDURE — 99214 OFFICE O/P EST MOD 30 MIN: CPT | Mod: 95

## 2022-04-19 ENCOUNTER — APPOINTMENT (OUTPATIENT)
Dept: INTERNAL MEDICINE | Facility: CLINIC | Age: 57
End: 2022-04-19
Payer: MEDICARE

## 2022-04-19 ENCOUNTER — OUTPATIENT (OUTPATIENT)
Dept: OUTPATIENT SERVICES | Facility: HOSPITAL | Age: 57
LOS: 1 days | Discharge: HOME | End: 2022-04-19

## 2022-04-19 VITALS
WEIGHT: 270 LBS | DIASTOLIC BLOOD PRESSURE: 83 MMHG | OXYGEN SATURATION: 99 % | BODY MASS INDEX: 43.39 KG/M2 | HEART RATE: 98 BPM | HEIGHT: 66 IN | TEMPERATURE: 97.1 F | SYSTOLIC BLOOD PRESSURE: 140 MMHG

## 2022-04-19 DIAGNOSIS — Z98.890 OTHER SPECIFIED POSTPROCEDURAL STATES: Chronic | ICD-10-CM

## 2022-04-19 PROCEDURE — 99214 OFFICE O/P EST MOD 30 MIN: CPT | Mod: GC

## 2022-04-19 NOTE — HISTORY OF PRESENT ILLNESS
[de-identified] : s/p breakthrough seizure 2 weeks ago\par claims good compliance with meds\par showed me two half full bottles of keppra and depakote

## 2022-04-19 NOTE — ASSESSMENT
[FreeTextEntry1] : # Hx of papillary thyroid Ca, s/p partial thyroidectomy:\par  - repeat TSH, didn’t do last time\par \par # Seizure disorder: \par on keppra 750mg BID + divalproex 500 BID \par - claims good compliance but still had breakthrough\par \par # Bipolar disorder, stable:\par - Olanzapine 15 mg daily, Psych f/u (Dr. Bearden) \par \par - Mammography 2019 BIRADS 1, given new rx for mammogram \par - Colonoscopy 2019 with 10 mm adenoma, repeat in 2022\par - GYN: s/p TAHBSO, referred to hassan if cervix present\par - Bone Density aug 2020: Repeat in 2 years \par - RTC in 6 months, repeat blood work\par

## 2022-04-19 NOTE — PHYSICAL EXAM
[No Acute Distress] : no acute distress [No Respiratory Distress] : no respiratory distress  [Normal Rate] : normal rate  [Soft] : abdomen soft [No HSM] : no HSM [No CVA Tenderness] : no CVA  tenderness [No Joint Swelling] : no joint swelling [No Rash] : no rash

## 2022-04-20 DIAGNOSIS — E66.01 MORBID (SEVERE) OBESITY DUE TO EXCESS CALORIES: ICD-10-CM

## 2022-04-22 ENCOUNTER — RESULT REVIEW (OUTPATIENT)
Age: 57
End: 2022-04-22

## 2022-04-22 ENCOUNTER — OUTPATIENT (OUTPATIENT)
Dept: OUTPATIENT SERVICES | Facility: HOSPITAL | Age: 57
LOS: 1 days | Discharge: HOME | End: 2022-04-22
Payer: MEDICARE

## 2022-04-22 ENCOUNTER — NON-APPOINTMENT (OUTPATIENT)
Age: 57
End: 2022-04-22

## 2022-04-22 DIAGNOSIS — Z98.890 OTHER SPECIFIED POSTPROCEDURAL STATES: Chronic | ICD-10-CM

## 2022-04-22 DIAGNOSIS — Z12.31 ENCOUNTER FOR SCREENING MAMMOGRAM FOR MALIGNANT NEOPLASM OF BREAST: ICD-10-CM

## 2022-04-22 PROCEDURE — 77063 BREAST TOMOSYNTHESIS BI: CPT | Mod: 26

## 2022-04-22 PROCEDURE — 77067 SCR MAMMO BI INCL CAD: CPT | Mod: 26

## 2022-04-26 ENCOUNTER — NON-APPOINTMENT (OUTPATIENT)
Age: 57
End: 2022-04-26

## 2022-04-27 ENCOUNTER — NON-APPOINTMENT (OUTPATIENT)
Age: 57
End: 2022-04-27

## 2022-04-28 ENCOUNTER — RESULT REVIEW (OUTPATIENT)
Age: 57
End: 2022-04-28

## 2022-04-28 ENCOUNTER — NON-APPOINTMENT (OUTPATIENT)
Age: 57
End: 2022-04-28

## 2022-04-28 ENCOUNTER — OUTPATIENT (OUTPATIENT)
Dept: OUTPATIENT SERVICES | Facility: HOSPITAL | Age: 57
LOS: 1 days | Discharge: HOME | End: 2022-04-28
Payer: MEDICARE

## 2022-04-28 DIAGNOSIS — R92.8 OTHER ABNORMAL AND INCONCLUSIVE FINDINGS ON DIAGNOSTIC IMAGING OF BREAST: ICD-10-CM

## 2022-04-28 DIAGNOSIS — Z98.890 OTHER SPECIFIED POSTPROCEDURAL STATES: Chronic | ICD-10-CM

## 2022-04-28 PROCEDURE — 76642 ULTRASOUND BREAST LIMITED: CPT | Mod: 26,RT

## 2022-04-28 PROCEDURE — 77065 DX MAMMO INCL CAD UNI: CPT | Mod: 26,RT

## 2022-04-28 PROCEDURE — G0279: CPT | Mod: 26

## 2022-05-11 ENCOUNTER — RESULT REVIEW (OUTPATIENT)
Age: 57
End: 2022-05-11

## 2022-05-11 ENCOUNTER — OUTPATIENT (OUTPATIENT)
Dept: OUTPATIENT SERVICES | Facility: HOSPITAL | Age: 57
LOS: 1 days | Discharge: HOME | End: 2022-05-11
Payer: MEDICARE

## 2022-05-11 DIAGNOSIS — Z98.890 OTHER SPECIFIED POSTPROCEDURAL STATES: Chronic | ICD-10-CM

## 2022-05-11 PROCEDURE — 88341 IMHCHEM/IMCYTCHM EA ADD ANTB: CPT | Mod: 26

## 2022-05-11 PROCEDURE — 88305 TISSUE EXAM BY PATHOLOGIST: CPT | Mod: 26

## 2022-05-11 PROCEDURE — 19083 BX BREAST 1ST LESION US IMAG: CPT | Mod: RT

## 2022-05-11 PROCEDURE — 77065 DX MAMMO INCL CAD UNI: CPT | Mod: 26,RT,59

## 2022-05-11 PROCEDURE — 88342 IMHCHEM/IMCYTCHM 1ST ANTB: CPT | Mod: 26

## 2022-05-12 LAB — SURGICAL PATHOLOGY STUDY: SIGNIFICANT CHANGE UP

## 2022-05-16 DIAGNOSIS — N64.1 FAT NECROSIS OF BREAST: ICD-10-CM

## 2022-05-16 DIAGNOSIS — N63.10 UNSPECIFIED LUMP IN THE RIGHT BREAST, UNSPECIFIED QUADRANT: ICD-10-CM

## 2022-05-25 LAB
ALBUMIN SERPL ELPH-MCNC: 4.1 G/DL
ALP BLD-CCNC: 108 U/L
ALT SERPL-CCNC: 12 U/L
ANION GAP SERPL CALC-SCNC: 14 MMOL/L
AST SERPL-CCNC: 15 U/L
BASOPHILS # BLD AUTO: 0.04 K/UL
BASOPHILS NFR BLD AUTO: 0.5 %
BILIRUB SERPL-MCNC: 0.3 MG/DL
BUN SERPL-MCNC: 17 MG/DL
CALCIUM SERPL-MCNC: 9.3 MG/DL
CHLORIDE SERPL-SCNC: 107 MMOL/L
CHOLEST SERPL-MCNC: 222 MG/DL
CO2 SERPL-SCNC: 21 MMOL/L
CREAT SERPL-MCNC: 0.8 MG/DL
EGFR: 86 ML/MIN/1.73M2
EOSINOPHIL # BLD AUTO: 0.14 K/UL
EOSINOPHIL NFR BLD AUTO: 1.8 %
ESTIMATED AVERAGE GLUCOSE: 123 MG/DL
GLUCOSE SERPL-MCNC: 82 MG/DL
HBA1C MFR BLD HPLC: 5.9 %
HCT VFR BLD CALC: 39.9 %
HCV AB SER QL: NONREACTIVE
HCV S/CO RATIO: 0.09 S/CO
HDLC SERPL-MCNC: 62 MG/DL
HGB BLD-MCNC: 12.8 G/DL
HIV1+2 AB SPEC QL IA.RAPID: NONREACTIVE
IMM GRANULOCYTES NFR BLD AUTO: 0.4 %
LDLC SERPL CALC-MCNC: 123 MG/DL
LYMPHOCYTES # BLD AUTO: 3.41 K/UL
LYMPHOCYTES NFR BLD AUTO: 43.6 %
MAN DIFF?: NORMAL
MCHC RBC-ENTMCNC: 30 PG
MCHC RBC-ENTMCNC: 32.1 G/DL
MCV RBC AUTO: 93.7 FL
MONOCYTES # BLD AUTO: 0.77 K/UL
MONOCYTES NFR BLD AUTO: 9.8 %
NEUTROPHILS # BLD AUTO: 3.44 K/UL
NEUTROPHILS NFR BLD AUTO: 43.9 %
NONHDLC SERPL-MCNC: 160 MG/DL
PLATELET # BLD AUTO: 322 K/UL
POTASSIUM SERPL-SCNC: 4.5 MMOL/L
PROT SERPL-MCNC: 7.5 G/DL
RBC # BLD: 4.26 M/UL
RBC # FLD: 15.6 %
SODIUM SERPL-SCNC: 142 MMOL/L
TRIGL SERPL-MCNC: 184 MG/DL
TSH SERPL-ACNC: 1.31 UIU/ML
WBC # FLD AUTO: 7.83 K/UL

## 2022-06-06 ENCOUNTER — LABORATORY RESULT (OUTPATIENT)
Age: 57
End: 2022-06-06

## 2022-06-06 ENCOUNTER — APPOINTMENT (OUTPATIENT)
Dept: PSYCHIATRY | Facility: CLINIC | Age: 57
End: 2022-06-06

## 2022-06-06 ENCOUNTER — OUTPATIENT (OUTPATIENT)
Dept: OUTPATIENT SERVICES | Facility: HOSPITAL | Age: 57
LOS: 1 days | Discharge: HOME | End: 2022-06-06

## 2022-06-06 DIAGNOSIS — Z98.890 OTHER SPECIFIED POSTPROCEDURAL STATES: Chronic | ICD-10-CM

## 2022-06-06 PROCEDURE — 99214 OFFICE O/P EST MOD 30 MIN: CPT | Mod: 95

## 2022-06-07 ENCOUNTER — APPOINTMENT (OUTPATIENT)
Dept: OBGYN | Facility: CLINIC | Age: 57
End: 2022-06-07

## 2022-06-07 ENCOUNTER — OUTPATIENT (OUTPATIENT)
Dept: OUTPATIENT SERVICES | Facility: HOSPITAL | Age: 57
LOS: 1 days | Discharge: HOME | End: 2022-06-07

## 2022-06-07 VITALS
BODY MASS INDEX: 40.66 KG/M2 | WEIGHT: 253 LBS | SYSTOLIC BLOOD PRESSURE: 130 MMHG | HEIGHT: 66 IN | DIASTOLIC BLOOD PRESSURE: 68 MMHG

## 2022-06-07 DIAGNOSIS — Z98.890 OTHER SPECIFIED POSTPROCEDURAL STATES: Chronic | ICD-10-CM

## 2022-06-07 DIAGNOSIS — Z01.419 ENCOUNTER FOR GYNECOLOGICAL EXAMINATION (GENERAL) (ROUTINE) W/OUT ABNORMAL FINDINGS: ICD-10-CM

## 2022-06-07 PROCEDURE — 99396 PREV VISIT EST AGE 40-64: CPT

## 2022-06-16 ENCOUNTER — EMERGENCY (EMERGENCY)
Facility: HOSPITAL | Age: 57
LOS: 0 days | Discharge: HOME | End: 2022-06-16
Attending: EMERGENCY MEDICINE | Admitting: EMERGENCY MEDICINE
Payer: MEDICARE

## 2022-06-16 VITALS
TEMPERATURE: 97 F | HEART RATE: 99 BPM | SYSTOLIC BLOOD PRESSURE: 147 MMHG | HEIGHT: 66 IN | RESPIRATION RATE: 18 BRPM | WEIGHT: 253.09 LBS | DIASTOLIC BLOOD PRESSURE: 96 MMHG | OXYGEN SATURATION: 97 %

## 2022-06-16 DIAGNOSIS — Z98.890 OTHER SPECIFIED POSTPROCEDURAL STATES: Chronic | ICD-10-CM

## 2022-06-16 DIAGNOSIS — S52.502A UNSPECIFIED FRACTURE OF THE LOWER END OF LEFT RADIUS, INITIAL ENCOUNTER FOR CLOSED FRACTURE: ICD-10-CM

## 2022-06-16 DIAGNOSIS — Z86.718 PERSONAL HISTORY OF OTHER VENOUS THROMBOSIS AND EMBOLISM: ICD-10-CM

## 2022-06-16 DIAGNOSIS — Z88.0 ALLERGY STATUS TO PENICILLIN: ICD-10-CM

## 2022-06-16 DIAGNOSIS — M79.89 OTHER SPECIFIED SOFT TISSUE DISORDERS: ICD-10-CM
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
## 2022-06-16 DIAGNOSIS — G40.909 EPILEPSY, UNSPECIFIED, NOT INTRACTABLE, WITHOUT STATUS EPILEPTICUS: ICD-10-CM
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
## 2022-06-16 DIAGNOSIS — F31.9 BIPOLAR DISORDER, UNSPECIFIED: ICD-10-CM

## 2022-06-16 DIAGNOSIS — Z91.018 ALLERGY TO OTHER FOODS: ICD-10-CM

## 2022-06-16 PROCEDURE — 99284 EMERGENCY DEPT VISIT MOD MDM: CPT | Mod: 25

## 2022-06-16 PROCEDURE — 73110 X-RAY EXAM OF WRIST: CPT | Mod: 26,LT

## 2022-06-16 PROCEDURE — 73130 X-RAY EXAM OF HAND: CPT | Mod: 26,LT

## 2022-06-16 PROCEDURE — 73080 X-RAY EXAM OF ELBOW: CPT | Mod: 26,LT

## 2022-06-16 PROCEDURE — 73090 X-RAY EXAM OF FOREARM: CPT | Mod: 26,LT

## 2022-06-16 PROCEDURE — 29125 APPL SHORT ARM SPLINT STATIC: CPT

## 2022-06-16 RX ORDER — IBUPROFEN 200 MG
600 TABLET ORAL ONCE
Refills: 0 | Status: COMPLETED | OUTPATIENT
Start: 2022-06-16 | End: 2022-06-16

## 2022-06-16 RX ADMIN — Medication 600 MILLIGRAM(S): at 17:51

## 2022-06-16 NOTE — ED PROVIDER NOTE - OBJECTIVE STATEMENT
57-year-old female with PMH of LUE forearm surgery, seizures, bipolar, depression who presents with left arm swelling x2 days.  Patient woke up with swelling yesterday morning, persistent swelling and pain.  Patient cannot remember any trauma or falls.  Patient denies history of DVT.  Patient denies fevers, chills, rash, numbness, tingling

## 2022-06-16 NOTE — ED ADULT NURSE NOTE - PRO INTERPRETER NEED 2
COVID-19  April 9, 2020.  Symptoms resolved.  Antibody positive  GERD (gastroesophageal reflux disease)    Miscarriage
English

## 2022-06-16 NOTE — ED PROVIDER NOTE - PATIENT PORTAL LINK FT
You can access the FollowMyHealth Patient Portal offered by [facility] by registering at the following website: [email or web address]. By joining [employer]’s FollowMyHealth portal, you will also be able to view your health information using other applications (apps) compatible with our system.

## 2022-06-16 NOTE — ED PROVIDER NOTE - CLINICAL SUMMARY MEDICAL DECISION MAKING FREE TEXT BOX
57-year-old female past medical history of DVT, depression, bipolar disease, seizure disorder, history of left forearm surgery 3 years ago presented for evaluation of non- traumatic pain and swelling of her left forearm since yesterday morning.  Patient states she woke up like that yesterday morning.  Pain is nonradiating, worse with palpation and movement of the wrist, denies any associated symptoms.  Well-appearing female sitting in a chair in no acute distress exam shows tenderness to palpation/swelling/ecchymosis over the ventral aspect of the left wrist and forearm,  unable to move the wrist due to pain, painless range of motion at the elbow and shoulder joints, fingers appear well perfused  and neurovascularly intact, no edema of the arm.  Plan: pain meds, x-ray, reassess.

## 2022-06-16 NOTE — ED PROVIDER NOTE - PROGRESS NOTE DETAILS
AH - Xray showing distal radius fx in proximity to previous hardware, ortho consulted, will see pt EP: The case was discussed with orthopedics, no acute intervention at present, will place sugar-tong splint and DC home.  Patient was advised to follow-up with Dr. Ramos in the next 1-3 days.  Patient to be discharged from ED. Any available test results were discussed with patient and/or family. Verbal instructions given, including instructions to return to ED immediately for any new, worsening, or concerning symptoms. Patient endorsed understanding. Written discharge instructions additionally given, including follow-up plan.  Patient was given opportunity to ask questions.

## 2022-06-16 NOTE — ED PROVIDER NOTE - NS ED ROS FT
Review of Systems:  CONSTITUTIONAL: No fever, No diaphoresis, No generalized weakness  SKIN: No rash  HEMATOLOGIC: No abnormal bleeding or bruising  RESPIRATORY: No shortness of breath, No cough  CARDIAC: No chest pain, No palpitations  GI: No abdominal pain, No nausea, No vomiting, No diarrhea  MUSCULOSKELETAL: + joint paint, + swelling, No back pain  NEUROLOGIC: No numbness, No focal weakness, No headache, No dizziness  All other systems negative, unless specified in HPI

## 2022-06-16 NOTE — ED PROVIDER NOTE - PHYSICAL EXAMINATION
CONSTITUTIONAL: in no acute distress, afebrile  SKIN: Warm, dry  HEAD: Normocephalic; atraumatic  NECK: Supple; non tender  EXT: No clubbing or cyanosis.  + TTP of L distal forearm, with swelling and bruising, no TTP of elbow, humerus, shoulder, hand; neurovascularly in tact  NEURO: A&O x3, grossly unremarkable, no focal deficits  *Chaperone was used during the encounter

## 2022-06-16 NOTE — ED ADULT NURSE NOTE - NSIMPLEMENTINTERV_GEN_ALL_ED
MD
Implemented All Universal Safety Interventions:  [place] to call system. Call bell, personal items and telephone within reach. Instruct patient to call for assistance. Room bathroom lighting operational. Non-slip footwear when patient is off stretcher. Physically safe environment: no spills, clutter or unnecessary equipment. Stretcher in lowest position, wheels locked, appropriate side rails in place.

## 2022-06-16 NOTE — ED PROVIDER NOTE - CARE PROVIDER_API CALL
Karma Ramos (MD)  [facility]  [address] [place] [address]  Phone: [phone]  Fax: [phone]  Follow Up Time: 1-3 Days

## 2022-06-16 NOTE — ED PROVIDER NOTE - WR ORDER DATE AND TIME 4
"Anesthesia Transfer of Care Note    Patient: Nedra Garcia    Procedure(s) Performed: Procedure(s) (LRB):  COLONOSCOPY (N/A)    Patient location: GI    Anesthesia Type: MAC    Transport from OR: Transported from OR on room air with adequate spontaneous ventilation    Post pain: adequate analgesia    Post assessment: no apparent anesthetic complications and tolerated procedure well    Post vital signs: stable    Level of consciousness: responds to stimulation and sedated    Nausea/Vomiting: no nausea/vomiting    Complications: none    Transfer of care protocol was followed      Last vitals:   Visit Vitals  /69 (BP Location: Left arm, Patient Position: Lying)   Pulse 72   Temp 37.1 °C (98.8 °F) (Skin)   Resp 18   Ht 4' 11" (1.499 m)   Wt 57.2 kg (126 lb)   SpO2 (!) 93%   Breastfeeding No   BMI 25.45 kg/m²     " 16-Jun-2022 18:01

## 2022-06-16 NOTE — ED PROVIDER NOTE - NSFOLLOWUPINSTRUCTIONS_ED_ALL_ED_FT
- Please follow up with Dr Ramos      Radial Fracture       A radial fracture is a break in the radius bone. The radius is a bone in the forearm, on the same side as the thumb. The forearm is the part of the arm that is between the elbow and the wrist. A radial fracture near the wrist (distal radialfracture) is the most common type of broken arm. A fracture can also occur near the elbow (radial head fracture).      What are the causes?  The most common cause of a radial fracture is falling with the arm outstretched. Other causes include:  •An accident, such as a car or bike accident.      •A hard, direct hit to the forearm.        What increases the risk?  You may be at greater risk for a radial fracture if you:  •Are female.      •Are an older adult.      •Play contact sports.      •Have a condition that causes your bones to become thin and brittle (osteoporosis).        What are the signs or symptoms?  A radial fracture causes pain immediately after the injury. Other signs and symptoms may include:  •An abnormal bend or bump in the arm (deformity).      •Swelling.      •Bruising.      •Numbness or tingling in your arm and hand.      •Limited movement of your arm and hand.        How is this diagnosed?  This condition may be diagnosed based on:  •Your symptoms and medical history.      •A physical exam.      •An X-ray.        How is this treated?  Treatment depends on how severe your fracture is, where it is, and how the pieces of the broken bone line up with each other (alignment).  •The first step may be for you to wear a temporary splint for a few days, until your swelling goes down. After the swelling goes down, you may get a cast, get a different type of splint, or have surgery.      •If your broken bone is in good alignment, you will need to wear a splint or cast for up to 6 weeks.    •If your broken bone is not aligned (is displaced), your health care provider will need to align the bone pieces. After alignment, you will need to wear a splint or cast for up to 6 weeks. To align your broken bone, your health care provider may:  •Move the bones back into position without surgery (closed reduction).      •Perform surgery to align the fracture and fix the bone pieces into place with metal screws, plates, or wires (open reduction and internal fixation, ORIF).      •Perform surgery to align the fracture and fix the bone pieces into place with pins that are attached to a stabilizing bar outside your skin (external fixation).      Treatment may also include:  •Having your cast changed after 2–3 weeks.      •Physical therapy.      •Follow-up visits and X-rays to make sure you are healing.        Follow these instructions at home:    If you have a splint:     •Wear it as told by your health care provider. Remove it only as told by your health care provider.       •Loosen the splint if your fingers tingle, become numb, or turn cold and blue.       •Keep the splint clean and dry.      If you have a cast:     • Do not stick anything inside the cast to scratch your skin. Doing that increases your risk for infection.       •Check the skin around the cast every day. Tell your health care provider about any concerns.       •You may put lotion on dry skin around the edges of the cast. Do not put lotion on the skin underneath the cast.      •Keep the cast clean and dry.      Bathing     • Do not take baths, swim, or use a hot tub until your health care provider approves. Ask your health care provider if you may take showers. You may only be allowed to take sponge baths.    •If your splint or cast is not waterproof:  •Do not let it get wet.      •Cover it with a watertight covering when you take a bath or a shower.        Activity     •  Do not lift anything with your injured arm.      • Do not use the injured arm to support your body weight until your health care provider says that you can.      •Ask your health care provider what activities are safe for you during recovery, and ask what activities you need to avoid.        Managing pain, stiffness, and swelling    •If directed, put ice on painful areas:   •If you have a removable splint, remove it as told by your health care provider.       •Put ice in a plastic bag.       •Place a towel between your skin and the bag, or between your cast and the bag.      •Leave the ice on for 20 minutes, 2–3 times a day.        • Move your fingers often to avoid stiffness and to lessen swelling.       •Raise (elevate) your arm above the level of your heart while you are sitting or lying down.      General instructions     • Do not put pressure on any part of the cast or splint until it is fully hardened, if applicable. This may take several hours.       •Take over-the-counter and prescription medicines only as told by your health care provider.      •  Do not drive until your health care provider approves. You should not drive or use heavy machinery while taking prescription pain medicine.       • Do not use any products that contain nicotine or tobacco, such as cigarettes and e-cigarettes. These can delay bone healing. If you need help quitting, ask your health care provider.      •Keep all follow-up visits as told by your health care provider. This is important.        Contact a health care provider if you have:    •Pain that does not get better with medicine.      •Swelling that gets worse.      •A bad smell coming from your cast.        Get help right away if:    •You cannot move your fingers.      •You have severe pain.    •Your fingers or your hand:  •Become numb, cold, or pale.      •Turn a bluish color.          Summary    •A radial fracture is a break in the radius bone. The radius is in the forearm, on the same side as the thumb.      •Treatment depends on how severe your fracture is, where it is, and how the pieces of the broken bone line up with each other.      •A splint or cast may be needed to help the fracture heal. A more severe break may require surgery.      This information is not intended to replace advice given to you by your health care provider. Make sure you discuss any questions you have with your health care provider.      Document Revised: 12/12/2018 Document Reviewed: 12/12/2018    [employer] Patient Education © 2021 [employer]

## 2022-06-22 NOTE — ED ADULT NURSE NOTE - SUICIDE SCREENING DEPRESSION
From: Luh Bishop  To: Brenda Ortiz  Sent: 6/22/2022 2:40 PM CDT  Subject: Pen Needle Prescription request    Hello,    May I please request a prescription for pen needles? (Ultra fine 8 mm.) This could go through [facility] by mail pharmacy.     Thank you!  Luh   Negative

## 2022-06-23 ENCOUNTER — NON-APPOINTMENT (OUTPATIENT)
Age: 57
End: 2022-06-23

## 2022-06-28 ENCOUNTER — APPOINTMENT (OUTPATIENT)
Dept: ORTHOPEDIC SURGERY | Facility: CLINIC | Age: 57
End: 2022-06-28
Payer: MEDICARE

## 2022-06-28 VITALS — WEIGHT: 253 LBS | BODY MASS INDEX: 40.66 KG/M2 | HEIGHT: 66 IN

## 2022-06-28 DIAGNOSIS — Z78.9 OTHER SPECIFIED HEALTH STATUS: ICD-10-CM

## 2022-06-28 PROCEDURE — 99214 OFFICE O/P EST MOD 30 MIN: CPT

## 2022-06-28 PROCEDURE — 73110 X-RAY EXAM OF WRIST: CPT | Mod: LT

## 2022-06-28 NOTE — DATA REVIEWED
[FreeTextEntry1] :  a shortened dorsally angulated intra-articular distal radius fracture, hardware on both radial and ulnar shaft status post surgery

## 2022-06-28 NOTE — ASSESSMENT
[FreeTextEntry1] :  patient has a periprosthetic distal radius fracture.  It is operative.  She would like to move forward with surgery.  We discussed the pathology at length. The patient understands that the distal radius is malaligned/shortened/articular surface is disrupted. They would benefit from ORIF to restore length alignment and rotation.  We discussed the post op protocol regarding immobilization, therapy, time off from work etc.  Risks, benefits, and alternatives of surgery discussed with patient (and family). Risks including but not limited to infection, blood loss, tendon damage and delayed tendon disruption, muscle damage, nerve damage, stiffness, pain, arthritis, malunion, nonunion, hardware failure, loss of function, potential for secondary surgery, loss of limb or life. The patient had the opportunity to ask questions and all questions were answered to their satisfaction.  They agree to surgery. I discussed with her to periprosthetic fracture.  I discussed with her we will need to remove hardware most likely before putting the other hardware in place.  I also discussed with the patient she needs to have her   Seizures under control.  She will need clearance.\par  Bcc Histology Text: There were numerous aggregates of basaloid cells.

## 2022-06-28 NOTE — IMAGING
[de-identified] :   In short-arm cast which is fitting her relatively well, good range of motion of fingers, neurovascularly intact

## 2022-06-28 NOTE — HISTORY OF PRESENT ILLNESS
[de-identified] : Patient comes in status post fall onto her left upper extremity.  She had a seizure and is not sure what occurred.  Since then she broke her wrist.  She is in a cast.

## 2022-06-28 NOTE — REASON FOR VISIT
[FreeTextEntry2] : Patient comes in today for a follow up for a displaced fracture of distal end of left radius

## 2022-06-30 ENCOUNTER — OUTPATIENT (OUTPATIENT)
Dept: OUTPATIENT SERVICES | Facility: HOSPITAL | Age: 57
LOS: 1 days | Discharge: HOME | End: 2022-06-30

## 2022-06-30 VITALS
HEART RATE: 90 BPM | SYSTOLIC BLOOD PRESSURE: 140 MMHG | TEMPERATURE: 98 F | OXYGEN SATURATION: 100 % | HEIGHT: 66 IN | RESPIRATION RATE: 18 BRPM | WEIGHT: 257.5 LBS | DIASTOLIC BLOOD PRESSURE: 70 MMHG

## 2022-06-30 DIAGNOSIS — S62.109A FRACTURE OF UNSPECIFIED CARPAL BONE, UNSPECIFIED WRIST, INITIAL ENCOUNTER FOR CLOSED FRACTURE: Chronic | ICD-10-CM

## 2022-06-30 DIAGNOSIS — T84.84XD PAIN DUE TO INTERNAL ORTHOPEDIC PROSTHETIC DEVICES, IMPLANTS AND GRAFTS, SUBSEQUENT ENCOUNTER: ICD-10-CM

## 2022-06-30 DIAGNOSIS — Z01.818 ENCOUNTER FOR OTHER PREPROCEDURAL EXAMINATION: ICD-10-CM

## 2022-06-30 DIAGNOSIS — Z98.890 OTHER SPECIFIED POSTPROCEDURAL STATES: Chronic | ICD-10-CM

## 2022-06-30 LAB
ALBUMIN SERPL ELPH-MCNC: 4.4 G/DL — SIGNIFICANT CHANGE UP (ref 3.5–5.2)
ALP SERPL-CCNC: 95 U/L — SIGNIFICANT CHANGE UP (ref 30–115)
ALT FLD-CCNC: 18 U/L — SIGNIFICANT CHANGE UP (ref 0–41)
ANION GAP SERPL CALC-SCNC: 13 MMOL/L — SIGNIFICANT CHANGE UP (ref 7–14)
APTT BLD: 36.4 SEC — SIGNIFICANT CHANGE UP (ref 27–39.2)
AST SERPL-CCNC: 17 U/L — SIGNIFICANT CHANGE UP (ref 0–41)
BASOPHILS # BLD AUTO: 0.02 K/UL — SIGNIFICANT CHANGE UP (ref 0–0.2)
BASOPHILS NFR BLD AUTO: 0.2 % — SIGNIFICANT CHANGE UP (ref 0–1)
BILIRUB SERPL-MCNC: 0.6 MG/DL — SIGNIFICANT CHANGE UP (ref 0.2–1.2)
BUN SERPL-MCNC: 20 MG/DL — SIGNIFICANT CHANGE UP (ref 10–20)
CALCIUM SERPL-MCNC: 9.7 MG/DL — SIGNIFICANT CHANGE UP (ref 8.5–10.1)
CHLORIDE SERPL-SCNC: 106 MMOL/L — SIGNIFICANT CHANGE UP (ref 98–110)
CO2 SERPL-SCNC: 26 MMOL/L — SIGNIFICANT CHANGE UP (ref 17–32)
CREAT SERPL-MCNC: 0.9 MG/DL — SIGNIFICANT CHANGE UP (ref 0.7–1.5)
EGFR: 75 ML/MIN/1.73M2 — SIGNIFICANT CHANGE UP
EOSINOPHIL # BLD AUTO: 0.18 K/UL — SIGNIFICANT CHANGE UP (ref 0–0.7)
EOSINOPHIL NFR BLD AUTO: 2.2 % — SIGNIFICANT CHANGE UP (ref 0–8)
GLUCOSE SERPL-MCNC: 84 MG/DL — SIGNIFICANT CHANGE UP (ref 70–99)
HCT VFR BLD CALC: 40.4 % — SIGNIFICANT CHANGE UP (ref 37–47)
HGB BLD-MCNC: 12.8 G/DL — SIGNIFICANT CHANGE UP (ref 12–16)
IMM GRANULOCYTES NFR BLD AUTO: 0.4 % — HIGH (ref 0.1–0.3)
INR BLD: 1.04 RATIO — SIGNIFICANT CHANGE UP (ref 0.65–1.3)
LYMPHOCYTES # BLD AUTO: 2.03 K/UL — SIGNIFICANT CHANGE UP (ref 1.2–3.4)
LYMPHOCYTES # BLD AUTO: 25 % — SIGNIFICANT CHANGE UP (ref 20.5–51.1)
MCHC RBC-ENTMCNC: 29.8 PG — SIGNIFICANT CHANGE UP (ref 27–31)
MCHC RBC-ENTMCNC: 31.7 G/DL — LOW (ref 32–37)
MCV RBC AUTO: 94 FL — SIGNIFICANT CHANGE UP (ref 81–99)
MONOCYTES # BLD AUTO: 0.85 K/UL — HIGH (ref 0.1–0.6)
MONOCYTES NFR BLD AUTO: 10.5 % — HIGH (ref 1.7–9.3)
NEUTROPHILS # BLD AUTO: 5.02 K/UL — SIGNIFICANT CHANGE UP (ref 1.4–6.5)
NEUTROPHILS NFR BLD AUTO: 61.7 % — SIGNIFICANT CHANGE UP (ref 42.2–75.2)
NRBC # BLD: 0 /100 WBCS — SIGNIFICANT CHANGE UP (ref 0–0)
PLATELET # BLD AUTO: 272 K/UL — SIGNIFICANT CHANGE UP (ref 130–400)
POTASSIUM SERPL-MCNC: 4.4 MMOL/L — SIGNIFICANT CHANGE UP (ref 3.5–5)
POTASSIUM SERPL-SCNC: 4.4 MMOL/L — SIGNIFICANT CHANGE UP (ref 3.5–5)
PROT SERPL-MCNC: 7.4 G/DL — SIGNIFICANT CHANGE UP (ref 6–8)
PROTHROM AB SERPL-ACNC: 11.9 SEC — SIGNIFICANT CHANGE UP (ref 9.95–12.87)
RBC # BLD: 4.3 M/UL — SIGNIFICANT CHANGE UP (ref 4.2–5.4)
RBC # FLD: 14.6 % — HIGH (ref 11.5–14.5)
SODIUM SERPL-SCNC: 145 MMOL/L — SIGNIFICANT CHANGE UP (ref 135–146)
WBC # BLD: 8.13 K/UL — SIGNIFICANT CHANGE UP (ref 4.8–10.8)
WBC # FLD AUTO: 8.13 K/UL — SIGNIFICANT CHANGE UP (ref 4.8–10.8)

## 2022-06-30 PROCEDURE — 93010 ELECTROCARDIOGRAM REPORT: CPT

## 2022-06-30 NOTE — H&P PST ADULT - NSICDXPASTMEDICALHX_GEN_ALL_CORE_FT
PAST MEDICAL HISTORY:  Bipolar 1 disorder     Depression DENIES SUICIDAL IDEATION    Seizures LAST SZ 6/15/22

## 2022-07-01 ENCOUNTER — EMERGENCY (EMERGENCY)
Facility: HOSPITAL | Age: 57
LOS: 0 days | Discharge: HOME | End: 2022-07-01
Attending: EMERGENCY MEDICINE | Admitting: EMERGENCY MEDICINE

## 2022-07-01 VITALS
DIASTOLIC BLOOD PRESSURE: 89 MMHG | HEART RATE: 120 BPM | SYSTOLIC BLOOD PRESSURE: 138 MMHG | WEIGHT: 257.06 LBS | OXYGEN SATURATION: 98 % | TEMPERATURE: 99 F | RESPIRATION RATE: 15 BRPM | HEIGHT: 66 IN

## 2022-07-01 VITALS — HEART RATE: 102 BPM

## 2022-07-01 DIAGNOSIS — H92.02 OTALGIA, LEFT EAR: ICD-10-CM

## 2022-07-01 DIAGNOSIS — Z91.018 ALLERGY TO OTHER FOODS: ICD-10-CM

## 2022-07-01 DIAGNOSIS — Z86.69 PERSONAL HISTORY OF OTHER DISEASES OF THE NERVOUS SYSTEM AND SENSE ORGANS: ICD-10-CM

## 2022-07-01 DIAGNOSIS — F31.9 BIPOLAR DISORDER, UNSPECIFIED: ICD-10-CM

## 2022-07-01 DIAGNOSIS — S62.109A FRACTURE OF UNSPECIFIED CARPAL BONE, UNSPECIFIED WRIST, INITIAL ENCOUNTER FOR CLOSED FRACTURE: Chronic | ICD-10-CM

## 2022-07-01 DIAGNOSIS — Z88.0 ALLERGY STATUS TO PENICILLIN: ICD-10-CM

## 2022-07-01 DIAGNOSIS — Z98.890 OTHER SPECIFIED POSTPROCEDURAL STATES: Chronic | ICD-10-CM

## 2022-07-01 DIAGNOSIS — H60.92 UNSPECIFIED OTITIS EXTERNA, LEFT EAR: ICD-10-CM

## 2022-07-01 PROBLEM — R56.9 UNSPECIFIED CONVULSIONS: Chronic | Status: ACTIVE | Noted: 2018-05-03

## 2022-07-01 PROCEDURE — 99283 EMERGENCY DEPT VISIT LOW MDM: CPT

## 2022-07-01 RX ORDER — OFLOXACIN 200 MG
5 TABLET ORAL
Qty: 50 | Refills: 0
Start: 2022-07-01 | End: 2022-07-10

## 2022-07-01 RX ORDER — CEFUROXIME AXETIL 250 MG
1 TABLET ORAL
Qty: 20 | Refills: 0
Start: 2022-07-01 | End: 2022-07-10

## 2022-07-01 NOTE — ED PROVIDER NOTE - DISPOSITION TYPE
pt states he ran out of his psychotropic medications today (prolexin, depakote, and benzotropion). he states he missed his appointment with his doctor and will not be able to get an appointment until june 1st. DISCHARGE

## 2022-07-01 NOTE — ED PROVIDER NOTE - CLINICAL SUMMARY MEDICAL DECISION MAKING FREE TEXT BOX
58 yo F presented to ED for L otitis externa. Will give pt oral and optical antibiotics because unable to visualize TM. Strict return precautions given to pt- fevers, chills, worsening pain, worsening drainage.   pt understands. dc with ENT fu

## 2022-07-01 NOTE — ED PROVIDER NOTE - NS ED ROS FT
Review of Systems   Constitutional:  No Weight Change, No Fever, No Chills, No weakness    ENT/Mouth:  No Nasal Congestion, No Hoarseness, No sore throat, No Rhinorrhea, No Swallowing Difficulty, L ear pain and drainage   Eyes:  No Eye Pain, No Swelling, No Redness, No Discharge, No Vision Changes  Cardiovascular:  No Chest Pain,   Respiratory:  No SOB, No Cough,  Gastrointestinal:  No Nausea, No Vomiting,

## 2022-07-01 NOTE — ED PROVIDER NOTE - PHYSICAL EXAMINATION
Const: NAD  Eyes: PERRL, no conjunctival injection  HENT:  Neck supple without meningismus, no pain with movement of pinna b/l. no mastoid tenderness b/l. Pt has swelling of tragus L ear. Swelling of ear canal of L ear- unable to visualize TM. No drainage of L ear noted.  midline uvula. no stridor or drooling  CV: RRR, Warm, well-perfused extremities  RESP: CTA B/L, no tachypnea   Psych: Appropriate mood and affect.

## 2022-07-01 NOTE — ED PROVIDER NOTE - NSFOLLOWUPCLINICS_GEN_ALL_ED_FT
[facility]  ENT  [address], [address]nd [place] [address]  Phone: [phone]  Fax:   Follow Up Time: 4-6 Days

## 2022-07-01 NOTE — ED PROVIDER NOTE - NSFOLLOWUPINSTRUCTIONS_ED_ALL_ED_FT
Our Emergency Department Referral Coordinators will be reaching out to you in the next 24-48 hours from 9:00am to 5:00pm with a follow up appointment. Please expect a phone call from the hospital in that time frame. If you do not receive a call or if you have any questions or concerns, you can reach them at [phone] or [phone].                                                                                                                                                           Otitis Externa    Ear anatomy showing the outer ear canal and the eardrum. The outer ear canal is red due to swimmer's ear.   Otitis externa is an infection of the outer ear canal. The outer ear canal is the area between the outside of the ear and the eardrum. Otitis externa is sometimes called swimmer's ear.      What are the causes?    Common causes of this condition include:  •Swimming in dirty water.      •Moisture in the ear.      •An injury to the inside of the ear.      •An object stuck in the ear.      •A cut or scrape on the outside of the ear or in the ear canal.        What increases the risk?    You are more likely to develop this condition if you go swimming often.      What are the signs or symptoms?    The first symptom of this condition is often itching in the ear. Later symptoms of the condition include:  •Swelling of the ear.      •Redness in the ear.      •Ear pain. The pain may get worse when you pull on your ear.      •Pus coming from the ear.        How is this diagnosed?    This condition may be diagnosed by examining the ear and testing fluid from the ear for bacteria and funguses.      How is this treated?    This condition may be treated with:  •Antibiotic ear drops. These are often given for 10–14 days.      •Medicines to reduce itching and swelling.        Follow these instructions at home:    •If you were prescribed antibiotic ear drops, use them as told by your health care provider. Do not stop using the antibiotic even if you start to feel better.      •Take over-the-counter and prescription medicines only as told by your health care provider.      •Avoid getting water in your ears as told by your health care provider. This may include avoiding swimming or water sports for a few days.      •Keep all follow-up visits. This is important.        How is this prevented?    •Keep your ears dry. Use the corner of a towel to dry your ears after you swim or bathe.      •Avoid scratching or putting things in your ear. Doing these things can damage the ear canal or remove the protective wax that lines it, which makes it easier for bacteria and funguses to grow.      •Avoid swimming in lakes, polluted water, or swimming pools that may not have enough chlorine.        Contact a health care provider if:    •You have a fever.      •Your ear is still red, swollen, painful, or draining pus after 3 days.      •Your redness, swelling, or pain gets worse.      •You have a severe headache.        Get help right away if:    •You have redness, swelling, and pain or tenderness in the area behind your ear.        Summary    •Otitis externa is an infection of the outer ear canal.      •Common causes include swimming in dirty water, moisture in the ear, or a cut or scrape in the ear.      •Symptoms include pain, redness, and swelling of the ear canal.      •If you were prescribed antibiotic ear drops, use them as told by your health care provider. Do not stop using the antibiotic even if you start to feel better.      This information is not intended to replace advice given to you by your health care provider. Make sure you discuss any questions you have with your health care provider.      Document Revised: 03/02/2022 Document Reviewed: 03/02/2022    Elsevier Patient Education © 2022 [employer] Inc.

## 2022-07-01 NOTE — ED PROVIDER NOTE - PATIENT PORTAL LINK FT
You can access the FollowMyHealth Patient Portal offered by [facility] by registering at the following website: [email or web address]. By joining [employer]’s FollowMyHealth portal, you will also be able to view your health information using other applications (apps) compatible with our system.

## 2022-07-01 NOTE — ED PROVIDER NOTE - NSPTACCESSSVCSAPPT_ED_ALL_ED
Reinaldo Real attended 2 hours of group therapy today.    Total group size of 5.    4/24/2019 3:09 PM Yaniv Peterson   Specialty Care (Immediate)...

## 2022-07-01 NOTE — ED PROVIDER NOTE - OBJECTIVE STATEMENT
58 yo F with PMH of seizures, bipolar presents to ED for 2 days of L ear pain. Associated with discharge and swelling to her ear. No fevers, chills, difficulty swallowing, change in voice, nausea or vomiting.

## 2022-07-04 ENCOUNTER — LABORATORY RESULT (OUTPATIENT)
Age: 57
End: 2022-07-04

## 2022-07-05 ENCOUNTER — OUTPATIENT (OUTPATIENT)
Dept: OUTPATIENT SERVICES | Facility: HOSPITAL | Age: 57
LOS: 1 days | Discharge: HOME | End: 2022-07-05

## 2022-07-05 ENCOUNTER — APPOINTMENT (OUTPATIENT)
Dept: INTERNAL MEDICINE | Facility: CLINIC | Age: 57
End: 2022-07-05

## 2022-07-05 ENCOUNTER — NON-APPOINTMENT (OUTPATIENT)
Age: 57
End: 2022-07-05

## 2022-07-05 VITALS
SYSTOLIC BLOOD PRESSURE: 122 MMHG | HEIGHT: 66 IN | TEMPERATURE: 97 F | OXYGEN SATURATION: 95 % | HEART RATE: 93 BPM | DIASTOLIC BLOOD PRESSURE: 87 MMHG | WEIGHT: 253 LBS | BODY MASS INDEX: 40.66 KG/M2

## 2022-07-05 DIAGNOSIS — S62.109A FRACTURE OF UNSPECIFIED CARPAL BONE, UNSPECIFIED WRIST, INITIAL ENCOUNTER FOR CLOSED FRACTURE: Chronic | ICD-10-CM

## 2022-07-05 DIAGNOSIS — T14.8XXA OTHER INJURY OF UNSPECIFIED BODY REGION, INITIAL ENCOUNTER: ICD-10-CM

## 2022-07-05 DIAGNOSIS — Z98.890 OTHER SPECIFIED POSTPROCEDURAL STATES: Chronic | ICD-10-CM

## 2022-07-05 DIAGNOSIS — Z00.00 ENCOUNTER FOR GENERAL ADULT MEDICAL EXAMINATION W/OUT ABNORMAL FINDINGS: ICD-10-CM

## 2022-07-05 PROCEDURE — 99213 OFFICE O/P EST LOW 20 MIN: CPT | Mod: GC

## 2022-07-06 DIAGNOSIS — Z00.00 ENCOUNTER FOR GENERAL ADULT MEDICAL EXAMINATION WITHOUT ABNORMAL FINDINGS: ICD-10-CM

## 2022-07-06 NOTE — ASU PATIENT PROFILE, ADULT - FALL HARM RISK - UNIVERSAL INTERVENTIONS
Bed in lowest position, wheels locked, appropriate side rails in place/Call bell, personal items and telephone in reach/Instruct patient to call for assistance before getting out of bed or chair/Non-slip footwear when patient is out of bed/[place] to call system/Physically safe environment - no spills, clutter or unnecessary equipment/Purposeful Proactive Rounding/Room/bathroom lighting operational, light cord in reach

## 2022-07-07 ENCOUNTER — APPOINTMENT (OUTPATIENT)
Age: 57
End: 2022-07-07

## 2022-07-07 ENCOUNTER — OUTPATIENT (OUTPATIENT)
Dept: OUTPATIENT SERVICES | Facility: HOSPITAL | Age: 57
LOS: 1 days | Discharge: HOME | End: 2022-07-07

## 2022-07-07 ENCOUNTER — TRANSCRIPTION ENCOUNTER (OUTPATIENT)
Age: 57
End: 2022-07-07

## 2022-07-07 VITALS
SYSTOLIC BLOOD PRESSURE: 137 MMHG | TEMPERATURE: 99 F | DIASTOLIC BLOOD PRESSURE: 81 MMHG | HEIGHT: 66 IN | WEIGHT: 257.5 LBS | OXYGEN SATURATION: 97 % | HEART RATE: 74 BPM | RESPIRATION RATE: 20 BRPM

## 2022-07-07 VITALS
OXYGEN SATURATION: 96 % | DIASTOLIC BLOOD PRESSURE: 62 MMHG | SYSTOLIC BLOOD PRESSURE: 130 MMHG | HEART RATE: 85 BPM | RESPIRATION RATE: 15 BRPM

## 2022-07-07 DIAGNOSIS — S62.109A FRACTURE OF UNSPECIFIED CARPAL BONE, UNSPECIFIED WRIST, INITIAL ENCOUNTER FOR CLOSED FRACTURE: Chronic | ICD-10-CM

## 2022-07-07 DIAGNOSIS — Z98.890 OTHER SPECIFIED POSTPROCEDURAL STATES: Chronic | ICD-10-CM

## 2022-07-07 PROCEDURE — 25609 OPTX DST RD XART FX/EP SEP3+: CPT | Mod: LT

## 2022-07-07 PROCEDURE — 20680 REMOVAL OF IMPLANT DEEP: CPT | Mod: LT

## 2022-07-07 RX ORDER — HYDROMORPHONE HYDROCHLORIDE 2 MG/ML
0.5 INJECTION INTRAMUSCULAR; INTRAVENOUS; SUBCUTANEOUS
Refills: 0 | Status: DISCONTINUED | OUTPATIENT
Start: 2022-07-07 | End: 2022-07-07

## 2022-07-07 RX ORDER — OLANZAPINE 15 MG/1
1 TABLET, FILM COATED ORAL
Qty: 0 | Refills: 0 | DISCHARGE

## 2022-07-07 RX ORDER — IBUPROFEN 200 MG
1 TABLET ORAL
Qty: 90 | Refills: 0
Start: 2022-07-07 | End: 2022-08-05

## 2022-07-07 RX ORDER — OXYCODONE HYDROCHLORIDE 5 MG/1
5 TABLET ORAL ONCE
Refills: 0 | Status: DISCONTINUED | OUTPATIENT
Start: 2022-07-07 | End: 2022-07-07

## 2022-07-07 RX ORDER — ONDANSETRON 8 MG/1
4 TABLET, FILM COATED ORAL ONCE
Refills: 0 | Status: DISCONTINUED | OUTPATIENT
Start: 2022-07-07 | End: 2022-07-21

## 2022-07-07 RX ORDER — DIVALPROEX SODIUM 500 MG/1
500 TABLET, DELAYED RELEASE ORAL
Qty: 0 | Refills: 0 | DISCHARGE

## 2022-07-07 RX ORDER — ACETAMINOPHEN 500 MG
1000 TABLET ORAL ONCE
Refills: 0 | Status: COMPLETED | OUTPATIENT
Start: 2022-07-07 | End: 2022-07-07

## 2022-07-07 RX ORDER — LEVETIRACETAM 250 MG/1
1 TABLET, FILM COATED ORAL
Qty: 0 | Refills: 0 | DISCHARGE

## 2022-07-07 RX ORDER — SODIUM CHLORIDE 9 MG/ML
1000 INJECTION, SOLUTION INTRAVENOUS
Refills: 0 | Status: DISCONTINUED | OUTPATIENT
Start: 2022-07-07 | End: 2022-07-21

## 2022-07-07 RX ORDER — DIVALPROEX SODIUM 500 MG/1
0 TABLET, DELAYED RELEASE ORAL
Qty: 0 | Refills: 0 | DISCHARGE

## 2022-07-07 RX ORDER — HYDROMORPHONE HYDROCHLORIDE 2 MG/ML
1 INJECTION INTRAMUSCULAR; INTRAVENOUS; SUBCUTANEOUS
Refills: 0 | Status: DISCONTINUED | OUTPATIENT
Start: 2022-07-07 | End: 2022-07-07

## 2022-07-07 RX ADMIN — OXYCODONE HYDROCHLORIDE 5 MILLIGRAM(S): 5 TABLET ORAL at 15:13

## 2022-07-07 RX ADMIN — Medication 1000 MILLIGRAM(S): at 15:12

## 2022-07-07 RX ADMIN — SODIUM CHLORIDE 100 MILLILITER(S): 9 INJECTION, SOLUTION INTRAVENOUS at 13:09

## 2022-07-07 RX ADMIN — HYDROMORPHONE HYDROCHLORIDE 1 MILLIGRAM(S): 2 INJECTION INTRAMUSCULAR; INTRAVENOUS; SUBCUTANEOUS at 13:21

## 2022-07-07 RX ADMIN — HYDROMORPHONE HYDROCHLORIDE 1 MILLIGRAM(S): 2 INJECTION INTRAMUSCULAR; INTRAVENOUS; SUBCUTANEOUS at 13:40

## 2022-07-07 RX ADMIN — HYDROMORPHONE HYDROCHLORIDE 1 MILLIGRAM(S): 2 INJECTION INTRAMUSCULAR; INTRAVENOUS; SUBCUTANEOUS at 13:09

## 2022-07-07 RX ADMIN — SODIUM CHLORIDE 100 MILLILITER(S): 9 INJECTION, SOLUTION INTRAVENOUS at 13:25

## 2022-07-07 NOTE — BRIEF OPERATIVE NOTE - NSICDXBRIEFPOSTOP_GEN_ALL_CORE_FT
POST-OP DIAGNOSIS:  Fracture of distal end of left radius 07-Jul-2022 12:58:37 periprosthetic   Karma Ramos

## 2022-07-07 NOTE — PRE-ANESTHESIA EVALUATION ADULT - NSANTHDIETYNSD_GEN_ALL_CORE
GRADE study visit     Patient is here for 27 month [facility] study visit. Patient is doing well and continues on Metformin 1000 mg bid in addition to randomized treatment of glimepiride.  He is currently taking 1 mg daily.   He had one hypoglycemic episode where he felt very shaky after taking down curtains.  He ate a lot and felt better.  Did not test his blood sugar.  Just moved in with his son in [place] and he likes this a lot.  Still watching his grandchildren once a week.  He otherwise has been feeling well and in his usual state of health.      Lab results:   A1c: 6.6%     Plan:   1. Diabetes- A1c at target and his no longer having hypoglycemia.  No changes.  May need to cut back amaryl if he becomes more physically active this spring.   2.  HTN- blood pressure is high today despite 5 antihypertensive agents.  Unsure if he has had a workup for secondary causes.  He will f/u with Dr. Mckee or his cardiologist about BP management.  He does have moderate albuminuria.   3. Follow up in 3 months, sooner with concerns.      Lulu Cortés PA-C, MPAS   [facility]  Department of Medicine  Division of Endocrinology and Diabetes    
GRADE study visit    Patient is here for 24 month [facility] study visit. Patient is doing well and continues on Metformin 1000 mg bid in addition to randomized treatment of glimepiride.  He is currently taking 1 mg daily.   He had once hypoglycemic episode where he felt very shaky after taking down curtains.  He ate a lot and felt better.  Did not test his blood sugar.  Just moved in with his son in [place] and he likes this a lot.  Still watching his grandchildren once a week.  He otherwise has been feeling well and in his usual state of health.     Lab results:     Plan:   1. Diabetes- A1c at target and his no longer having hypoglycemia.  No changes.  May need to cut back amaryl if he becomes more physically active this spring.   2.  HTN- blood pressure is high today despite 5 antihypertensive agents.  Unsure if he has had a workup for secondary causes.  He will f/u with Dr. Mckee or his cardiologist about BP management.  He does have moderate albuminuria.   3. Follow up in 3 months, sooner with concerns.     Lulu Cortés PA-C, MPAS   [facility]  Department of Medicine  Division of Endocrinology and Diabetes    
Yes

## 2022-07-07 NOTE — ASU DISCHARGE PLAN (ADULT/PEDIATRIC) - NS MD DC FALL RISK RISK
For information on Fall & Injury Prevention, visit: [email or web address] OR  [email or web address] OR  https://www.cdc.gov/steadi/patient.html

## 2022-07-07 NOTE — CHART NOTE - NSCHARTNOTEFT_GEN_A_CORE
PACU ANESTHESIA ADMISSION NOTE      Procedure: ORIF, 3-part fracture, radius, distal, intra-articular    Removal of deep screw      Post op diagnosis:  Fracture of distal end of left radius        ____  Intubated  TV:______       Rate: ______      FiO2: ______    _x___  Patent Airway    _x___  Full return of protective reflexes    _x___  Full recovery from anesthesia / back to baseline status    Vitals  SPO2:-99%  HR:-82  RR:-12  B.P:-141/78  TEMP:-97.8    Mental Status:  _x___ Awake   ___x_ Alert   _____ Drowsy   _____ Sedated    Nausea/Vomiting:  _x___  NO       ______Yes,   See Post - Op Orders         Pain Scale (0-10):  __0___    Treatment: _x___ None    __x__ See Post - Op/PCA Orders    Post - Operative Fluids:   ___ Oral   ____x See Post - Op Orders    Plan: Discharge:   __x__Home       ____Floor     _____Critical Care    _____  Other:_________________    Comments:  Report endorsed to RN in pacu  Vitals stable  No anesthesia issues or complications noted.  Discharge to patient to  home when criteria met.

## 2022-07-07 NOTE — BRIEF OPERATIVE NOTE - NSICDXBRIEFPREOP_GEN_ALL_CORE_FT
PRE-OP DIAGNOSIS:  Fracture of distal end of left radius 07-Jul-2022 12:58:22 periprosthetic   Karma Ramos

## 2022-07-07 NOTE — PRE-ANESTHESIA EVALUATION ADULT - HEIGHT IN INCHES
I tried calling pt to see how she is taking her Metform 500 mg. We got a fax from her pharmacy needing to know if she is taking one tab daily or 2 tabs daily. And my charted her a couple of  Days ago   Day Bo MA    6

## 2022-07-07 NOTE — BRIEF OPERATIVE NOTE - NSICDXBRIEFPROCEDURE_GEN_ALL_CORE_FT
PROCEDURES:  ORIF, 3-part fracture, radius, distal, intra-articular 07-Jul-2022 12:57:04  Karma Ramos  Removal of deep screw 07-Jul-2022 12:57:39  Karma Ramos

## 2022-07-11 DIAGNOSIS — S52.572A OTHER INTRAARTICULAR FRACTURE OF LOWER END OF LEFT RADIUS, INITIAL ENCOUNTER FOR CLOSED FRACTURE: ICD-10-CM
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
## 2022-07-11 DIAGNOSIS — F32.9 MAJOR DEPRESSIVE DISORDER, SINGLE EPISODE, UNSPECIFIED: ICD-10-CM

## 2022-07-20 ENCOUNTER — APPOINTMENT (OUTPATIENT)
Dept: ORTHOPEDIC SURGERY | Facility: CLINIC | Age: 57
End: 2022-07-20

## 2022-07-20 PROCEDURE — 99024 POSTOP FOLLOW-UP VISIT: CPT

## 2022-07-20 PROCEDURE — 73110 X-RAY EXAM OF WRIST: CPT | Mod: LT

## 2022-07-20 RX ORDER — CEFUROXIME AXETIL 500 MG/1
500 TABLET ORAL
Qty: 20 | Refills: 0 | Status: COMPLETED | COMMUNITY
Start: 2022-07-01

## 2022-07-20 RX ORDER — OXYCODONE AND ACETAMINOPHEN 5; 325 MG/1; MG/1
5-325 TABLET ORAL
Qty: 20 | Refills: 0 | Status: COMPLETED | COMMUNITY
Start: 2022-07-07

## 2022-07-20 RX ORDER — OFLOXACIN OTIC 3 MG/ML
0.3 SOLUTION AURICULAR (OTIC)
Qty: 10 | Refills: 0 | Status: COMPLETED | COMMUNITY
Start: 2022-07-01

## 2022-07-20 NOTE — PHYSICAL EXAM
[Left] : left hand [] : palpable radial pulse [FreeTextEntry3] :  Mild edema noted of the left wrist.  The incision is healing well.  Clean, dry, intact.  No surrounding erythema or drainage noted

## 2022-07-20 NOTE — DISCUSSION/SUMMARY
[de-identified] :   I reviewed the x-ray findings with the patient.  Today the sutures were removed.  She will go next door for a custom splint.  She will start occupational therapy.  The patient states that her insurance company will not cover the occupational therapy.  In my opinion, if that is the case, she should go once or twice to learn the exercises and convert to a home program she will follow up in 3 weeks for further evaluation with Dr. Ramos\nick \nick  supervising physician:

## 2022-07-20 NOTE — DATA REVIEWED
[Left] : left [Wrist] : wrist [FreeTextEntry1] :  X-rays show surgical hardware in appropriate position.  The fracture is in acceptable anatomic alignment.

## 2022-07-20 NOTE — HISTORY OF PRESENT ILLNESS
[de-identified] : The patient is a 57-year-old female here for subsequent re-evaluation of her left wrist.  She is status post ORIF for left wrist for a left distal radius periprosthetic fracture on 07/07/2022.  This is her initial postoperative visit.

## 2022-07-22 PROBLEM — T14.8XXA FRACTURE: Status: ACTIVE | Noted: 2019-07-01

## 2022-07-22 NOTE — PHYSICAL EXAM
[No Acute Distress] : no acute distress [PERRL] : pupils equal round and reactive to light [EOMI] : extraocular movements intact [Normal Outer Ear/Nose] : the outer ears and nose were normal in appearance [Normal Oropharynx] : the oropharynx was normal [No JVD] : no jugular venous distention [Supple] : supple [No Respiratory Distress] : no respiratory distress  [Clear to Auscultation] : lungs were clear to auscultation bilaterally [Normal Rate] : normal rate  [Regular Rhythm] : with a regular rhythm [Soft] : abdomen soft [Non Tender] : non-tender [No CVA Tenderness] : no CVA  tenderness [No Spinal Tenderness] : no spinal tenderness [No Rash] : no rash [No Focal Deficits] : no focal deficits [Normal Gait] : normal gait [Alert and Oriented x3] : oriented to person, place, and time [Normal Insight/Judgement] : insight and judgment were intact [de-identified] : Obese +LUE cast

## 2022-07-22 NOTE — ASSESSMENT
[FreeTextEntry1] : 56 yo F who presents for medical clearance for outpatient ORIF of L wrist (w Dr. Ramos) after recent outpatient mechanical? fall.\par \par \par # Wrist fracture\par - s/p fall, examine injury, stable pain controlled\par - patient stable for OR\par - able  to do METS >4; recent lab work unremarkable, vitals stable. \par Advised patient no nsaids for a few days prior to surgery.\par Post surgery, resume outpatient scheduled followup appointments w Dr. Cottrell. \par \par \par # Seizure\par - on treatment has occasional breakthrough seizures\par -  followed by neurology

## 2022-07-22 NOTE — HISTORY OF PRESENT ILLNESS
[FreeTextEntry1] : - seizure disorder\par - wrist fracture\par - medical clearance for surgery [de-identified] : 56 yo F w pmh of seizure disorder and mood disorder who presents for medical clearance for ORIF of L wrist. \par Patient with no complaints at this time, compliant with medications; ROS negative. Is able to do greater than 4 mets. No cardiopulmonary complaints. Feels well.  \par \par

## 2022-07-28 ENCOUNTER — APPOINTMENT (OUTPATIENT)
Dept: OTOLARYNGOLOGY | Facility: CLINIC | Age: 57
End: 2022-07-28

## 2022-08-01 ENCOUNTER — APPOINTMENT (OUTPATIENT)
Dept: PSYCHIATRY | Facility: CLINIC | Age: 57
End: 2022-08-01

## 2022-08-10 ENCOUNTER — APPOINTMENT (OUTPATIENT)
Dept: ORTHOPEDIC SURGERY | Facility: CLINIC | Age: 57
End: 2022-08-10

## 2022-08-17 ENCOUNTER — APPOINTMENT (OUTPATIENT)
Dept: PSYCHIATRY | Facility: CLINIC | Age: 57
End: 2022-08-17

## 2022-08-17 ENCOUNTER — OUTPATIENT (OUTPATIENT)
Dept: OUTPATIENT SERVICES | Facility: HOSPITAL | Age: 57
LOS: 1 days | Discharge: HOME | End: 2022-08-17

## 2022-08-17 DIAGNOSIS — Z98.890 OTHER SPECIFIED POSTPROCEDURAL STATES: Chronic | ICD-10-CM

## 2022-08-17 DIAGNOSIS — S62.109A FRACTURE OF UNSPECIFIED CARPAL BONE, UNSPECIFIED WRIST, INITIAL ENCOUNTER FOR CLOSED FRACTURE: Chronic | ICD-10-CM

## 2022-08-17 DIAGNOSIS — F41.1 GENERALIZED ANXIETY DISORDER: ICD-10-CM

## 2022-08-17 DIAGNOSIS — F25.0 SCHIZOAFFECTIVE DISORDER, BIPOLAR TYPE: ICD-10-CM

## 2022-08-17 PROCEDURE — 99214 OFFICE O/P EST MOD 30 MIN: CPT

## 2022-08-17 RX ORDER — OLANZAPINE 10 MG/1
10 TABLET, FILM COATED ORAL TWICE DAILY
Qty: 180 | Refills: 0 | Status: ACTIVE | COMMUNITY
Start: 2021-03-02 | End: 1900-01-01

## 2022-08-17 RX ORDER — LEVETIRACETAM 750 MG/1
750 TABLET, FILM COATED ORAL TWICE DAILY
Qty: 60 | Refills: 5 | Status: ACTIVE | COMMUNITY
Start: 2018-12-04 | End: 1900-01-01

## 2022-09-08 NOTE — ED ADULT NURSE NOTE - PRO INTERPRETER NEED 2
[facility] Birth Control Pills Counseling: Birth Control Pill Counseling: I discussed with the patient the potential side effects of OCPs including but not limited to increased risk of stroke, heart attack, thrombophlebitis, deep venous thrombosis, hepatic adenomas, breast changes, GI upset, headaches, and depression.  The patient verbalized understanding of the proper use and possible adverse effects of OCPs. All of the patient's questions and concerns were addressed.

## 2022-09-13 ENCOUNTER — APPOINTMENT (OUTPATIENT)
Dept: NEUROLOGY | Facility: CLINIC | Age: 57
End: 2022-09-13

## 2022-09-13 ENCOUNTER — OUTPATIENT (OUTPATIENT)
Dept: OUTPATIENT SERVICES | Facility: HOSPITAL | Age: 57
LOS: 1 days | Discharge: HOME | End: 2022-09-13

## 2022-09-13 VITALS
HEART RATE: 81 BPM | DIASTOLIC BLOOD PRESSURE: 79 MMHG | BODY MASS INDEX: 42.59 KG/M2 | SYSTOLIC BLOOD PRESSURE: 113 MMHG | TEMPERATURE: 97 F | HEIGHT: 66 IN | OXYGEN SATURATION: 98 % | WEIGHT: 265 LBS

## 2022-09-13 DIAGNOSIS — S62.109A FRACTURE OF UNSPECIFIED CARPAL BONE, UNSPECIFIED WRIST, INITIAL ENCOUNTER FOR CLOSED FRACTURE: Chronic | ICD-10-CM

## 2022-09-13 DIAGNOSIS — Z98.890 OTHER SPECIFIED POSTPROCEDURAL STATES: Chronic | ICD-10-CM

## 2022-09-13 PROCEDURE — 99214 OFFICE O/P EST MOD 30 MIN: CPT | Mod: GC

## 2022-09-13 NOTE — ASSESSMENT
[FreeTextEntry1] : Reports compliance with depakote keppra and olanzapine however low valproic acid level supports non-adherence rather than breakthrough seizure. Attempted to reach  for collateral on med adherence. Discussed with [facility] in house social work about possibility of wellness check or confirmation of med adherence.\par \par #Seizure\par - C/w keppra 750 BID and Depakote 500 BID, refilled by \par - F/u keppra and depakote level\par - RTC in 3 months

## 2022-09-13 NOTE — PHYSICAL EXAM
[FreeTextEntry1] : \par \par MS: AAOx3. Speech fluent\par CN: EOMI. PERRL. V1-V3 intact\par MOTOR: 5/5 UE 5/5 le\par SENSORY: Symmetric to lLT.\par REFLEXES: 2+ Biceps 1+ patellar\par COORD: FTN intact. No dysmetria.\par GAIT: Narrow\par

## 2022-09-13 NOTE — HISTORY OF PRESENT ILLNESS
[FreeTextEntry1] : 55yo right handed handed female PMH seizure d/o and schizophrenia on depakote  BID, keppra 750 BID, olanzapine 10mg BID.\par \par Follow with behavioral health. Last saw neurology 2 years. Her seizures in the past involved falling out of bed with full body shaking\par \par Last seizure was in July: woke up on the floor. One seizure 6 months prior to that. \par \par No bowel/bladder incontinence. No other episodes of shaking or LOC. No irritability. Sleeps through the night. Denied numbness or tingling, balance issues, headache, vision changes (lights, spots, colors). \par Spring 2022 labs:, Valproate <3,  A1C 5.9. TSH wnl. Reports compliance with depakote keppra and olanzapine. No etoh or recreational drugs.  Sweating a lot due to menopause.\par \par \par \par

## 2022-09-13 NOTE — END OF VISIT
[] : Resident [FreeTextEntry3] : I spoke with patient and reviewed chart and labs.  Patient reports compliance and that she manages her own medications.  Last anticonvulsant levels low raising concern for compliance.  Will recheck levels.  Continue same anticonvulsant doses for now.  Discussed with  who will f/u.

## 2022-09-16 ENCOUNTER — NON-APPOINTMENT (OUTPATIENT)
Age: 57
End: 2022-09-16

## 2022-09-19 ENCOUNTER — OUTPATIENT (OUTPATIENT)
Dept: OUTPATIENT SERVICES | Facility: HOSPITAL | Age: 57
LOS: 1 days | Discharge: HOME | End: 2022-09-19

## 2022-09-19 DIAGNOSIS — S52.572D OTHER INTRAARTICULAR FRACTURE OF LOWER END OF LEFT RADIUS, SUBSEQUENT ENCOUNTER FOR CLOSED FRACTURE WITH ROUTINE HEALING: ICD-10-CM

## 2022-09-19 DIAGNOSIS — Z98.890 OTHER SPECIFIED POSTPROCEDURAL STATES: Chronic | ICD-10-CM

## 2022-09-19 DIAGNOSIS — S62.109A FRACTURE OF UNSPECIFIED CARPAL BONE, UNSPECIFIED WRIST, INITIAL ENCOUNTER FOR CLOSED FRACTURE: Chronic | ICD-10-CM

## 2022-09-22 ENCOUNTER — NON-APPOINTMENT (OUTPATIENT)
Age: 57
End: 2022-09-22

## 2022-09-24 NOTE — ED PROVIDER NOTE - OBJECTIVE STATEMENT
MD and Xray at bedside.    54 yo female here after EMS was called by family she was painting for/staying with. Patient states she went to shower and then EMS showed up and brought her to ED. Patient has no complaints. No CP, SOB, abdominal pains, or back pains. Patient wishes to go home.

## 2022-10-07 ENCOUNTER — APPOINTMENT (OUTPATIENT)
Dept: ORTHOPEDIC SURGERY | Facility: CLINIC | Age: 57
End: 2022-10-07

## 2022-10-07 DIAGNOSIS — S52.572D OTHER INTRAARTICULAR FRACTURE OF LOWER END OF LEFT RADIUS, SUBSEQUENT ENCOUNTER FOR CLOSED FRACTURE WITH ROUTINE HEALING: ICD-10-CM

## 2022-10-07 PROCEDURE — 99213 OFFICE O/P EST LOW 20 MIN: CPT

## 2022-10-07 PROCEDURE — 73110 X-RAY EXAM OF WRIST: CPT | Mod: LT

## 2022-10-07 NOTE — IMAGING
[de-identified] :   Incision site well healed, full range of motion of wrist, full range of motion of fingers, neurovascularly intact

## 2022-10-07 NOTE — ASSESSMENT
[FreeTextEntry1] :  patient is status post ORIF of the radius.  She is doing well.  She does not complaints.  She has good range of motion.  She will follow up back as needed.

## 2022-10-07 NOTE — HISTORY OF PRESENT ILLNESS
[de-identified] : Patient comes in status post revision ORIF the distal radius and radial shaft.  She is doing relatively well.  She has been doing therapy.  She does not have complaints today.

## 2022-10-08 LAB
LEVETIRACETAM SERPL-MCNC: <2 UG/ML
VALPROATE SERPL-MCNC: <3 UG/ML

## 2022-10-12 ENCOUNTER — APPOINTMENT (OUTPATIENT)
Dept: PSYCHIATRY | Facility: CLINIC | Age: 57
End: 2022-10-12

## 2022-10-12 ENCOUNTER — OUTPATIENT (OUTPATIENT)
Dept: OUTPATIENT SERVICES | Facility: HOSPITAL | Age: 57
LOS: 1 days | Discharge: HOME | End: 2022-10-12

## 2022-10-12 DIAGNOSIS — F41.1 GENERALIZED ANXIETY DISORDER: ICD-10-CM

## 2022-10-12 DIAGNOSIS — F25.0 SCHIZOAFFECTIVE DISORDER, BIPOLAR TYPE: ICD-10-CM

## 2022-10-12 DIAGNOSIS — S62.109A FRACTURE OF UNSPECIFIED CARPAL BONE, UNSPECIFIED WRIST, INITIAL ENCOUNTER FOR CLOSED FRACTURE: Chronic | ICD-10-CM

## 2022-10-12 DIAGNOSIS — Z98.890 OTHER SPECIFIED POSTPROCEDURAL STATES: Chronic | ICD-10-CM

## 2022-10-12 PROCEDURE — 99214 OFFICE O/P EST MOD 30 MIN: CPT

## 2022-10-12 NOTE — HISTORY OF PRESENT ILLNESS
[FreeTextEntry1] : This is a 57  year old patient who presents for medication management.  The patient reports stable mood, good sleep and appetite.  The patient denies any symptoms of depression, israel, or psychosis at this time.  The patient relays mild chronic anxiety that occasionally impairs their daily functioning. The patient denies any suicidal ideation, homicidal ideation, no auditory or visual hallucinations, or paranoid ideation.  The patient has no medical complaints. The patient denies any drug or alcohol use, and is not smoking at this time. I requested the patient's updated physical and labs from their PCP.  Coping skills were discussed and a safety plan was provided.  The patient was educated on the risks, benefits, and alternatives of their psychiatric medications.  The patient will not engage in psychotherapy at this time.  The patient consents to ongoing medication management.\par \par Lab 6/22, encouraged to take depakote, level was low.  Patient physical updated.Lab 9/22 vpa low again, maintains she takes medications as prescribed\par \par PHQ9=0.\par \par [phone]  [place]\par  [TextBox_32] : Pt is at low acute risk for self harm as pt has no h/o suicidality, no current suicidality, is future oriented, has supportive family, is engaged with treatment, has housing and financial stability, and has good access to care.

## 2022-10-12 NOTE — CURRENT PSYCHIATRIC SYMPTOMS
[Depressed Mood] : no depressed mood [Anhedonia] : no anhedonia [Guilt] : not feeling guilty [Decreased Concentration] : no decrease in concentrating ability [Hyperphagia] : no hyperphagia [Insomnia] : no insomnia disorder [Hypersomnia] : no ~T hypersomnia [Psychomotor Retardation] : no psychomotor retardation [Anorexia] : no anorexia [Euphoria] : no euphoria [Highly Irritable] : no high irritability [Increased Activity] : no increased in activity [Distractibility] : not distracted [Talkativeness] : no talkativeness [Grandeur] : no feelings of grandeur [Buying Sprees] : no buying sprees [Hypersexuality] : denied hypersexuality [Dec Need For Sleep] : no decreased need for sleep [Delusions] : no ~T delusions [Hallucination Visual] : no visual hallucinations [Hallucination Auditory] : no auditory hallucinations [Hallucination Tactile] : no tactile hallucinations [Thought Disorder] : ~T a thought disorder was not noted [Excessive Worry] : no excessive worries [Ruminations] : no rumination disorder [Obsessions] : no obsessions [Re-experiencing] : no re-experiencing [Restlessness] : no restlessness [Hypochondriasis] : no hypochondriasis [Panic] : no panic disorder

## 2022-10-12 NOTE — ASSESSMENT
[FreeTextEntry1] : Rosa Angeles is a 58 yo AAF, single, domiciled at [facility], on [place], with pmhx significant for seizure d/o, and pphx of reported schizophrenia, 2 prior IPP admissions, no suicidality, who presents to [facility] for medication management. Upon assessment, pt presents with euthymic mood, full and congruent affect, linear thought process, and without notable thought content (denies SI/HI) or perceptual disturbances. Pt currently does not meet criteria for acute decompensation in depression, israel or psychosis, however given collateral information from ACT team stating pt has been on current regimen since 2018, will continue current regimen.\par \par \par Pt presents with euthymic mood, linear thought process, congruent and full affect, without notable thought content (denies SI/HI) or perceptual disturbances, and without signs or symptoms concerning for acute depression, israel or psychosis. Given pt's sedation has improved on lowered dose, will continue zyprexa 10mg BID; risks and benefits discussed - pt agreeable. \par \par Schizoaffective d/o\par -continue depakote 500mg BID per pt's neurologist for seizure d/o\par -continue zyprexa to 10mg BID \par -continue to f/u with pt's , [facility] [phone], to assess for med compliance/stability \par -f/u in 8 weeks
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
## 2022-10-19 ENCOUNTER — NON-APPOINTMENT (OUTPATIENT)
Age: 57
End: 2022-10-19

## 2022-10-19 ENCOUNTER — OUTPATIENT (OUTPATIENT)
Dept: OUTPATIENT SERVICES | Facility: HOSPITAL | Age: 57
LOS: 1 days | Discharge: HOME | End: 2022-10-19

## 2022-10-19 ENCOUNTER — APPOINTMENT (OUTPATIENT)
Dept: INTERNAL MEDICINE | Facility: CLINIC | Age: 57
End: 2022-10-19

## 2022-10-19 VITALS
BODY MASS INDEX: 42.59 KG/M2 | OXYGEN SATURATION: 98 % | TEMPERATURE: 96.6 F | HEIGHT: 66 IN | DIASTOLIC BLOOD PRESSURE: 83 MMHG | WEIGHT: 265 LBS | SYSTOLIC BLOOD PRESSURE: 120 MMHG | HEART RATE: 94 BPM

## 2022-10-19 DIAGNOSIS — C73 MALIGNANT NEOPLASM OF THYROID GLAND: ICD-10-CM

## 2022-10-19 DIAGNOSIS — E78.5 HYPERLIPIDEMIA, UNSPECIFIED: ICD-10-CM

## 2022-10-19 DIAGNOSIS — Z00.00 ENCOUNTER FOR GENERAL ADULT MEDICAL EXAMINATION W/OUT ABNORMAL FINDINGS: ICD-10-CM

## 2022-10-19 DIAGNOSIS — R73.03 PREDIABETES.: ICD-10-CM

## 2022-10-19 DIAGNOSIS — E66.01 MORBID (SEVERE) OBESITY DUE TO EXCESS CALORIES: ICD-10-CM

## 2022-10-19 DIAGNOSIS — F25.0 SCHIZOAFFECTIVE DISORDER, BIPOLAR TYPE: ICD-10-CM

## 2022-10-19 DIAGNOSIS — Z98.890 OTHER SPECIFIED POSTPROCEDURAL STATES: Chronic | ICD-10-CM

## 2022-10-19 DIAGNOSIS — E89.0 POSTPROCEDURAL HYPOTHYROIDISM: ICD-10-CM

## 2022-10-19 DIAGNOSIS — G40.909 EPILEPSY, UNSPECIFIED, NOT INTRACTABLE, W/OUT STATUS EPILEPTICUS: ICD-10-CM

## 2022-10-19 DIAGNOSIS — S62.109A FRACTURE OF UNSPECIFIED CARPAL BONE, UNSPECIFIED WRIST, INITIAL ENCOUNTER FOR CLOSED FRACTURE: Chronic | ICD-10-CM

## 2022-10-19 PROCEDURE — 99214 OFFICE O/P EST MOD 30 MIN: CPT | Mod: GC

## 2022-10-19 RX ORDER — IBUPROFEN 800 MG/1
800 TABLET, FILM COATED ORAL
Qty: 42 | Refills: 0 | Status: DISCONTINUED | COMMUNITY
Start: 2022-06-18 | End: 2022-10-19

## 2022-10-19 NOTE — HISTORY OF PRESENT ILLNESS
[FreeTextEntry1] : routine f/u seizure d/o, predm2, morbid obesity, HLD [de-identified] : no active complaints. relatively sedentary. cooking with butter, frying. last breakthrough seizure in august time following c neuro, adherent reportedly c AEDs. feels well

## 2022-10-19 NOTE — PHYSICAL EXAM
[Normal] : no acute distress, well nourished, well developed and well-appearing [Normal Sclera/Conjunctiva] : normal sclera/conjunctiva [No JVD] : no jugular venous distention [No Accessory Muscle Use] : no accessory muscle use [Clear to Auscultation] : lungs were clear to auscultation bilaterally [Regular Rhythm] : with a regular rhythm [Normal S1, S2] : normal S1 and S2 [Normal Insight/Judgement] : insight and judgment were intact [de-identified] : morbidly obese [de-identified] : denies depression or israel currently- follows c behavioral

## 2022-10-19 NOTE — ASSESSMENT
[FreeTextEntry1] : Obesity;\par Diet/Exercise Counseled\par Patient was counseled on changing their diet to low fat, low carbohydrates and low cholesterol.\par Patient was also counseled on increasing their activity to 45 minutes of exercise daily.\par \par seizure d/o, c/w AEDs\par following c neuro\par \par pre Diabetes;\par Low sugar, low carbohydrate diet \par Exercise Counseled \par Patient given opportunity to discuss frequency and target blood sugar levels\par Patient educated on symptoms of hypo/hyperglycemic events \par Counseled on: Yearly Ophthalmology and Podiatry Exam\par diet controlled, a1c stable, repeat\par \par thryoid ca hx s/p resection\par reportedly stable\par no b symptoms\par repeat tfts prior to next visit\par \par schizoaffective d/o\par -continue zyprexa to 10mg BID , following c psych\par Depression/Anxiety- Suicide Screening;\par \par Patient denies any suicidal and homicidal ideations at this time.\par \par Patient will follow up with specialist if worsening symptoms.\par \par Information provided on psychiatric ER\par no israel currently\par \par HCM\par needs mammo, colonoscopy, pap\par had flu shot at local pharmacy\par labs prior to next visit\par RTC in 6 mo

## 2022-10-21 DIAGNOSIS — Z00.00 ENCOUNTER FOR GENERAL ADULT MEDICAL EXAMINATION WITHOUT ABNORMAL FINDINGS: ICD-10-CM

## 2022-10-21 DIAGNOSIS — C73 MALIGNANT NEOPLASM OF THYROID GLAND: ICD-10-CM

## 2022-10-21 DIAGNOSIS — E78.5 HYPERLIPIDEMIA, UNSPECIFIED: ICD-10-CM

## 2022-10-21 DIAGNOSIS — G40.909 EPILEPSY, UNSPECIFIED, NOT INTRACTABLE, WITHOUT STATUS EPILEPTICUS: ICD-10-CM

## 2022-10-21 DIAGNOSIS — R73.03 PREDIABETES: ICD-10-CM

## 2022-10-21 DIAGNOSIS — E89.0 POSTPROCEDURAL HYPOTHYROIDISM: ICD-10-CM

## 2022-10-21 DIAGNOSIS — F25.0 SCHIZOAFFECTIVE DISORDER, BIPOLAR TYPE: ICD-10-CM

## 2022-10-21 DIAGNOSIS — E66.01 MORBID (SEVERE) OBESITY DUE TO EXCESS CALORIES: ICD-10-CM

## 2022-10-25 ENCOUNTER — NON-APPOINTMENT (OUTPATIENT)
Age: 57
End: 2022-10-25

## 2022-10-25 NOTE — REASON FOR VISIT
[Death of patient] : Death of patient [FreeTextEntry1] : Received call from [place]  [phone] from [facility] that patient  from complications of seizure while visiting friend on 10/22.  Was last seen by me 10/12. She saw her PMD on 10/19 for routine well visit per EMR and had no complaints.  She has a history of seizure disorder.

## 2022-11-08 ENCOUNTER — APPOINTMENT (OUTPATIENT)
Dept: GASTROENTEROLOGY | Facility: CLINIC | Age: 57
End: 2022-11-08

## 2022-12-07 ENCOUNTER — APPOINTMENT (OUTPATIENT)
Dept: PSYCHIATRY | Facility: CLINIC | Age: 57
End: 2022-12-07

## 2023-02-07 ENCOUNTER — APPOINTMENT (OUTPATIENT)
Dept: NEUROLOGY | Facility: CLINIC | Age: 58
End: 2023-02-07

## 2023-02-07 ENCOUNTER — APPOINTMENT (OUTPATIENT)
Age: 58
End: 2023-02-07

## 2023-04-12 ENCOUNTER — APPOINTMENT (OUTPATIENT)
Dept: INTERNAL MEDICINE | Facility: CLINIC | Age: 58
End: 2023-04-12

## 2023-06-13 ENCOUNTER — APPOINTMENT (OUTPATIENT)
Dept: OBGYN | Facility: CLINIC | Age: 58
End: 2023-06-13

## 2023-06-13 ENCOUNTER — NON-APPOINTMENT (OUTPATIENT)
Age: 58
End: 2023-06-13

## 2023-07-11 NOTE — ED PROVIDER NOTE - PRO INTERPRETER NEED 2
Per mom, patient has had ear pain for a couple of days. Ear pain is the only symptom.  No fever  Patient has been swimming lately  Mom wanting patient to be seen tomorrow to determine possible swimmers ear vs ear infection.    Appointment scheduled, aware at [facility]   [facility]

## 2023-10-15 NOTE — ED ADULT NURSE NOTE - NS ED PATIENT SAFETY CONCERN
Pt c/o right knee pain after her dog ran full force into her knee.  She has pain to the medial side of knee.    
No

## 2024-03-14 NOTE — ED ADULT TRIAGE NOTE - CHIEF COMPLAINT QUOTE
HPI:  69yoF w/PMHx of HTN, HLD, Type 2 DM, mild claudication, CAD s/p PTCA w/stents (last PTCA a few months prior), previous smoking hx (quit 9y prior), Initially referred to outpatient office by  Dr. Pablo Dominguez for evaluation of her high-grade L ICA stenosis. She had OSH/OSC w/u  with a screening duplex and CTA neck which revealed b/l carotid stenosis. Repeat Duplex at office revealed 50-69% stenosis of proximal R ICA and >70% stenosis of proximal L ICA . Pt clinical course has been asymptomatic w/ no hx of CVA/TIA . She denies any neurologic deficits or events in the past. She reports hx of "shooting pains" in her neck and subsequent b/l headaches for which she uses topical diclofenac and Nurtec and was previously trialed on tramadol(no longer takes). She was on DAPT with  ASA, Plavix, and high intensity statin for her coronary stents prior to her office visit which she states she took prior to arriving to the hospital today. She is now s/p left-sided transcarotid artery revascularization with intra-operative flow reversal, balloon angioplasty and stenting. Admitted to inpatient tele for post-op neuro and hemodynamic monitoring    SUBJECTIVE:  Patient was seen and examined at bedside. Reports left ear and neck pain, denies headache, no change in vision, no weakness, no numbness. Denies SOB, no chest pain. No other complaints or events reported. Telemetry reviewed     Review of systems: No fever, chills, dizziness, HA, Changes in vision, CP, dyspnea, nausea or vomiting, dysuria, changes in bowel movements, LE edema. Rest of 12 point Review of systems negative unless otherwise documented elsewhere in note.     Diet, DASH/TLC:   Sodium & Cholesterol Restricted (03-13-24 @ 21:01) [Active]      MEDICATIONS:  MEDICATIONS  (STANDING):  amLODIPine   Tablet 5 milliGRAM(s) Oral daily  aspirin enteric coated 81 milliGRAM(s) Oral daily  atorvastatin 80 milliGRAM(s) Oral at bedtime  buPROPion XL (24-Hour) . 150 milliGRAM(s) Oral daily  clopidogrel Tablet 75 milliGRAM(s) Oral daily  dextrose 5%. 1000 milliLiter(s) (50 mL/Hr) IV Continuous <Continuous>  dextrose 50% Injectable 25 Gram(s) IV Push once  glucagon  Injectable 1 milliGRAM(s) IntraMuscular once  insulin lispro (ADMELOG) corrective regimen sliding scale   SubCutaneous Before meals and at bedtime  pantoprazole    Tablet 40 milliGRAM(s) Oral before breakfast    MEDICATIONS  (PRN):  albuterol    90 MICROgram(s) HFA Inhaler 1 Puff(s) Inhalation four times a day PRN Shortness of Breath and/or Wheezing  benzocaine/menthol Lozenge 1 Lozenge Oral every 4 hours PRN Sore Throat  dextrose Oral Gel 15 Gram(s) Oral once PRN Blood Glucose LESS THAN 70 milliGRAM(s)/deciliter      Allergies    morphine (Other)  metoprolol (Unknown)  codeine (Anaphylaxis)  Biaxin (Vomiting)  penicillin (Hives)  latex (Other; Pruritus)    Intolerances        OBJECTIVE:  Vital Signs Last 24 Hrs  T(C): 37.1 (14 Mar 2024 09:02), Max: 37.1 (14 Mar 2024 09:02)  T(F): 98.8 (14 Mar 2024 09:02), Max: 98.8 (14 Mar 2024 09:02)  HR: 71 (14 Mar 2024 09:02) (65 - 96)  BP: 151/74 (14 Mar 2024 09:02) (116/79 - 174/84)  BP(mean): 86 (14 Mar 2024 05:15) (80 - 133)  RR: 17 (14 Mar 2024 09:02) (12 - 40)  SpO2: 98% (14 Mar 2024 09:02) (96% - 100%)    Parameters below as of 14 Mar 2024 09:02  Patient On (Oxygen Delivery Method): room air      I&O's Summary    13 Mar 2024 07:01  -  14 Mar 2024 07:00  --------------------------------------------------------  IN: 1380 mL / OUT: 825 mL / NET: 555 mL        PHYSICAL EXAM:  General: AOX3, NAD, lying in bed, speaking in full sentences, no labored breathing on RA  HEENT: left neck hematoma, no facial asymmetry  Lungs: no crackles, no wheezes  Heart: RRR  Abdomen: soft, non-tender  Extremities: warm, no edema, no tenderness, no focal deficit       LABS:                        9.3    11.08 )-----------( 228      ( 14 Mar 2024 05:30 )             29.4     03-14    138  |  106  |  18  ----------------------------<  117<H>  3.8   |  22  |  0.76    Ca    8.4      14 Mar 2024 05:30  Phos  2.9     03-14  Mg     2.2     03-14        PT/INR - ( 13 Mar 2024 13:49 )   PT: 11.1 sec;   INR: 0.97          PTT - ( 13 Mar 2024 13:49 )  PTT:30.6 sec  CAPILLARY BLOOD GLUCOSE      POCT Blood Glucose.: 113 mg/dL (14 Mar 2024 07:39)  POCT Blood Glucose.: 167 mg/dL (13 Mar 2024 22:31)  POCT Blood Glucose.: 160 mg/dL (13 Mar 2024 14:59)    Urinalysis Basic - ( 14 Mar 2024 05:30 )    Color: x / Appearance: x / SG: x / pH: x  Gluc: 117 mg/dL / Ketone: x  / Bili: x / Urobili: x   Blood: x / Protein: x / Nitrite: x   Leuk Esterase: x / RBC: x / WBC x   Sq Epi: x / Non Sq Epi: x / Bacteria: x        MICRODATA:      RADIOLOGY/OTHER STUDIES:    
Patient c.o midsternal chest pain x 4 days. Denies sob

## 2024-04-04 NOTE — H&P PST ADULT - NSICDXFAMILYHX_GEN_ALL_CORE_FT
no fever and no chills. FAMILY HISTORY:  Mother  Still living? Unknown  Family history of diabetes mellitus (DM), Age at diagnosis: Age Unknown

## 2024-08-16 NOTE — ED PROVIDER NOTE - NS ED MD DISPO DISCHARGE
Level of Care: Telemetry [5]   Diagnosis: Acute appendicitis [947224]   Admitting Physician: HALLIE RODRIGUEZ III [[id number]]   Attending Physician: HALLIE RODRIGUEZ III [[id number]]   Bed Request Comments: tele obs not cdu   Home

## 2024-08-21 NOTE — ED ADULT TRIAGE NOTE - PAIN RATING/NUMBER SCALE (0-10): REST
[Time Spent: ___ minutes] : I have spent [unfilled] minutes of time on the encounter which excludes teaching and separately reported services. 8

## 2024-10-08 NOTE — ASU PATIENT PROFILE, ADULT - FALL HARM RISK - FALL HARM RISK
Physical Therapy Evaluation    Visit Type: Initial Evaluation- Daily Treatment Note  Visit: 1  Referring Provider: Renzo Galicia MD  Medical Diagnosis (from order): M75.121 - Complete tear of right rotator cuff  S49.91XA - Injury of right upper arm   Treatment Diagnosis: right shoulder - increased pain/symptoms, impaired range of motion, impaired strength, impaired activity tolerance, impaired posture, impaired scapulohumeral rhythm and impaired mobility.  Onset  - Date of onset: 2024  Patient alert and oriented X3.  Chart reviewed at time of initial evaluation (relevant co-morbidities, allergies, tests and medications listed):   - Diagnostic tests reviewed: MRI studies  Past Medical History:  No date: Diabetes mellitus  (CMD)      Comment:  CONTROLLED WITH DIET AND EXERCISE  No date: Essential (primary) hypertension  No date: High cholesterol  No date: Thyroid condition  Past Surgical History:  No date:  section, classic      Comment:  X2  10/2020: Colonoscopy  : Hand surgery; Bilateral  ALLERGIES:  No Known Allergies  Current Outpatient Medications:  predniSONE (DELTASONE) 20 MG tablet, Take 2 tablets by mouth daily.  lisinopril-hydroCHLOROthiazide (ZESTORETIC) 20-12.5 MG per tablet, Take 1 tablet by mouth daily.  levothyroxine 75 MCG tablet, Take 75 mcg by mouth daily.  aspirin 81 MG EC tablet, Take 81 mg by mouth every morning.   Omega-3 Fatty Acids (OMEGA-3 FISH OIL PO), Take 1 capsule by mouth every morning.   Calcium Carbonate (CALCIUM 600 PO), Take 600 mg by mouth daily.   Multiple Vitamin (MULTI-VITAMIN DAILY PO), Take 1 tablet by mouth daily.   pravastatin (PRAVACHOL) 40 MG tablet, Take 40 mg by mouth every evening.     No current facility-administered medications for this visit.        SUBJECTIVE                                                                                                               Initial eval: Patient reports to physical therapy with complaints of right  shoulder pain that started in January of 2024 with working with dough when cooking and then pain started to be worse the next day. Pt states that she got an MRI but the doctor has not given her the results yet.  She was just told to come to therapy.    Pt states that with reaching her arm overhead will increase her pain.  Lifting something heavy also increases her pain especially picking it up to the counter.  Pt states that it will wake her up if she rolls only onto the right arm.  Pt denies NT.  Pt states that she can reach behind her back to hook her bra but reaching across her body will increase pain.  Reaching to the back seat increases pain.         MRI right shoulder from 10/2/24  IMPRESSION:  1. Study is limited by motion.  2. High-grade partial tearing/near complete rupture involving supraspinatus  tendon with a few intact residual fibers seen anteriorly.  3. Infraspinatus and subscapularis tendinopathy.  4. Split tear involving biceps tendon at level of the intertubercular  groove.      Pain / Symptoms  - Pain rating (out of 10): Current: 0 ; Best: 0; Worst: 5  - Location: Right shoulder  - Quality / Description: ache, sharp  - Alleviating Factors: over-the-counter medication, avoiding movement in involved area    Function:   Limitations / Exacerbation Factors:   - Patient reports pain, difficulty and increased time with function reported below.  - sleep disturbed, house/yard work, grocery shopping, lifting/carrying, pushing/pulling and reaching  Prior Level of Function: pain free ADLs and IADLs,    Patient Goals: decreased pain and independence with ADLs/IADLs.    Prior treatment  - no therapies  - Discharged from hospital, home health, or skilled nursing facility in last 30 days: no  Home Environment   - Patient lives with: significant other  - Type of home: single level home  - Assistance available: as needed  - Denies 2 or more falls or an unexplained fall with injury in the last year.  - Feel safe at  home / work / school: yes      OBJECTIVE                                                                                                                    Posture:  - Shoulders: rounded  RUE:     - Position: shoulder protracted  Cervical: forward head      Range of Motion (ROM)   (degrees unless noted; active unless noted; norms in ( ); negative=lacking to 0, positive=beyond 0)  Shoulder:    - Flexion (180):         Left:160           Right: 125 pain    - Abduction (180):         Left: 170         Right: 155 pain    - Internal Rotation:        - Behind Back:            Left: T5            Right: T7    - External Rotation:         Right: pain       - at 0°:             Left: 75            Right: 65       - Behind the Head:            Left: T3             Right: T2     Strength  (out of 5 unless noted, standard test position unless noted)   Shoulder:     - Flexion:          Left: 4+          Right: 3+     - Abduction:         Left: 4+         Right: 4-    - Internal Rotation:           Left (at 0°): 5         Right (at 0°): 4+    - External Rotation:          Left (at 0°): 5         Right (at 0°): 3-, pain   Elbow/Forearm:    - Flexion:         Left: 5         Right: 4+     - Extension:         Left: 4+         Right: 4+                      Outcome/Assessments  Outcome Measures:   Quick Disabilities of the Arm, Shoulder and Hand: QuickDash Total Score (Score will not calculate if more then 2 questions are left blank): 45.45   (scored 0-100; a higher score indicates greater disability) see flowsheet for additional documentation     Treatment     Therapeutic Exercise  Education on evaluation findings, treatment plan and goals  Educated on anatomy and biomechanics of pain and lack of function    Instructed in home exercise program and gave written copy     Seated and standing shoulder rolls then working into scapular retraction  Yellow band resisted shoulder row with scapular retraction x 10   Yellow band resisted B  shoulder external rotation - limited pain free range x 10 - added towel roll under right for comfort  Wall slides bilateral, feet staggered flexion x 10       Skilled input: verbal instruction/cues, tactile instruction/cues, facilitation and inhibition    Writer verbally educated and received verbal consent for hand placement, positioning of patient, and techniques to be performed today from patient for clothing adjustments for techniques, therapist position for techniques and hand placement and palpation for techniques as described above and how they are pertinent to the patient's plan of care.  Home Exercise Program  Access Code: [email or web address]  URL: [email or web address]  Date: 10/08/2024  Prepared by: Katie Sadler    Exercises  - Seated Scapular Retraction  - 2 x daily - 7 x weekly - 3 sets - 10 reps  - Standing Shoulder Row with Anchored Resistance  - 2 x daily - 7 x weekly - 2 sets - 10 reps  - Standing shoulder flexion wall slides  - 2 x daily - 7 x weekly - 2 sets - 10 reps  - Standing Shoulder External Rotation with Resistance  - 2 x daily - 7 x weekly - 2 sets - 10 reps      ASSESSMENT                                                                                                          68 year old patient has reported functional limitations listed above impacted by signs and symptoms consistent with treatment diagnosis below.  Treatment Diagnosis:   - Involved: right shoulder.  - Symptoms/impairments: increased pain/symptoms, impaired range of motion, impaired strength, impaired activity tolerance, impaired posture, impaired scapulohumeral rhythm and impaired mobility.    Initial eval: Ines presents to PT with right shoulder pain, MRI showing RTC tear.  Pt lacks full ROM, posture, strength, good scapulohumeral rhythm, scapular stabilization and neuromuscular control.  These things significantly effect function as seen with trouble reaching, lifting, carrying, and sleeping.     Patient  is motivated to decrease pain and improve functional mobility to return to independence with ADLs, housework, and work duties. Patient will benefit from skilled physical therapy to address the above mentioned limitations to ensure safe and scaled progression of interventions and exercises to decrease impairments (listed above) and improve function. This will allow the patient to safely and efficiently complete daily tasks associated with accessing their community and home environment.    Prognosis: Patient will benefit from skilled therapy.  Rehabilitative potential is: good.  Predicted patient presentation: Low (stable) - Patient comorbidities and complexities, as defined above, will have little effect on progress for prescribed plan of care.  Education:   - Present and ready to learn: patient  - Results of above outlined education: Verbalizes understanding    PLAN                                                                                                                         The following skilled interventions to be implemented to achieve goals listed below:  Neuromuscular Re-Education (12389)  Therapeutic Activity (31812)  Therapeutic Exercise (34620)  Manual Therapy (30224)    Frequency / Duration  1 times per week tapering as patient progresses for 10 weeks for an estimated total of 8 visits    Patient involved in and agreed to plan of care and goals.  Patient given attendance policy at time of initial evaluation.    Suggestions for next session as indicated: Monitor form and tolerance to home exercise program and Progress per plan of care,     Goals  Long Term Goals: to be met by end of plan of care  1. Patient will reach overhead with proper scapulohumeral rhythm, with patient reported manageable/tolerable difficulty for moving dishes/objects in and out of cabinets.   2. Patient will lift from floor to waist height lifting 10 lbs , with patient reported manageable/tolerable difficulty for grocery  shopping.   3. Quick DASH: Patient will score 30 or lower on The Quick DASH to indicate a decreased level of impairment with lifting, carrying, household and self-care activity. (minimal clinically important difference: 14 of calculated score)  4. Patient will be independent with progressed and modified home exercise program.      Therapy procedure time and total treatment time can be found documented on the Time Entry flowsheet     No indicators present

## 2025-01-17 NOTE — ASU PATIENT PROFILE, ADULT - BRADEN SCORE (IF 18 OR LESS ACTIVATE SKIN INJURY RISK INCREASED GUIDELINE), MLM
----- Message from Pat LOZADA RN sent at 1/17/2025 10:41 AM CST -----    ----- Message -----  From: Ford Lyn MD  Sent: 1/17/2025  10:40 AM CST  To: [facility] Nurse Msg Pool    Hyperplastic polyp.  10 year colonoscopy.  
Writer sent non urgent test results to patient via [facility] portal. Recall entered.  
23